# Patient Record
Sex: MALE | Race: WHITE | NOT HISPANIC OR LATINO | Employment: FULL TIME | URBAN - METROPOLITAN AREA
[De-identification: names, ages, dates, MRNs, and addresses within clinical notes are randomized per-mention and may not be internally consistent; named-entity substitution may affect disease eponyms.]

---

## 2017-06-22 ENCOUNTER — OFFICE VISIT (OUTPATIENT)
Dept: FAMILY MEDICINE | Facility: CLINIC | Age: 27
End: 2017-06-22
Payer: COMMERCIAL

## 2017-06-22 VITALS
DIASTOLIC BLOOD PRESSURE: 87 MMHG | WEIGHT: 282 LBS | RESPIRATION RATE: 16 BRPM | BODY MASS INDEX: 35.06 KG/M2 | HEART RATE: 88 BPM | SYSTOLIC BLOOD PRESSURE: 136 MMHG | TEMPERATURE: 98.9 F | HEIGHT: 75 IN

## 2017-06-22 DIAGNOSIS — H10.32 ACUTE CONJUNCTIVITIS OF LEFT EYE, UNSPECIFIED ACUTE CONJUNCTIVITIS TYPE: Primary | ICD-10-CM

## 2017-06-22 PROCEDURE — 99213 OFFICE O/P EST LOW 20 MIN: CPT | Performed by: PHYSICIAN ASSISTANT

## 2017-06-22 RX ORDER — CIPROFLOXACIN HYDROCHLORIDE 3.5 MG/ML
1 SOLUTION/ DROPS TOPICAL
Qty: 1 BOTTLE | Refills: 0 | Status: SHIPPED | OUTPATIENT
Start: 2017-06-22 | End: 2017-06-29

## 2017-06-22 NOTE — MR AVS SNAPSHOT
After Visit Summary   6/22/2017    Yakov Cantrell    MRN: [id number]           Patient Information     Date Of Birth          1990        Visit Information        Provider Department      6/22/2017 11:00 AM Jr Person PA-C [facility]        Today's Diagnoses     Acute conjunctivitis of left eye, unspecified acute conjunctivitis type    -  1      Care Instructions      Conjunctivitis, Nonspecific    The membrane that covers the white part of your eye (the conjunctiva) is inflamed. Inflammation happens when your body responds to an injury, allergic reaction, infection, or illness. Symptoms of inflammation in the eye may include redness, irritation, itching, swelling, or burning. These symptoms should go away within the next 24 hours. Conjunctivitis may be related to a particle that was in your eye. If so, it may wash out with your tears or irrigation treatment. Being exposed to liquid chemicals or fumes may also cause this reaction.   Home care    Apply a cold pack (ice in a plastic bag, wrapped in a towel) over the eye for 20 minutes at a time. This will reduce pain.    Artificial tears may be prescribed to reduce irritation or redness.  These should be used 3 to 4 times a day.    You may use acetaminophen or ibuprofen to control pain, unless another medicine was prescribed.(Note: If you have chronic liver or kidney disease, or if you have ever had a stomach ulcer or gastrointestinal bleeding, talk with your healthcare provider before using these medicines.)  Follow-up care  Follow up with your healthcare provider, or as advised.  When to seek medical advice  Call your healthcare provider right away if any of these occur:    Increased eyelid swelling    Increased eye pain    Increased redness or drainage from the eye    Increased blurry vision or increased sensitivity to light    Failure of normal vision to return within 24 to 48 hours  Date Last Reviewed: 6/14/2015     "[id number] [employer]. [address] [place] [address], [place] [address]. All rights reserved. This information is not intended as a substitute for professional medical care. Always follow your healthcare professional's instructions.                Follow-ups after your visit        Who to contact     If you have questions or need follow up information about today's clinic visit or your schedule please contact [facility] directly at [phone].  Normal or non-critical lab and imaging results will be communicated to you by Buzzwirehart, letter or phone within 4 business days after the clinic has received the results. If you do not hear from us within 7 days, please contact the clinic through Appniquet or phone. If you have a critical or abnormal lab result, we will notify you by phone as soon as possible.  Submit refill requests through Justworks or call your pharmacy and they will forward the refill request to us. Please allow 3 business days for your refill to be completed.          Additional Information About Your Visit        BuzzwireharCÃœR Information     Justworks lets you send messages to your doctor, view your test results, renew your prescriptions, schedule appointments and more. To sign up, go to [email or web address] . Click on \"Log in\" on the left side of the screen, which will take you to the Welcome page. Then click on \"Sign up Now\" on the right side of the page.     You will be asked to enter the access code listed below, as well as some personal information. Please follow the directions to create your username and password.     Your access code is: [email or web address]  Expires: 2017 11:10 AM     Your access code will  in 90 days. If you need help or a new code, please call your [facility] or [phone].        Care EveryWhere ID     This is your Care EveryWhere ID. This could be used by other organizations to access your [facility] medical records  [id number]        Your Vitals " "Were     Pulse Temperature Respirations Height BMI (Body Mass Index)       88 98.9  F (37.2  C) (Oral) 16 6' 3\" (1.905 m) 35.25 kg/m2        Blood Pressure from Last 3 Encounters:   06/22/17 145/87   12/14/16 120/72   07/29/16 (!) 145/91    Weight from Last 3 Encounters:   06/22/17 282 lb (127.9 kg)   12/14/16 264 lb 4.8 oz (119.9 kg)   07/29/16 280 lb (127 kg)              Today, you had the following     No orders found for display         Today's Medication Changes          These changes are accurate as of: 6/22/17 11:10 AM.  If you have any questions, ask your nurse or doctor.               Start taking these medicines.        Dose/Directions    ciprofloxacin 0.3 % ophthalmic solution   Commonly known as:  CILOXAN   Used for:  Acute conjunctivitis of left eye, unspecified acute conjunctivitis type   Started by:  Jr Person PA-C        Dose:  1 drop   Apply 1 drop to eye every 4 hours (while awake) for 7 days   Quantity:  1 Bottle   Refills:  0            Where to get your medicines      These medications were sent to [facility] [address]  [address] [facility] [address]     Phone:  [phone]     ciprofloxacin 0.3 % ophthalmic solution                Primary Care Provider    None Specified       No primary provider on file.        Equal Access to Services     FRANKI HELMS AH: Lauren guillaume Sonéstor, waaxda luqadaha, qaybta kaalmada hattie eller. So [facility] [phone].    ATENCIÓN: Si habla español, tiene a hayes disposición servicios gratuitos de asistencia lingüística. Marina al [phone].    We comply with applicable federal civil rights laws and [place] laws. We do not discriminate on the basis of race, color, national origin, age, disability sex, sexual orientation or gender identity.            Thank you!     Thank you for choosing [facility]  for your care. Our goal is always " to provide you with excellent care. Hearing back from our patients is one way we can continue to improve our services. Please take a few minutes to complete the written survey that you may receive in the mail after your visit with us. Thank you!             Your Updated Medication List - Protect others around you: Learn how to safely use, store and throw away your medicines at www.disposemymeds.org.          This list is accurate as of: 6/22/17 11:10 AM.  Always use your most recent med list.                   Brand Name Dispense Instructions for use Diagnosis    albuterol 108 (90 BASE) MCG/ACT Inhaler    PROAIR HFA/PROVENTIL HFA/VENTOLIN HFA    1 Inhaler    Inhale 2 puffs into the lungs every 6 hours as needed for shortness of breath / dyspnea or wheezing        ciprofloxacin 0.3 % ophthalmic solution    CILOXAN    1 Bottle    Apply 1 drop to eye every 4 hours (while awake) for 7 days    Acute conjunctivitis of left eye, unspecified acute conjunctivitis type

## 2017-06-22 NOTE — NURSING NOTE
"  Chief Complaint   Patient presents with     Eye Problem       Initial /87 (BP Location: Right arm, Patient Position: Chair, Cuff Size: Adult Large)  Pulse 88  Temp 98.9  F (37.2  C) (Oral)  Resp 16  Ht 6' 3\" (1.905 m)  Wt 282 lb (127.9 kg)  BMI 35.25 kg/m2 Estimated body mass index is 35.25 kg/(m^2) as calculated from the following:    Height as of this encounter: 6' 3\" (1.905 m).    Weight as of this encounter: 282 lb (127.9 kg).  Medication Reconciliation: complete          NAHID Mejia    "

## 2017-06-22 NOTE — LETTER
[facility]  [address] [place] [id number]  Phone: [phone]    June 22, 2017        Yakov Cantrell  [address] [place] [address] [place] [phone]          To whom it may concern:    RE: Yakov Cantrell    Patient was seen and treated today at our clinic and missed work due to illness.     Please contact me for questions or concerns.      Sincerely,        Jr Person PA-C

## 2017-06-22 NOTE — PROGRESS NOTES
SUBJECTIVE:                                                    Yakov Cantrell is a 26 year old male who presents to clinic today for the following health issues:      Acute Illness   Acute illness concerns: eye  Onset: x 1 day    Fever: no    Chills/Sweats: no    Headache (location?): no    Sinus Pressure:no    Conjunctivitis:  YES- left eye redness, itching, watery    Ear Pain: no    Rhinorrhea: no    Congestion: YES    Sore Throat: no     Cough: no    Wheeze: no    Decreased Appetite: no    Nausea: no    Vomiting: no    Diarrhea:  no    Dysuria/Freq.: no    Fatigue/Achiness: no    Sick/Strep Exposure: no     Therapies Tried and outcome: eye drops-with relief. polytrim from old infection.     Where's contacts. Put contact in eye from past infection. Has since thrown this away.       Problem list and histories reviewed & adjusted, as indicated.  Additional history: as documented    Patient Active Problem List   Diagnosis     CARDIOVASCULAR SCREENING; LDL GOAL LESS THAN 160     History reviewed. No pertinent surgical history.    Social History   Substance Use Topics     Smoking status: Current Some Day Smoker     Smokeless tobacco: Never Used     Alcohol use 0.0 oz/week     0 Standard drinks or equivalent per week     Family History   Problem Relation Age of Onset     Abdominal Aortic Aneurysm Maternal Grandfather          Current Outpatient Prescriptions   Medication Sig Dispense Refill     ciprofloxacin (CILOXAN) 0.3 % ophthalmic solution Apply 1 drop to eye every 4 hours (while awake) for 7 days 1 Bottle 0     albuterol (PROAIR HFA, PROVENTIL HFA, VENTOLIN HFA) 108 (90 BASE) MCG/ACT inhaler Inhale 2 puffs into the lungs every 6 hours as needed for shortness of breath / dyspnea or wheezing 1 Inhaler 1     No Known Allergies  BP Readings from Last 3 Encounters:   06/22/17 136/87   12/14/16 120/72   07/29/16 (!) 145/91    Wt Readings from Last 3 Encounters:   06/22/17 282 lb (127.9 kg)   12/14/16 264 lb 4.8 oz  "(119.9 kg)   07/29/16 280 lb (127 kg)                    Reviewed and updated as needed this visit by clinical staff  Tobacco  Allergies  Meds  Problems  Med Hx  Surg Hx  Fam Hx  Soc Hx        Reviewed and updated as needed this visit by Provider  Allergies  Meds  Problems         ROS:  Constitutional, HEENT, cardiovascular, pulmonary, gi and gu systems are negative, except as otherwise noted.    OBJECTIVE:                                                    /87 (BP Location: Right arm, Patient Position: Chair, Cuff Size: Adult Large)  Pulse 88  Temp 98.9  F (37.2  C) (Oral)  Resp 16  Ht 6' 3\" (1.905 m)  Wt 282 lb (127.9 kg)  BMI 35.25 kg/m2  Body mass index is 35.25 kg/(m^2).  GENERAL: healthy, alert, no distress and obese  EYES: PERRL, EOMI, visual fields normal and conjunctiva/corneas- conjunctival injection OS and yellow colored discharge present left  PSYCH: mentation appears normal, affect normal/bright    Diagnostic Test Results:  none      ASSESSMENT/PLAN:                                                      (H10.32) Acute conjunctivitis of left eye, unspecified acute conjunctivitis type  (primary encounter diagnosis)  Comment: likely bacterial based on history and exam. With contact use and re-infection, will tx with cipro. polytrim to be stopped. Also recommending contact precautions until symptoms resolve. If no improvement in 1 week, patient should RTC. Sooner if worsening.   Plan: ciprofloxacin (CILOXAN) 0.3 % ophthalmic         solution        -Medication use and side effects discussed with the patient. Patient is in complete understanding and agreement with plan.         Follow up: as above     Jr Person PA-C  [facility]"

## 2017-06-22 NOTE — PATIENT INSTRUCTIONS
Conjunctivitis, Nonspecific    The membrane that covers the white part of your eye (the conjunctiva) is inflamed. Inflammation happens when your body responds to an injury, allergic reaction, infection, or illness. Symptoms of inflammation in the eye may include redness, irritation, itching, swelling, or burning. These symptoms should go away within the next 24 hours. Conjunctivitis may be related to a particle that was in your eye. If so, it may wash out with your tears or irrigation treatment. Being exposed to liquid chemicals or fumes may also cause this reaction.   Home care    Apply a cold pack (ice in a plastic bag, wrapped in a towel) over the eye for 20 minutes at a time. This will reduce pain.    Artificial tears may be prescribed to reduce irritation or redness.  These should be used 3 to 4 times a day.    You may use acetaminophen or ibuprofen to control pain, unless another medicine was prescribed.(Note: If you have chronic liver or kidney disease, or if you have ever had a stomach ulcer or gastrointestinal bleeding, talk with your healthcare provider before using these medicines.)  Follow-up care  Follow up with your healthcare provider, or as advised.  When to seek medical advice  Call your healthcare provider right away if any of these occur:    Increased eyelid swelling    Increased eye pain    Increased redness or drainage from the eye    Increased blurry vision or increased sensitivity to light    Failure of normal vision to return within 24 to 48 hours  Date Last Reviewed: 6/14/2015 2000-2017 [employer]. [address] [place] [address]. All rights reserved. This information is not intended as a substitute for professional medical care. Always follow your healthcare professional's instructions.

## 2017-07-17 ENCOUNTER — RADIANT APPOINTMENT (OUTPATIENT)
Dept: GENERAL RADIOLOGY | Facility: CLINIC | Age: 27
End: 2017-07-17
Attending: FAMILY MEDICINE
Payer: COMMERCIAL

## 2017-07-17 ENCOUNTER — OFFICE VISIT (OUTPATIENT)
Dept: FAMILY MEDICINE | Facility: CLINIC | Age: 27
End: 2017-07-17
Payer: COMMERCIAL

## 2017-07-17 VITALS
HEART RATE: 72 BPM | DIASTOLIC BLOOD PRESSURE: 70 MMHG | TEMPERATURE: 98.6 F | SYSTOLIC BLOOD PRESSURE: 110 MMHG | OXYGEN SATURATION: 96 %

## 2017-07-17 DIAGNOSIS — R10.11 ABDOMINAL PAIN, RIGHT UPPER QUADRANT: Primary | ICD-10-CM

## 2017-07-17 DIAGNOSIS — R10.11 ABDOMINAL PAIN, RIGHT UPPER QUADRANT: ICD-10-CM

## 2017-07-17 LAB
ALBUMIN UR-MCNC: 30 MG/DL
APPEARANCE UR: CLEAR
BASOPHILS # BLD AUTO: 0.1 10E9/L (ref 0–0.2)
BASOPHILS NFR BLD AUTO: 0.6 %
BILIRUB UR QL STRIP: NEGATIVE
COLOR UR AUTO: YELLOW
DIFFERENTIAL METHOD BLD: NORMAL
EOSINOPHIL # BLD AUTO: 0.2 10E9/L (ref 0–0.7)
EOSINOPHIL NFR BLD AUTO: 2.7 %
ERYTHROCYTE [DISTWIDTH] IN BLOOD BY AUTOMATED COUNT: 12.8 % (ref 10–15)
GLUCOSE UR STRIP-MCNC: NEGATIVE MG/DL
HCT VFR BLD AUTO: 45.6 % (ref 40–53)
HGB BLD-MCNC: 15.2 G/DL (ref 13.3–17.7)
HGB UR QL STRIP: ABNORMAL
KETONES UR STRIP-MCNC: NEGATIVE MG/DL
LEUKOCYTE ESTERASE UR QL STRIP: NEGATIVE
LYMPHOCYTES # BLD AUTO: 1.8 10E9/L (ref 0.8–5.3)
LYMPHOCYTES NFR BLD AUTO: 21.3 %
MCH RBC QN AUTO: 27.7 PG (ref 26.5–33)
MCHC RBC AUTO-ENTMCNC: 33.3 G/DL (ref 31.5–36.5)
MCV RBC AUTO: 83 FL (ref 78–100)
MONOCYTES # BLD AUTO: 1.3 10E9/L (ref 0–1.3)
MONOCYTES NFR BLD AUTO: 15.5 %
MUCOUS THREADS #/AREA URNS LPF: PRESENT /LPF
NEUTROPHILS # BLD AUTO: 5.1 10E9/L (ref 1.6–8.3)
NEUTROPHILS NFR BLD AUTO: 59.9 %
NITRATE UR QL: NEGATIVE
PH UR STRIP: 6 PH (ref 5–7)
PLATELET # BLD AUTO: 291 10E9/L (ref 150–450)
RBC # BLD AUTO: 5.48 10E12/L (ref 4.4–5.9)
RBC #/AREA URNS AUTO: ABNORMAL /HPF (ref 0–2)
SP GR UR STRIP: >1.03 (ref 1–1.03)
URN SPEC COLLECT METH UR: ABNORMAL
UROBILINOGEN UR STRIP-ACNC: 0.2 EU/DL (ref 0.2–1)
WBC # BLD AUTO: 8.5 10E9/L (ref 4–11)
WBC #/AREA URNS AUTO: ABNORMAL /HPF (ref 0–2)

## 2017-07-17 PROCEDURE — 80076 HEPATIC FUNCTION PANEL: CPT | Performed by: FAMILY MEDICINE

## 2017-07-17 PROCEDURE — 36415 COLL VENOUS BLD VENIPUNCTURE: CPT | Performed by: FAMILY MEDICINE

## 2017-07-17 PROCEDURE — 85025 COMPLETE CBC W/AUTO DIFF WBC: CPT | Performed by: FAMILY MEDICINE

## 2017-07-17 PROCEDURE — 74020 XR ABDOMEN 2 VW: CPT

## 2017-07-17 PROCEDURE — 99214 OFFICE O/P EST MOD 30 MIN: CPT | Performed by: FAMILY MEDICINE

## 2017-07-17 PROCEDURE — 81001 URINALYSIS AUTO W/SCOPE: CPT | Performed by: FAMILY MEDICINE

## 2017-07-17 RX ORDER — KETOROLAC TROMETHAMINE 10 MG/1
10 TABLET, FILM COATED ORAL EVERY 6 HOURS PRN
Qty: 20 TABLET | Refills: 0 | Status: SHIPPED | OUTPATIENT
Start: 2017-07-17 | End: 2018-03-20

## 2017-07-17 NOTE — PROGRESS NOTES
SUBJECTIVE:                                                    Yakov Cantrell is a 27 year old male who presents to clinic today for the following health issues:      Abdominal pain- R upper quadrant, pain radiates through navel,  Pain on bilateral lower sides of scapula. Pain started Saturday and it has been constant. Has not been taking any meds but has doing resting.    Red, dry and scaly spot on fingers palm side. Denied itchiness.         Problem list and histories reviewed & adjusted, as indicated.  Additional history:     Patient Active Problem List   Diagnosis     CARDIOVASCULAR SCREENING; LDL GOAL LESS THAN 160     Herpes zoster without complication     No past surgical history on file.    Social History   Substance Use Topics     Smoking status: Current Some Day Smoker     Smokeless tobacco: Never Used     Alcohol use 0.0 oz/week     0 Standard drinks or equivalent per week      Comment: rarely     Family History   Problem Relation Age of Onset     Abdominal Aortic Aneurysm Maternal Grandfather              Reviewed and updated as needed this visit by clinical staff       Reviewed and updated as needed this visit by Provider         ROS:  Constitutional, HEENT, cardiovascular, pulmonary, gi and gu systems are negative, except as otherwise noted.      OBJECTIVE:   /70 (BP Location: Right arm, Patient Position: Chair, Cuff Size: Adult Regular)  Pulse 72  Temp 98.6  F (37  C) (Oral)  SpO2 96%  There is no height or weight on file to calculate BMI.  GENERAL: healthy, alert and no distress  GENERAL: alert and mild distress  HENT: ear canals and TM's normal, nose and mouth without ulcers or lesions  NECK: no adenopathy, no asymmetry, masses, or scars and thyroid normal to palpation  RESP: lungs clear to auscultation - no rales, rhonchi or wheezes  CV: regular rate and rhythm, normal S1 S2, no S3 or S4, no murmur, click or rub, no peripheral edema and peripheral pulses strong  ABDOMEN: bowel sounds  normal and soft without tenderness.  MS: no gross musculoskeletal defects noted, no edema    Diagnostic Test Results:  Results for orders placed or performed in visit on 07/17/17   CBC with platelets and differential   Result Value Ref Range    WBC 8.5 4.0 - 11.0 10e9/L    RBC Count 5.48 4.4 - 5.9 10e12/L    Hemoglobin 15.2 13.3 - 17.7 g/dL    Hematocrit 45.6 40.0 - 53.0 %    MCV 83 78 - 100 fl    MCH 27.7 26.5 - 33.0 pg    MCHC 33.3 31.5 - 36.5 g/dL    RDW 12.8 10.0 - 15.0 %    Platelet Count 291 150 - 450 10e9/L    Diff Method Automated Method     % Neutrophils 59.9 %    % Lymphocytes 21.3 %    % Monocytes 15.5 %    % Eosinophils 2.7 %    % Basophils 0.6 %    Absolute Neutrophil 5.1 1.6 - 8.3 10e9/L    Absolute Lymphocytes 1.8 0.8 - 5.3 10e9/L    Absolute Monocytes 1.3 0.0 - 1.3 10e9/L    Absolute Eosinophils 0.2 0.0 - 0.7 10e9/L    Absolute Basophils 0.1 0.0 - 0.2 10e9/L   *UA reflex to Microscopic and Culture ([place] and [facility] (except [place] and [place])   Result Value Ref Range    Color Urine Yellow     Appearance Urine Clear     Glucose Urine Negative NEG mg/dL    Bilirubin Urine Negative NEG    Ketones Urine Negative NEG mg/dL    Specific Gravity Urine >1.030 1.003 - 1.035    Blood Urine Trace (A) NEG    pH Urine 6.0 5.0 - 7.0 pH    Protein Albumin Urine 30 (A) NEG mg/dL    Urobilinogen Urine 0.2 0.2 - 1.0 EU/dL    Nitrite Urine Negative NEG    Leukocyte Esterase Urine Negative NEG    Source Midstream Urine    Hepatic panel   Result Value Ref Range    Bilirubin Direct <0.1 0.0 - 0.2 mg/dL    Bilirubin Total 0.4 0.2 - 1.3 mg/dL    Albumin 4.2 3.4 - 5.0 g/dL    Protein Total 8.0 6.8 - 8.8 g/dL    Alkaline Phosphatase 48 40 - 150 U/L    ALT 29 0 - 70 U/L    AST 16 0 - 45 U/L   Urine Microscopic   Result Value Ref Range    WBC Urine O - 2 0 - 2 /HPF    RBC Urine O - 2 0 - 2 /HPF    Mucous Urine Present (A) NEG /LPF         ASSESSMENT/PLAN:               ICD-10-CM    1. Abdominal pain, right upper  quadrant R10.11 CBC with platelets and differential     *UA reflex to Microscopic and Culture ([place] and [facility] (except [place] and [place])     XR Abdomen 2 Views     Hepatic panel     Urine Microscopic     CT Abdomen Pelvis w Contrast     ketorolac (TORADOL) 10 MG tablet       27-year-old male with abdominal pain. This abdominal pain he thinks is probably more gallbladder nature. He is afebrile he does not have an elevated white count and he does not have a positive Rivers sign. His exam showed some slight tenderness lower quadrants but certainly not specific to the left lower quadrant. His differential diagnosis certainly would include diverticulitis.    He'll be set up for a CT scan to rule out gallbladder diverticulitis acute appendicitis, torsion.    Luciano Riggs MD  [facility]

## 2017-07-17 NOTE — MR AVS SNAPSHOT
"              After Visit Summary   2017    Yakov Cantrell    MRN: [id number]           Patient Information     Date Of Birth          1990        Visit Information        Provider Department      2017 3:00 PM Luciano Riggs MD [facility]        Today's Diagnoses     Abdominal pain, right upper quadrant    -  1       Follow-ups after your visit        Who to contact     If you have questions or need follow up information about today's clinic visit or your schedule please contact [facility] directly at [phone].  Normal or non-critical lab and imaging results will be communicated to you by Beyond Commercehart, letter or phone within 4 business days after the clinic has received the results. If you do not hear from us within 7 days, please contact the clinic through Beyond Commercehart or phone. If you have a critical or abnormal lab result, we will notify you by phone as soon as possible.  Submit refill requests through "[employer]" or call your pharmacy and they will forward the refill request to us. Please allow 3 business days for your refill to be completed.          Additional Information About Your Visit        MyChart Information     "[employer]" lets you send messages to your doctor, view your test results, renew your prescriptions, schedule appointments and more. To sign up, go to [email or web address]" . Click on \"Log in\" on the left side of the screen, which will take you to the Welcome page. Then click on \"Sign up Now\" on the right side of the page.     You will be asked to enter the access code listed below, as well as some personal information. Please follow the directions to create your username and password.     Your access code is: [email or web address]  Expires: 2017 11:10 AM     Your access code will  in 90 days. If you need help or a new code, please call your [facility] or [phone].        Care EveryWhere ID     This is your Care EveryWhere ID. This could be used by " other organizations to access your [facility] medical records  [id number]        Your Vitals Were     Pulse Temperature Pulse Oximetry             72 98.6  F (37  C) (Oral) 96%          Blood Pressure from Last 3 Encounters:   07/19/17 131/83   07/17/17 110/70   06/22/17 136/87    Weight from Last 3 Encounters:   07/19/17 280 lb (127 kg)   06/22/17 282 lb (127.9 kg)   12/14/16 264 lb 4.8 oz (119.9 kg)              We Performed the Following     *UA reflex to Microscopic and Culture ([place] and [facility] (except [place] and [place])     CBC with platelets and differential     Hepatic panel     Urine Microscopic          Today's Medication Changes          These changes are accurate as of: 7/17/17 11:59 PM.  If you have any questions, ask your nurse or doctor.               Start taking these medicines.        Dose/Directions    ketorolac 10 MG tablet   Commonly known as:  TORADOL   Used for:  Abdominal pain, right upper quadrant   Started by:  Luciano Riggs MD        Dose:  10 mg   Take 1 tablet (10 mg) by mouth every 6 hours as needed for moderate pain   Quantity:  20 tablet   Refills:  0            Where to get your medicines      These medications were sent to [facility] [address] [place] [address] 42  AT [address] [address]     Phone:  [phone]     ketorolac 10 MG tablet                Primary Care Provider    None Specified       No address on file        Equal Access to Services     FRANKI HELMS AH: Hadii navi prabhakaro Sonéstor, waaxda luqadaha, qaybta kaalmada hattie eller. So [facility] [phone].    ATENCIÓN: Si habla español, tiene a hayes disposición servicios gratuitos de asistencia lingüística. Llame al [phone].    We comply with applicable federal civil rights laws and [place] laws. We do not discriminate on the basis of race, color, national origin, age, disability sex,  sexual orientation or gender identity.            Thank you!     Thank you for choosing [facility]  for your care. Our goal is always to provide you with excellent care. Hearing back from our patients is one way we can continue to improve our services. Please take a few minutes to complete the written survey that you may receive in the mail after your visit with us. Thank you!             Your Updated Medication List - Protect others around you: Learn how to safely use, store and throw away your medicines at www.disposemymeds.org.          This list is accurate as of: 7/17/17 11:59 PM.  Always use your most recent med list.                   Brand Name Dispense Instructions for use Diagnosis    albuterol 108 (90 BASE) MCG/ACT Inhaler    PROAIR HFA/PROVENTIL HFA/VENTOLIN HFA    1 Inhaler    Inhale 2 puffs into the lungs every 6 hours as needed for shortness of breath / dyspnea or wheezing        ketorolac 10 MG tablet    TORADOL    20 tablet    Take 1 tablet (10 mg) by mouth every 6 hours as needed for moderate pain    Abdominal pain, right upper quadrant

## 2017-07-17 NOTE — NURSING NOTE
"Chief Complaint   Patient presents with     Abdominal Pain     Derm Problem       Initial /70 (BP Location: Right arm, Patient Position: Chair, Cuff Size: Adult Regular)  Pulse 72  Temp 98.6  F (37  C) (Oral)  SpO2 96% Estimated body mass index is 35.25 kg/(m^2) as calculated from the following:    Height as of 6/22/17: 6' 3\" (1.905 m).    Weight as of 6/22/17: 282 lb (127.9 kg).  Medication Reconciliation: complete     Erica Hernandez CMA (AAMA)        "

## 2017-07-18 ENCOUNTER — TELEPHONE (OUTPATIENT)
Dept: FAMILY MEDICINE | Facility: CLINIC | Age: 27
End: 2017-07-18

## 2017-07-18 ENCOUNTER — HOSPITAL ENCOUNTER (OUTPATIENT)
Dept: CT IMAGING | Facility: CLINIC | Age: 27
Discharge: HOME | End: 2017-07-18
Attending: FAMILY MEDICINE | Admitting: FAMILY MEDICINE
Payer: COMMERCIAL

## 2017-07-18 DIAGNOSIS — R10.11 ABDOMINAL PAIN, RIGHT UPPER QUADRANT: ICD-10-CM

## 2017-07-18 LAB
ALBUMIN SERPL-MCNC: 4.2 G/DL (ref 3.4–5)
ALP SERPL-CCNC: 48 U/L (ref 40–150)
ALT SERPL W P-5'-P-CCNC: 29 U/L (ref 0–70)
AST SERPL W P-5'-P-CCNC: 16 U/L (ref 0–45)
BILIRUB DIRECT SERPL-MCNC: <0.1 MG/DL (ref 0–0.2)
BILIRUB SERPL-MCNC: 0.4 MG/DL (ref 0.2–1.3)
PROT SERPL-MCNC: 8 G/DL (ref 6.8–8.8)

## 2017-07-18 PROCEDURE — 74177 CT ABD & PELVIS W/CONTRAST: CPT

## 2017-07-18 PROCEDURE — 25000128 H RX IP 250 OP 636: Performed by: RADIOLOGY

## 2017-07-18 RX ORDER — IOPAMIDOL 755 MG/ML
500 INJECTION, SOLUTION INTRAVASCULAR ONCE
Status: COMPLETED | OUTPATIENT
Start: 2017-07-18 | End: 2017-07-18

## 2017-07-18 RX ADMIN — IOPAMIDOL 100 ML: 755 INJECTION, SOLUTION INTRAVENOUS at 16:53

## 2017-07-18 RX ADMIN — SODIUM CHLORIDE 65 ML: 9 INJECTION, SOLUTION INTRAVENOUS at 16:53

## 2017-07-18 NOTE — TELEPHONE ENCOUNTER
Pt called back stating he just noticed a rash on abdomen that is red, raised, and bumpy.  Not itchy.  Advised to watch this as unsure if this is related to his abdominal pain.  Can try benadryl/hyrdocortisone cream on it with ice packs.  If it gets worse then he should follow up in clinic.    Pt expressed understanding and acceptance of the plan.  Pt had no further questions at this time.  Advised can call back to clinic at any time with concerns.       Fili Rausch RN, BSN

## 2017-07-18 NOTE — TELEPHONE ENCOUNTER
"Pt calling stating he scheduled an appt for CT today at 4 but is having pain in the left quadrant by belly button that is shooting.  He rates it 5-6/10 when it happens and it is not consistent.  Denies any other new symptoms or fever.  States his family is \"freaking out and wanted me to call.\"  Advised patient that he could wait until CT scan but only he can tell how much pain he is in and what his threshold it.  If he has any vomiting, bowel changes, or fever/chills or worsening pain that is more constant or intense then he should go to the ER with someone to drive.    Pt expressed understanding and acceptance of the plan.  Pt had no further questions at this time.  Advised can call back to clinic at any time with concerns.       Fili Rausch RN, BSN    "

## 2017-07-19 ENCOUNTER — TELEPHONE (OUTPATIENT)
Dept: URGENT CARE | Facility: URGENT CARE | Age: 27
End: 2017-07-19

## 2017-07-19 ENCOUNTER — OFFICE VISIT (OUTPATIENT)
Dept: FAMILY MEDICINE | Facility: CLINIC | Age: 27
End: 2017-07-19
Payer: COMMERCIAL

## 2017-07-19 VITALS
BODY MASS INDEX: 34.82 KG/M2 | DIASTOLIC BLOOD PRESSURE: 83 MMHG | SYSTOLIC BLOOD PRESSURE: 131 MMHG | RESPIRATION RATE: 16 BRPM | OXYGEN SATURATION: 98 % | HEART RATE: 70 BPM | WEIGHT: 280 LBS | TEMPERATURE: 99.4 F | HEIGHT: 75 IN

## 2017-07-19 DIAGNOSIS — B02.9 HERPES ZOSTER WITHOUT COMPLICATION: Primary | ICD-10-CM

## 2017-07-19 PROCEDURE — 99213 OFFICE O/P EST LOW 20 MIN: CPT | Performed by: PHYSICIAN ASSISTANT

## 2017-07-19 RX ORDER — VALACYCLOVIR HYDROCHLORIDE 1 G/1
1000 TABLET, FILM COATED ORAL 3 TIMES DAILY
Qty: 21 TABLET | Refills: 0 | Status: SHIPPED | OUTPATIENT
Start: 2017-07-19 | End: 2017-08-08

## 2017-07-19 NOTE — PATIENT INSTRUCTIONS
Shingles  Shingles is a viral infection caused by the same virus as chicken pox. Anyone who has had chicken pox may get shingles later in life. The virus stays in the body, but remains dormant (asleep). Shingles often occurs in older persons or persons with lowered immunity. But it can affect anyone at any age.  Shingles starts as a tingling patch of skin on one side of the body. Small, painful blisters may then appear. The rash does not spread to the rest of the body.  Exposure to shingles cannot cause shingles. However, it can cause chicken pox in anyone who has not had chicken pox or has not been vaccinated. The contagious period ends when all blisters have crusted over (generally about 2 weeks after the illness begins).  After the blisters heal, the affected skin may be sensitive or painful for months (neuralgia). This often gradually goes away.  A shingles vaccine is available. This can help prevent shingles or make it less painful. It is generally recommended for adults over the age of 60 who have had chicken pox in the past, but who have never had shingles. Adults over 60 who have had neither chicken pox nor shingles can prevent both diseases with the chicken pox vaccine. Ask your healthcare provider about these vaccines.  Home care    Medicines may be prescribed to help relieve pain. Take these medicines as directed. Ask your healthcare provider or pharmacist before using over-the-counter medicines for helping treat pain and itching.    In certain cases, antiviral medicines may be prescribed to reduce pain, shorten the illness, and prevent neuralgia. Take these medicines as directed.    Compresses made from a solution of cool water mixed with cornstarch or baking soda may help relieve pain and itching.     Gently wash skin daily with soap and water to help prevent infection.  Be certain to rinse off all of the soap, which can be irritating.    Trim fingernails and try not to scratch. Scratching the sores  may leave scars.    Stay home from work or school until all blisters have formed a crust and you are no longer contagious.  Follow-up care  Follow up with your healthcare provider or as directed by our staff.  When to seek medical advice    Fever of 100.4 F (38 C) or higher, or as directed by your healthcare provider    Affected skin is on the face or neck and any of the following occur:    Headache    Eye pain    Changes in vision    Sores near the eye    Weakness of facial muscles    Pain, redness, or swelling of a joint    Signs of skin infection: colored drainage from the sores, warmth, increasing redness, or increasing pain  Date Last Reviewed: 9/25/2015 2000-2017 [employer] [address] [place] [address], [place] [address]. All rights reserved. This information is not intended as a substitute for professional medical care. Always follow your healthcare professional's instructions.

## 2017-07-19 NOTE — TELEPHONE ENCOUNTER
"Pt calling with \"blistering rash on right side and wrapping around to back and front\"     Pt states the rash \"really does not hurt except were a zit was\"  But he has been seen for RUQ pain.      Pt denies fever and just noticed rash last night.     Keira Cam RN    "

## 2017-07-19 NOTE — NURSING NOTE
"Chief Complaint   Patient presents with     Derm Problem     rash on right side back and abdomen       Initial /83 (BP Location: Right arm, Patient Position: Chair, Cuff Size: Adult Large)  Pulse 70  Temp 99.4  F (37.4  C) (Oral)  Resp 16  Ht 6' 2.5\" (1.892 m)  Wt 280 lb (127 kg)  SpO2 98%  BMI 35.47 kg/m2 Estimated body mass index is 35.47 kg/(m^2) as calculated from the following:    Height as of this encounter: 6' 2.5\" (1.892 m).    Weight as of this encounter: 280 lb (127 kg).  Medication Reconciliation: complete   Regina George, VIRGIE      "

## 2017-07-19 NOTE — PROGRESS NOTES
SUBJECTIVE:                                                    Yakov Cantrell is a 27 year old male who presents to clinic today for the following health issues:      Rash  Onset: 2 days    Description:   Location: right side back and abdomen  Character: blotchy, raised, red  Itching (Pruritis): no     Progression of Symptoms:  worsening    Accompanying Signs & Symptoms:  Fever: YES  Body aches or joint pain: YES-ruq abdominal pain. Was seen at  two days ago. CT scan was normal. Rash appeared the next day.   Sore throat symptoms: no   Recent cold symptoms: no     History:   Previous similar rash: no     Precipitating factors:   Exposure to similar rash: no   New exposures: None   Recent travel: no     Alleviating factors:  none    Therapies Tried and outcome: none     Of note, patient admits to being under lots of stress lately.     Problem list and histories reviewed & adjusted, as indicated.  Additional history: as documented    Patient Active Problem List   Diagnosis     CARDIOVASCULAR SCREENING; LDL GOAL LESS THAN 160     Herpes zoster without complication     History reviewed. No pertinent surgical history.    Social History   Substance Use Topics     Smoking status: Current Some Day Smoker     Smokeless tobacco: Never Used     Alcohol use 0.0 oz/week     0 Standard drinks or equivalent per week      Comment: rarely     Family History   Problem Relation Age of Onset     Abdominal Aortic Aneurysm Maternal Grandfather          Current Outpatient Prescriptions   Medication Sig Dispense Refill     valACYclovir (VALTREX) 1000 mg tablet Take 1 tablet (1,000 mg) by mouth 3 times daily 21 tablet 0     ketorolac (TORADOL) 10 MG tablet Take 1 tablet (10 mg) by mouth every 6 hours as needed for moderate pain 20 tablet 0     albuterol (PROAIR HFA, PROVENTIL HFA, VENTOLIN HFA) 108 (90 BASE) MCG/ACT inhaler Inhale 2 puffs into the lungs every 6 hours as needed for shortness of breath / dyspnea or wheezing 1 Inhaler 1  "    No Known Allergies  BP Readings from Last 3 Encounters:   07/19/17 131/83   07/17/17 110/70   06/22/17 136/87    Wt Readings from Last 3 Encounters:   07/19/17 280 lb (127 kg)   06/22/17 282 lb (127.9 kg)   12/14/16 264 lb 4.8 oz (119.9 kg)                        Reviewed and updated as needed this visit by clinical staffTobacco  Allergies  Meds  Problems  Med Hx  Surg Hx  Fam Hx  Soc Hx        Reviewed and updated as needed this visit by Provider  Allergies  Meds  Problems         ROS:  Constitutional, HEENT, cardiovascular, pulmonary, gi and gu systems are negative, except as otherwise noted.      OBJECTIVE:   /83 (BP Location: Right arm, Patient Position: Chair, Cuff Size: Adult Large)  Pulse 70  Temp 99.4  F (37.4  C) (Oral)  Resp 16  Ht 6' 2.5\" (1.892 m)  Wt 280 lb (127 kg)  SpO2 98%  BMI 35.47 kg/m2  Body mass index is 35.47 kg/(m^2).  GENERAL: healthy, alert and no distress  SKIN: erythematous macular-pustular rash noted along right T9 dermatome.    PSYCH: mentation appears normal, affect normal/bright    Diagnostic Test Results:  none     ASSESSMENT/PLAN:     (B02.9) Herpes zoster without complication  (primary encounter diagnosis)  Comment: evident on history and exam. No evidence of secondary infection. Likely cause of original abdominal pain as well. Mild pain present. Will tx with valtrex and otc analgesics. If symptoms fail to improve in 2 weeks, patient should RTC. Sooner if worsening.   Plan: valACYclovir (VALTREX) 1000 mg tablet        -Medication use and side effects discussed with the patient. Patient is in complete understanding and agreement with plan.         Follow up: as above     Jr Person PA-C  [facility]"

## 2017-07-19 NOTE — MR AVS SNAPSHOT
After Visit Summary   7/19/2017    Yakov Cantrell    MRN: [id number]           Patient Information     Date Of Birth          1990        Visit Information        Provider Department      7/19/2017 11:45 AM Jr Person PA-C [facility]        Today's Diagnoses     Herpes zoster without complication    -  1      Care Instructions      Shingles  Shingles is a viral infection caused by the same virus as chicken pox. Anyone who has had chicken pox may get shingles later in life. The virus stays in the body, but remains dormant (asleep). Shingles often occurs in older persons or persons with lowered immunity. But it can affect anyone at any age.  Shingles starts as a tingling patch of skin on one side of the body. Small, painful blisters may then appear. The rash does not spread to the rest of the body.  Exposure to shingles cannot cause shingles. However, it can cause chicken pox in anyone who has not had chicken pox or has not been vaccinated. The contagious period ends when all blisters have crusted over (generally about 2 weeks after the illness begins).  After the blisters heal, the affected skin may be sensitive or painful for months (neuralgia). This often gradually goes away.  A shingles vaccine is available. This can help prevent shingles or make it less painful. It is generally recommended for adults over the age of 60 who have had chicken pox in the past, but who have never had shingles. Adults over 60 who have had neither chicken pox nor shingles can prevent both diseases with the chicken pox vaccine. Ask your healthcare provider about these vaccines.  Home care    Medicines may be prescribed to help relieve pain. Take these medicines as directed. Ask your healthcare provider or pharmacist before using over-the-counter medicines for helping treat pain and itching.    In certain cases, antiviral medicines may be prescribed to reduce pain, shorten the illness, and  prevent neuralgia. Take these medicines as directed.    Compresses made from a solution of cool water mixed with cornstarch or baking soda may help relieve pain and itching.     Gently wash skin daily with soap and water to help prevent infection.  Be certain to rinse off all of the soap, which can be irritating.    Trim fingernails and try not to scratch. Scratching the sores may leave scars.    Stay home from work or school until all blisters have formed a crust and you are no longer contagious.  Follow-up care  Follow up with your healthcare provider or as directed by our staff.  When to seek medical advice    Fever of 100.4 F (38 C) or higher, or as directed by your healthcare provider    Affected skin is on the face or neck and any of the following occur:    Headache    Eye pain    Changes in vision    Sores near the eye    Weakness of facial muscles    Pain, redness, or swelling of a joint    Signs of skin infection: colored drainage from the sores, warmth, increasing redness, or increasing pain  Date Last Reviewed: 9/25/2015 2000-2017 [employer]. [address] [place] [address]. All rights reserved. This information is not intended as a substitute for professional medical care. Always follow your healthcare professional's instructions.                Follow-ups after your visit        Who to contact     If you have questions or need follow up information about today's clinic visit or your schedule please contact [facility] directly at [phone].  Normal or non-critical lab and imaging results will be communicated to you by MyChart, letter or phone within 4 business days after the clinic has received the results. If you do not hear from us within 7 days, please contact the clinic through MyChart or phone. If you have a critical or abnormal lab result, we will notify you by phone as soon as possible.  Submit refill requests through [facility] or call your pharmacy  "and they will forward the refill request to us. Please allow 3 business days for your refill to be completed.          Additional Information About Your Visit        MyChart Information     H5 lets you send messages to your doctor, view your test results, renew your prescriptions, schedule appointments and more. To sign up, go to [email or web address] . Click on \"Log in\" on the left side of the screen, which will take you to the Welcome page. Then click on \"Sign up Now\" on the right side of the page.     You will be asked to enter the access code listed below, as well as some personal information. Please follow the directions to create your username and password.     Your access code is: [email or web address]  Expires: 2017 11:10 AM     Your access code will  in 90 days. If you need help or a new code, please call your [place] clinic or [phone].        Care EveryWhere ID     This is your [facility] EveryWhere ID. This could be used by other organizations to access your [place] medical records  [id number]        Your Vitals Were     Pulse Temperature Respirations Height Pulse Oximetry BMI (Body Mass Index)    70 99.4  F (37.4  C) (Oral) 16 6' 2.5\" (1.892 m) 98% 35.47 kg/m2       Blood Pressure from Last 3 Encounters:   17 131/83   17 110/70   17 136/87    Weight from Last 3 Encounters:   17 280 lb (127 kg)   17 282 lb (127.9 kg)   16 264 lb 4.8 oz (119.9 kg)              Today, you had the following     No orders found for display         Today's Medication Changes          These changes are accurate as of: 17 12:03 PM.  If you have any questions, ask your nurse or doctor.               Start taking these medicines.        Dose/Directions    valACYclovir 1000 mg tablet   Commonly known as:  VALTREX   Used for:  Herpes zoster without complication   Started by:  Jr Person PA-C        Dose:  1000 mg   Take 1 tablet (1,000 mg) by mouth 3 times daily "   Quantity:  21 tablet   Refills:  0            Where to get your medicines      These medications were sent to [facility] - [place], [place] - [address] [place] [address]  [address], [facility] [address]     Phone:  [phone]     valACYclovir 1000 mg tablet                Primary Care Provider    Physician No Ref-Primary       No address on file        Equal Access to Services     FRANKI HELMS : Hadii aad ku hadasho Soomaali, waaxda luqadaha, qaybta kaalmada adeegyada, waxay idiin hayaan adeeg khclevesh lacarlos emery. So [facility] [phone].    ATENCIÓN: Si habla español, tiene a hayes disposición servicios gratuitos de asistencia lingüística. Marina al [phone].    We comply with applicable federal civil rights laws and [place] laws. We do not discriminate on the basis of race, color, national origin, age, disability sex, sexual orientation or gender identity.            Thank you!     Thank you for choosing [facility]  for your care. Our goal is always to provide you with excellent care. Hearing back from our patients is one way we can continue to improve our services. Please take a few minutes to complete the written survey that you may receive in the mail after your visit with us. Thank you!             Your Updated Medication List - Protect others around you: Learn how to safely use, store and throw away your medicines at www.disposemymeds.org.          This list is accurate as of: 7/19/17 12:03 PM.  Always use your most recent med list.                   Brand Name Dispense Instructions for use Diagnosis    albuterol 108 (90 BASE) MCG/ACT Inhaler    PROAIR HFA/PROVENTIL HFA/VENTOLIN HFA    1 Inhaler    Inhale 2 puffs into the lungs every 6 hours as needed for shortness of breath / dyspnea or wheezing        ketorolac 10 MG tablet    TORADOL    20 tablet    Take 1 tablet (10 mg) by mouth every 6 hours as needed for moderate pain    Abdominal  pain, right upper quadrant       valACYclovir 1000 mg tablet    VALTREX    21 tablet    Take 1 tablet (1,000 mg) by mouth 3 times daily    Herpes zoster without complication

## 2017-07-24 ENCOUNTER — TELEPHONE (OUTPATIENT)
Dept: FAMILY MEDICINE | Facility: CLINIC | Age: 27
End: 2017-07-24

## 2017-07-24 NOTE — TELEPHONE ENCOUNTER
Yakov requests work excuse July 17 - 24. Returning to work July 25.   Initially out for abdominal pain then later later for shingles pain.   Please advise.   Call back [phone] for  .  Kolby Gomez RN

## 2017-07-24 NOTE — LETTER
Yakov LIPSCOMB Óscar  [address] [place] [address] [place] [id number]        July 24, 2017           To Whom It May Concern:    Yakov Cantrell was seen in our clinic. Excuse from work July 18-24 due to illness. He may return to work July 25, 2017 without restrictions.    Sincerely,        Jr Person PA-C

## 2017-08-08 ENCOUNTER — TELEPHONE (OUTPATIENT)
Dept: FAMILY MEDICINE | Facility: CLINIC | Age: 27
End: 2017-08-08

## 2017-08-08 ENCOUNTER — OFFICE VISIT (OUTPATIENT)
Dept: FAMILY MEDICINE | Facility: CLINIC | Age: 27
End: 2017-08-08
Payer: COMMERCIAL

## 2017-08-08 VITALS
OXYGEN SATURATION: 99 % | HEART RATE: 80 BPM | WEIGHT: 276.4 LBS | TEMPERATURE: 99 F | SYSTOLIC BLOOD PRESSURE: 130 MMHG | DIASTOLIC BLOOD PRESSURE: 78 MMHG | BODY MASS INDEX: 35.01 KG/M2 | RESPIRATION RATE: 20 BRPM

## 2017-08-08 DIAGNOSIS — L30.9 DERMATITIS: ICD-10-CM

## 2017-08-08 DIAGNOSIS — B02.9 HERPES ZOSTER WITHOUT COMPLICATION: Primary | ICD-10-CM

## 2017-08-08 PROCEDURE — 99213 OFFICE O/P EST LOW 20 MIN: CPT | Performed by: PHYSICIAN ASSISTANT

## 2017-08-08 RX ORDER — LIDOCAINE 50 MG/G
PATCH TOPICAL
Qty: 30 PATCH | Refills: 0 | Status: SHIPPED | OUTPATIENT
Start: 2017-08-08 | End: 2018-03-20

## 2017-08-08 RX ORDER — TRIAMCINOLONE ACETONIDE 1 MG/G
OINTMENT TOPICAL
Qty: 80 G | Refills: 0 | Status: SHIPPED | OUTPATIENT
Start: 2017-08-08 | End: 2018-03-20

## 2017-08-08 NOTE — PROGRESS NOTES
SUBJECTIVE:                                                    Yakov Cantrell is a 27 year old male who presents to clinic today for the following health issues:    Concern - Shingles  Onset: x3 weeks    Description:   Follow up    Intensity: 4-7/10    Progression of Symptoms:  improving and waxing and waning    Accompanying Signs & Symptoms:  Itching with pain    Previous history of similar problem:   None    Precipitating factors:   Worsened by: scratching    Alleviating factors:  Improved by: cold washcloth    Therapies Tried and outcome: Valtrex and Toradol; pain meds are not working      Also having persistent dry skin on hands despite otc lotions, steroid creams, and limiting hand washing. No improvement.     Problem list and histories reviewed & adjusted, as indicated.  Additional history: as documented    Patient Active Problem List   Diagnosis     CARDIOVASCULAR SCREENING; LDL GOAL LESS THAN 160     Herpes zoster without complication     History reviewed. No pertinent surgical history.    Social History   Substance Use Topics     Smoking status: Current Some Day Smoker     Smokeless tobacco: Never Used     Alcohol use 0.0 oz/week     0 Standard drinks or equivalent per week      Comment: rarely     Family History   Problem Relation Age of Onset     Abdominal Aortic Aneurysm Maternal Grandfather          Current Outpatient Prescriptions   Medication Sig Dispense Refill     lidocaine (LIDODERM) 5 % Patch Apply up to 3 patches to painful area at once for up to 12 h within a 24 h period.  Remove after 12 hours. 30 patch 0     triamcinolone (KENALOG) 0.1 % ointment Apply sparingly to affected area three times daily for 14 days. 80 g 0     ketorolac (TORADOL) 10 MG tablet Take 1 tablet (10 mg) by mouth every 6 hours as needed for moderate pain 20 tablet 0     albuterol (PROAIR HFA, PROVENTIL HFA, VENTOLIN HFA) 108 (90 BASE) MCG/ACT inhaler Inhale 2 puffs into the lungs every 6 hours as needed for shortness of  breath / dyspnea or wheezing 1 Inhaler 1     No Known Allergies  BP Readings from Last 3 Encounters:   08/08/17 130/78   07/19/17 131/83   07/17/17 110/70    Wt Readings from Last 3 Encounters:   08/08/17 276 lb 6.4 oz (125.4 kg)   07/19/17 280 lb (127 kg)   06/22/17 282 lb (127.9 kg)                        Reviewed and updated as needed this visit by clinical staffTobacco  Allergies  Meds  Problems  Med Hx  Surg Hx  Fam Hx  Soc Hx        Reviewed and updated as needed this visit by Provider  Allergies  Meds  Problems         ROS:  Constitutional, skin, neuro, cardiovascular, pulmonary, gi and gu systems are negative, except as otherwise noted.      OBJECTIVE:   /78  Pulse 80  Temp 99  F (37.2  C) (Oral)  Resp 20  Wt 276 lb 6.4 oz (125.4 kg)  SpO2 99%  BMI 35.01 kg/m2  Body mass index is 35.01 kg/(m^2).  GENERAL: healthy, alert and no distress  SKIN: scarred lesions noted along right torso, back and abdomen. No surrounding erythema or edema. No drainage. Dry patches noted on sporadic fingers bilaterally.   PSYCH: mentation appears normal, affect normal/bright    Diagnostic Test Results:  none     ASSESSMENT/PLAN:       (B02.9) Herpes zoster without complication  (primary encounter diagnosis)  Comment: continued symptoms despite treatment. Likely residual inflammation. Will attempt topical lidocaine and otc antihistamine creams. If no improvement in 2 weeks, will consider neurology referral for possible early onset PHN. No evidence of secondary bacterial infection at this time.  Plan: lidocaine (LIDODERM) 5 % Patch        -Medication use and side effects discussed with the patient. Patient is in complete understanding and agreement with plan.       (L30.9) Dermatitis  Comment: evident on exam. Will attempt topical steroid ointment. If no improvement in 2 weeks, will have see derm.   Plan: triamcinolone (KENALOG) 0.1 % ointment        -Medication use and side effects discussed with the patient.  Patient is in complete understanding and agreement with plan.         Follow up: as above     Jr Person PA-C  [facility]

## 2017-08-08 NOTE — MR AVS SNAPSHOT
"              After Visit Summary   2017    Yakov Cantrell    MRN: [id number]           Patient Information     Date Of Birth          1990        Visit Information        Provider Department      2017 9:45 AM Jr Person PA-C [facility]        Today's Diagnoses     Herpes zoster without complication    -  1    Dermatitis           Follow-ups after your visit        Who to contact     If you have questions or need follow up information about today's clinic visit or your schedule please contact [facility] directly at [phone].  Normal or non-critical lab and imaging results will be communicated to you by Options Awayhart, letter or phone within 4 business days after the clinic has received the results. If you do not hear from us within 7 days, please contact the clinic through Options Awayhart or phone. If you have a critical or abnormal lab result, we will notify you by phone as soon as possible.  Submit refill requests through [employer] or call your pharmacy and they will forward the refill request to us. Please allow 3 business days for your refill to be completed.          Additional Information About Your Visit        MyChart Information     [employer] lets you send messages to your doctor, view your test results, renew your prescriptions, schedule appointments and more. To sign up, go to [email or web address] . Click on \"Log in\" on the left side of the screen, which will take you to the Welcome page. Then click on \"Sign up Now\" on the right side of the page.     You will be asked to enter the access code listed below, as well as some personal information. Please follow the directions to create your username and password.     Your access code is: [email or web address]  Expires: 2017 11:10 AM     Your access code will  in 90 days. If you need help or a new code, please call your [facility] or [phone].        Care EveryWhere ID     This is your Care " EveryWhere ID. This could be used by other organizations to access your [place] medical records  [id number]        Your Vitals Were     Pulse Temperature Respirations Pulse Oximetry BMI (Body Mass Index)       80 99  F (37.2  C) (Oral) 20 99% 35.01 kg/m2        Blood Pressure from Last 3 Encounters:   08/08/17 (!) 150/94   07/19/17 131/83   07/17/17 110/70    Weight from Last 3 Encounters:   08/08/17 276 lb 6.4 oz (125.4 kg)   07/19/17 280 lb (127 kg)   06/22/17 282 lb (127.9 kg)              Today, you had the following     No orders found for display         Today's Medication Changes          These changes are accurate as of: 8/8/17 10:18 AM.  If you have any questions, ask your nurse or doctor.               Start taking these medicines.        Dose/Directions    lidocaine 5 % Patch   Commonly known as:  LIDODERM   Used for:  Herpes zoster without complication   Started by:  Jr Person PA-C        Apply up to 3 patches to painful area at once for up to 12 h within a 24 h period.  Remove after 12 hours.   Quantity:  30 patch   Refills:  0       triamcinolone 0.1 % ointment   Commonly known as:  KENALOG   Used for:  Dermatitis   Started by:  Jr Person PA-C        Apply sparingly to affected area three times daily for 14 days.   Quantity:  80 g   Refills:  0            Where to get your medicines      These medications were sent to [facility] [address] [address]     Phone:  [phone]     lidocaine 5 % Patch    triamcinolone 0.1 % ointment                Primary Care Provider    Physician No Ref-Primary       No address on file        Equal Access to Services     FRANKI HELMS AH: Hadii navi Lee, walorida luqadaha, qaybta kaalmada adegioyada, hattie emery. So [facility] [phone].    ATENCIÓN: Si habla español, tiene a hayse disposición servicios gratuitos de asistencia  lingüística. Marina al [phone].    We comply with applicable federal civil rights laws and [place] laws. We do not discriminate on the basis of race, color, national origin, age, disability sex, sexual orientation or gender identity.            Thank you!     Thank you for choosing [facility]  for your care. Our goal is always to provide you with excellent care. Hearing back from our patients is one way we can continue to improve our services. Please take a few minutes to complete the written survey that you may receive in the mail after your visit with us. Thank you!             Your Updated Medication List - Protect others around you: Learn how to safely use, store and throw away your medicines at www.disposemymeds.org.          This list is accurate as of: 8/8/17 10:18 AM.  Always use your most recent med list.                   Brand Name Dispense Instructions for use Diagnosis    albuterol 108 (90 BASE) MCG/ACT Inhaler    PROAIR HFA/PROVENTIL HFA/VENTOLIN HFA    1 Inhaler    Inhale 2 puffs into the lungs every 6 hours as needed for shortness of breath / dyspnea or wheezing        ketorolac 10 MG tablet    TORADOL    20 tablet    Take 1 tablet (10 mg) by mouth every 6 hours as needed for moderate pain    Abdominal pain, right upper quadrant       lidocaine 5 % Patch    LIDODERM    30 patch    Apply up to 3 patches to painful area at once for up to 12 h within a 24 h period.  Remove after 12 hours.    Herpes zoster without complication       triamcinolone 0.1 % ointment    KENALOG    80 g    Apply sparingly to affected area three times daily for 14 days.    Dermatitis

## 2017-08-08 NOTE — TELEPHONE ENCOUNTER
I would not have expected patient to continue to have symptoms this late in course. It has been ~3 weeks since treatment. I recommend patient RTC to make sure a secondary infection hasn't developed.     -Max Person, PAC

## 2017-08-08 NOTE — TELEPHONE ENCOUNTER
Pt calls, informed, left work due to pain, f/u appointment made  Sybil Walls RN, BSN  Message handled by Nurse Triage.

## 2017-08-08 NOTE — TELEPHONE ENCOUNTER
Patient calling and states he is having itching and burning sensation from the shingles.  Keeping him from sleeping at night.  Having a hard time at work sitting.  Wondering about something for this.  Please advise.  Call him back at [phone].  Nhi Dykes RN

## 2017-08-08 NOTE — LETTER
[facility]  [address] [place] [address]  Phone: [phone]    August 8, 2017        Yakov Cantrell  [address] [place] [address] [place] [id number]          To whom it may concern:    RE: Yakov LIPSCOMB Óscar    Patient was seen and treated today at our clinic and missed work.    Please contact me for questions or concerns.      Sincerely,        Jr Person PA-C

## 2017-12-12 ENCOUNTER — TELEPHONE (OUTPATIENT)
Dept: FAMILY MEDICINE | Facility: CLINIC | Age: 27
End: 2017-12-12

## 2017-12-12 NOTE — TELEPHONE ENCOUNTER
"Pt calls, feels lumps in \"stomach area\" x 1 month, now burning pain in left side of abdomen today, happens more frequently, episode lasts 20 seconds, had shingles this summer in other side, has never been seen for isolated lumps, has appointment 12/13, otherwise doing well, recommend UC after work if concerns or appointment tomorrow prn, pt will see how it goes and go to UC if needed, will cancel appointment Thursday if no longer needed    Sybil Walls RN, BSN  Message handled by Nurse Triage.      "

## 2018-01-21 ENCOUNTER — OFFICE VISIT (OUTPATIENT)
Dept: URGENT CARE | Facility: URGENT CARE | Age: 28
End: 2018-01-21
Payer: COMMERCIAL

## 2018-01-21 VITALS
BODY MASS INDEX: 34.32 KG/M2 | TEMPERATURE: 97.3 F | HEART RATE: 68 BPM | OXYGEN SATURATION: 98 % | HEIGHT: 75 IN | WEIGHT: 276 LBS | SYSTOLIC BLOOD PRESSURE: 132 MMHG | DIASTOLIC BLOOD PRESSURE: 78 MMHG

## 2018-01-21 DIAGNOSIS — H53.8 BLURRED VISION: Primary | ICD-10-CM

## 2018-01-21 DIAGNOSIS — Z23 NEED FOR PROPHYLACTIC VACCINATION AND INOCULATION AGAINST INFLUENZA: ICD-10-CM

## 2018-01-21 LAB — GLUCOSE BLD-MCNC: 96 MG/DL (ref 70–99)

## 2018-01-21 PROCEDURE — 36415 COLL VENOUS BLD VENIPUNCTURE: CPT | Performed by: FAMILY MEDICINE

## 2018-01-21 PROCEDURE — 82947 ASSAY GLUCOSE BLOOD QUANT: CPT | Performed by: FAMILY MEDICINE

## 2018-01-21 PROCEDURE — 99213 OFFICE O/P EST LOW 20 MIN: CPT | Mod: 25 | Performed by: FAMILY MEDICINE

## 2018-01-21 PROCEDURE — 90688 IIV4 VACCINE SPLT 0.5 ML IM: CPT | Performed by: FAMILY MEDICINE

## 2018-01-21 PROCEDURE — 90471 IMMUNIZATION ADMIN: CPT | Performed by: FAMILY MEDICINE

## 2018-01-21 NOTE — MR AVS SNAPSHOT
"              After Visit Summary   2018    Yakov Cantrell    MRN: [id number]           Patient Information     Date Of Birth          1990        Visit Information        Provider Department      2018 10:00 AM Nani Llanos MD [facility]        Today's Diagnoses     Blurred vision    -  1    Need for prophylactic vaccination and inoculation against influenza           Follow-ups after your visit        Who to contact     If you have questions or need follow up information about today's clinic visit or your schedule please contact [facility] directly at [phone].  Normal or non-critical lab and imaging results will be communicated to you by [employer]xacthart, letter or phone within 4 business days after the clinic has received the results. If you do not hear from us within 7 days, please contact the clinic through Edxacthart or phone. If you have a critical or abnormal lab result, we will notify you by phone as soon as possible.  Submit refill requests through [employer] or call your pharmacy and they will forward the refill request to us. Please allow 3 business days for your refill to be completed.          Additional Information About Your Visit        MyChart Information     [employer] lets you send messages to your doctor, view your test results, renew your prescriptions, schedule appointments and more. To sign up, go to [email or web address] . Click on \"Log in\" on the left side of the screen, which will take you to the Welcome page. Then click on \"Sign up Now\" on the right side of the page.     You will be asked to enter the access code listed below, as well as some personal information. Please follow the directions to create your username and password.     Your access code is: [email or web address]  Expires: 3/14/2018  6:28 PM     Your access code will  in 90 days. If you need help or a new code, please call your [place] clinic or [phone].        Care " "EveryWhere ID     This is your Care EveryWhere ID. This could be used by other organizations to access your [place] medical records  [id number]        Your Vitals Were     Pulse Temperature Height Pulse Oximetry BMI (Body Mass Index)       68 97.3  F (36.3  C) (Oral) 6' 2.5\" (1.892 m) 98% 34.96 kg/m2        Blood Pressure from Last 3 Encounters:   01/21/18 132/78   08/08/17 130/78   07/19/17 131/83    Weight from Last 3 Encounters:   01/21/18 276 lb (125.2 kg)   08/08/17 276 lb 6.4 oz (125.4 kg)   07/19/17 280 lb (127 kg)              We Performed the Following     FLU VACCINE, 3 YRS +, IM (QUADRIVALENT W/PRESERVATIVES/MULTI-DOSE) [54118]     Glucose, whole blood     Vaccine Administration, Initial [78918]        Primary Care Provider Fax #    Physician No Ref-Primary [phone]       No address on file        Equal Access to Services     FRANKI HELMS : Hadii navi ku hadasho Sojasali, waaxda luqadaha, qaybta kaalmada adeegyada, waxay panchito lopez . So [facility] [phone].    ATENCIÓN: Si habla español, tiene a hayes disposición servicios gratuitos de asistencia lingüística. Llame al [phone].    We comply with applicable federal civil rights laws and [place] laws. We do not discriminate on the basis of race, color, national origin, age, disability, sex, sexual orientation, or gender identity.            Thank you!     Thank you for choosing [facility]  for your care. Our goal is always to provide you with excellent care. Hearing back from our patients is one way we can continue to improve our services. Please take a few minutes to complete the written survey that you may receive in the mail after your visit with us. Thank you!             Your Updated Medication List - Protect others around you: Learn how to safely use, store and throw away your medicines at www.disposemymeds.org.          This list is accurate as of: 1/21/18  2:56 PM.  Always use your most recent med " list.                   Brand Name Dispense Instructions for use Diagnosis    albuterol 108 (90 BASE) MCG/ACT Inhaler    PROAIR HFA/PROVENTIL HFA/VENTOLIN HFA    1 Inhaler    Inhale 2 puffs into the lungs every 6 hours as needed for shortness of breath / dyspnea or wheezing        ketorolac 10 MG tablet    TORADOL    20 tablet    Take 1 tablet (10 mg) by mouth every 6 hours as needed for moderate pain    Abdominal pain, right upper quadrant       lidocaine 5 % Patch    LIDODERM    30 patch    Apply up to 3 patches to painful area at once for up to 12 h within a 24 h period.  Remove after 12 hours.    Herpes zoster without complication       triamcinolone 0.1 % ointment    KENALOG    80 g    Apply sparingly to affected area three times daily for 14 days.    Dermatitis

## 2018-01-21 NOTE — PROGRESS NOTES
SUBJECTIVE:     Chief Complaint   Patient presents with     Urgent Care     concerend for Diabetes      Eye Problem     blurry spots on eyes going on for week      Flu Shot     History of Present Illness:  Yakov Cantrell is a 27 year old male who presents complaining of mild right eye transient blurred vision of right lateral lower field  For about 4 seconds , then the blurriness disappears,  Later it returns briefly,  Intermittently during the past week  He has noticed this when he has his contacts in,  Has used his glasses in the past few hours and has not noted the blurry area.      Associated Signs and Symptoms: none-  He is concerned this is a sign of diabetes-  He would like glucose test  Treatment measures tried include: none     Past Medical History:   Diagnosis Date     CARDIOVASCULAR SCREENING; LDL GOAL LESS THAN 160 6/19/2014       ALLERGIES:  Review of patient's allergies indicates no known allergies.      Current Outpatient Prescriptions on File Prior to Visit:  lidocaine (LIDODERM) 5 % Patch Apply up to 3 patches to painful area at once for up to 12 h within a 24 h period.  Remove after 12 hours.   triamcinolone (KENALOG) 0.1 % ointment Apply sparingly to affected area three times daily for 14 days.   ketorolac (TORADOL) 10 MG tablet Take 1 tablet (10 mg) by mouth every 6 hours as needed for moderate pain   albuterol (PROAIR HFA, PROVENTIL HFA, VENTOLIN HFA) 108 (90 BASE) MCG/ACT inhaler Inhale 2 puffs into the lungs every 6 hours as needed for shortness of breath / dyspnea or wheezing     No current facility-administered medications on file prior to visit.     Social History   Substance Use Topics     Smoking status: Current Some Day Smoker     Smokeless tobacco: Never Used     Alcohol use 0.0 oz/week     0 Standard drinks or equivalent per week      Comment: rarely       Family History   Problem Relation Age of Onset     Abdominal Aortic Aneurysm Maternal Grandfather   "        ROS:  CONSTITUTIONAL:NEGATIVE for fever, chills,   INTEGUMENTARY/SKIN: NEGATIVE for worrisome rashes,    ENT/MOUTH: NEGATIVE for ear, mouth and throat problems  RESP:NEGATIVE for significant cough or SOB  GI: NEGATIVE for nausea, abdominal pain,      OBJECTIVE:  /78  Pulse 68  Temp 97.3  F (36.3  C) (Oral)  Ht 6' 2.5\" (1.892 m)  Wt 276 lb (125.2 kg)  SpO2 98%  BMI 34.96 kg/m2     Visual acuity-  Bilateral 20/32    Left 20/40   Right 20/40  General: no acute distress  Right eye:ALONSO, EOMI, fundi normal, corneas normal, no foreign bodies, visual acuity normal both eyes, no periorbital cellulitis  Left eye: ALONSO, EOMI, fundi normal, corneas normal, no foreign bodies, visual acuity normal both eyes, no periorbital cellulitis       GENERAL APPEARANCE: healthy, alert and no distress  HENT: ear canals and TM's normal.  Nose and mouth without ulcers, erythema or lesions  NECK: supple, nontender, no lymphadenopathy  RESP: lungs clear to auscultation - no rales, rhonchi or wheezes  CV: regular rates and rhythm, normal S1 S2, no murmur noted  NEURO: Normal strength and tone, sensory exam grossly normal,  normal speech and mentation  SKIN: no suspicious lesions or rashes    Results for orders placed or performed in visit on 01/21/18   Glucose, whole blood   Result Value Ref Range    Glucose Whole Blood 96 70 - 99 mg/dL         ASSESSMENT/ PLAN  Blurred vision     - Glucose, whole blood    Reassured that glucose normal-  No diabetes  No obvious abnormalities on physical exam-  Recommend a complete eye exam with an eye specialist    To ER if acute change in vision,      Need for prophylactic vaccination and inoculation against influenza      - FLU VACCINE, 3 YRS +, IM (QUADRIVALENT W/PRESERVATIVES/MULTI-DOSE) [40401]  - Vaccine Administration, Initial [77545]            "

## 2018-01-21 NOTE — PROGRESS NOTES

## 2018-01-21 NOTE — NURSING NOTE
"Chief Complaint   Patient presents with     Urgent Care     Eye Problem     blurry spots on eyes going on for week        Initial /78  Pulse 68  Temp 97.3  F (36.3  C) (Oral)  Ht 6' 2.5\" (1.892 m)  Wt 276 lb (125.2 kg)  SpO2 98%  BMI 34.96 kg/m2 Estimated body mass index is 34.96 kg/(m^2) as calculated from the following:    Height as of this encounter: 6' 2.5\" (1.892 m).    Weight as of this encounter: 276 lb (125.2 kg).  Medication Reconciliation: incomplete     Cathy Garcia CMA      "

## 2018-03-20 ENCOUNTER — OFFICE VISIT (OUTPATIENT)
Dept: URGENT CARE | Facility: URGENT CARE | Age: 28
End: 2018-03-20
Payer: COMMERCIAL

## 2018-03-20 VITALS
BODY MASS INDEX: 35.98 KG/M2 | SYSTOLIC BLOOD PRESSURE: 130 MMHG | TEMPERATURE: 97.1 F | RESPIRATION RATE: 16 BRPM | OXYGEN SATURATION: 97 % | HEART RATE: 72 BPM | WEIGHT: 284 LBS | DIASTOLIC BLOOD PRESSURE: 84 MMHG

## 2018-03-20 DIAGNOSIS — J45.21 MILD INTERMITTENT EXACERBATION OF REACTIVE AIRWAY DISEASE: ICD-10-CM

## 2018-03-20 DIAGNOSIS — J06.9 VIRAL URI: ICD-10-CM

## 2018-03-20 DIAGNOSIS — R06.2 WHEEZING: Primary | ICD-10-CM

## 2018-03-20 PROCEDURE — 94640 AIRWAY INHALATION TREATMENT: CPT | Performed by: PHYSICIAN ASSISTANT

## 2018-03-20 PROCEDURE — 99214 OFFICE O/P EST MOD 30 MIN: CPT | Mod: 25 | Performed by: PHYSICIAN ASSISTANT

## 2018-03-20 RX ORDER — ALBUTEROL SULFATE 90 UG/1
2 AEROSOL, METERED RESPIRATORY (INHALATION) EVERY 6 HOURS
Qty: 1 INHALER | Refills: 1 | Status: SHIPPED | OUTPATIENT
Start: 2018-03-20 | End: 2018-06-16

## 2018-03-20 RX ORDER — PREDNISONE 20 MG/1
40 TABLET ORAL DAILY
Qty: 10 TABLET | Refills: 0 | Status: SHIPPED | OUTPATIENT
Start: 2018-03-20 | End: 2018-03-25

## 2018-03-20 NOTE — MR AVS SNAPSHOT
"              After Visit Summary   3/20/2018    Yakov Cantrell    MRN: [id number]           Patient Information     Date Of Birth          1990        Visit Information        Provider Department      3/20/2018 4:45 PM Oni Mc PA-C [facility]        Today's Diagnoses     Wheezing    -  1    Mild intermittent exacerbation of reactive airway disease          Care Instructions    Begin oral prednisone tomorrow am with food   Begin albuterol up to four times daily as needed           Follow-ups after your visit        Who to contact     If you have questions or need follow up information about today's clinic visit or your schedule please contact [facility] directly at [phone].  Normal or non-critical lab and imaging results will be communicated to you by Gibberinhart, letter or phone within 4 business days after the clinic has received the results. If you do not hear from us within 7 days, please contact the clinic through Gibberin[employer] or phone. If you have a critical or abnormal lab result, we will notify you by phone as soon as possible.  Submit refill requests through [employer] or call your pharmacy and they will forward the refill request to us. Please allow 3 business days for your refill to be completed.          Additional Information About Your Visit        MyChart Information     [employer] lets you send messages to your doctor, view your test results, renew your prescriptions, schedule appointments and more. To sign up, go to [email or web address] . Click on \"Log in\" on the left side of the screen, which will take you to the Welcome page. Then click on \"Sign up Now\" on the right side of the page.     You will be asked to enter the access code listed below, as well as some personal information. Please follow the directions to create your username and password.     Your access code is: [email or web address]  Expires: 2018  5:44 PM     Your access code will  in " 90 days. If you need help or a new code, please call your [place] clinic or [phone].        Care EveryWhere ID     This is your Care EveryWhere ID. This could be used by other organizations to access your [place] medical records  [id number]        Your Vitals Were     Pulse Temperature Respirations Pulse Oximetry BMI (Body Mass Index)       72 97.1  F (36.2  C) (Tympanic) 16 97% 35.98 kg/m2        Blood Pressure from Last 3 Encounters:   03/20/18 130/84   01/21/18 132/78   08/08/17 130/78    Weight from Last 3 Encounters:   03/20/18 284 lb (128.8 kg)   01/21/18 276 lb (125.2 kg)   08/08/17 276 lb 6.4 oz (125.4 kg)              We Performed the Following     ALBUTEROL/IPRATROPIUM 3ML NEB     INHALATION/NEBULIZER TREATMENT, INITIAL          Today's Medication Changes          These changes are accurate as of 3/20/18  5:47 PM.  If you have any questions, ask your nurse or doctor.               Start taking these medicines.        Dose/Directions    predniSONE 20 MG tablet   Commonly known as:  DELTASONE   Used for:  Mild intermittent exacerbation of reactive airway disease   Started by:  Oni Mc PA-C        Dose:  40 mg   Take 2 tablets (40 mg) by mouth daily for 5 days   Quantity:  10 tablet   Refills:  0         These medicines have changed or have updated prescriptions.        Dose/Directions    * albuterol 108 (90 BASE) MCG/ACT Inhaler   Commonly known as:  PROAIR HFA/PROVENTIL HFA/VENTOLIN HFA   This may have changed:  Another medication with the same name was added. Make sure you understand how and when to take each.   Changed by:  Oni Mc PA-C        Dose:  2 puff   Inhale 2 puffs into the lungs every 6 hours as needed for shortness of breath / dyspnea or wheezing   Quantity:  1 Inhaler   Refills:  1       * albuterol 108 (90 BASE) MCG/ACT Inhaler   Commonly known as:  PROAIR HFA   This may have changed:  You were already taking a medication with the same name,  and this prescription was added. Make sure you understand how and when to take each.   Used for:  Mild intermittent exacerbation of reactive airway disease   Changed by:  Oni Mc PA-C        Dose:  2 puff   Inhale 2 puffs into the lungs every 6 hours   Quantity:  1 Inhaler   Refills:  1       * Notice:  This list has 2 medication(s) that are the same as other medications prescribed for you. Read the directions carefully, and ask your doctor or other care provider to review them with you.         Where to get your medicines      These medications were sent to [facility] [place], [place] - [address]   [address]  [address], [place] [address]     Phone:  [phone]     albuterol 108 (90 BASE) MCG/ACT Inhaler    predniSONE 20 MG tablet                Primary Care Provider Fax #    Physician No Ref-Primary [phone]       No address on file        Equal Access to Services     [facility]: Hadii navi prabhakaro Sonéstor, waaxda luqadaha, qaybta kaalmada nikolaiyahattie, hattie lopez . So [facility] [phone].    ATENCIÓN: Si habla español, tiene a hayes disposición servicios gratuitos de asistencia lingüística. Llame al [phone].    We comply with applicable federal civil rights laws and [place] laws. We do not discriminate on the basis of race, color, national origin, age, disability, sex, sexual orientation, or gender identity.            Thank you!     Thank you for choosing [facility]  for your care. Our goal is always to provide you with excellent care. Hearing back from our patients is one way we can continue to improve our services. Please take a few minutes to complete the written survey that you may receive in the mail after your visit with us. Thank you!             Your Updated Medication List - Protect others around you: Learn how to safely use, store and throw away your medicines at www.disposemymeds.org.           This list is accurate as of 3/20/18  5:47 PM.  Always use your most recent med list.                   Brand Name Dispense Instructions for use Diagnosis    * albuterol 108 (90 BASE) MCG/ACT Inhaler    PROAIR HFA/PROVENTIL HFA/VENTOLIN HFA    1 Inhaler    Inhale 2 puffs into the lungs every 6 hours as needed for shortness of breath / dyspnea or wheezing        * albuterol 108 (90 BASE) MCG/ACT Inhaler    PROAIR HFA    1 Inhaler    Inhale 2 puffs into the lungs every 6 hours    Mild intermittent exacerbation of reactive airway disease       predniSONE 20 MG tablet    DELTASONE    10 tablet    Take 2 tablets (40 mg) by mouth daily for 5 days    Mild intermittent exacerbation of reactive airway disease       * Notice:  This list has 2 medication(s) that are the same as other medications prescribed for you. Read the directions carefully, and ask your doctor or other care provider to review them with you.

## 2018-03-20 NOTE — NURSING NOTE
"Chief Complaint   Patient presents with     Urgent Care     Cold since friday-Patient states he feels pain and tingling in his sternum while breathing-since saturday.        Initial /84 (BP Location: Right arm)  Pulse 72  Temp 97.1  F (36.2  C) (Tympanic)  Resp 16  Wt 284 lb (128.8 kg)  SpO2 97%  BMI 35.98 kg/m2 Estimated body mass index is 35.98 kg/(m^2) as calculated from the following:    Height as of 1/21/18: 6' 2.5\" (1.892 m).    Weight as of this encounter: 284 lb (128.8 kg).  Medication Reconciliation: incomplete   Conchita Zamora CMA (AAMA)            "

## 2018-03-20 NOTE — PROGRESS NOTES
SUBJECTIVE:   Yakov Cantrell is a 27 year old male who presents to clinic today for the following health issues:    Acute Illness   Acute illness concerns: cough  Onset: 3-4 days ago    Fever: no    Chills/Sweats: no    Headache (location?): no    Sinus Pressure:no    Conjunctivitis:  no    Ear Pain: no    Rhinorrhea: yes    Congestion: no    Sore Throat: no     Cough: YES - overall improving    Wheeze: no    Sob present    Pain at the xiphoid with taking a deep breath     No history of reflux    Decreased Appetite: no    Nausea: no    Vomiting: no    Diarrhea:  no    Fatigue/Achiness: yes    Sick/Strep Exposure: no     Therapies Tried and outcome: nyquil, dayquil, fluids  History of asthma as a child. No nebs or inhalers currently.   No history of allergies.   Smokes once monthly.  Work: office setting    ROS:  ROS otherwise negative    OBJECTIVE:                                                    /84 (BP Location: Right arm)  Pulse 72  Temp 97.1  F (36.2  C) (Tympanic)  Resp 16  Wt 284 lb (128.8 kg)  SpO2 97%  BMI 35.98 kg/m2  Body mass index is 35.98 kg/(m^2).   GENERAL: alert, no distress  HENT: ear canals- normal; TMs- normal; Nose- normal; Mouth- no ulcers, no lesions  NECK: no tenderness, no adenopathy  RESP: moderately diminished breath sounds with wheezing and rhonchi throughout  CV: regular rates and rhythm, normal S1 S2, no S3 or S4 and no murmur, no click or rub    Diagnostic test results:  duoneb administered with improved airflow and diminished wheezing  No results found for this or any previous visit (from the past 24 hour(s)).     ASSESSMENT/PLAN:                                                    (R06.2) Wheezing  (primary encounter diagnosis)  Comment: improved.  Plan: ALBUTEROL/IPRATROPIUM 3ML NEB,         INHALATION/NEBULIZER TREATMENT, INITIAL            (J45.21) Mild intermittent exacerbation of reactive airway disease  Comment: begin oral pred x 5 days and albuterol four times  daily.   Plan: albuterol (PROAIR HFA) 108 (90 BASE) MCG/ACT         Inhaler, predniSONE (DELTASONE) 20 MG tablet            (J06.9,  B97.89) Viral URI  Comment: overall improving  Plan:       [facility]

## 2018-06-15 ENCOUNTER — OFFICE VISIT (OUTPATIENT)
Dept: FAMILY MEDICINE | Facility: CLINIC | Age: 28
End: 2018-06-15
Payer: COMMERCIAL

## 2018-06-15 VITALS
SYSTOLIC BLOOD PRESSURE: 136 MMHG | RESPIRATION RATE: 16 BRPM | HEART RATE: 106 BPM | WEIGHT: 293 LBS | TEMPERATURE: 97.5 F | DIASTOLIC BLOOD PRESSURE: 89 MMHG | BODY MASS INDEX: 37.12 KG/M2

## 2018-06-15 DIAGNOSIS — J45.30 MILD PERSISTENT ASTHMA, UNSPECIFIED WHETHER COMPLICATED: ICD-10-CM

## 2018-06-15 DIAGNOSIS — J45.901 MILD ASTHMA WITH EXACERBATION, UNSPECIFIED WHETHER PERSISTENT: Primary | ICD-10-CM

## 2018-06-15 DIAGNOSIS — Z11.4 SCREENING FOR HIV (HUMAN IMMUNODEFICIENCY VIRUS): ICD-10-CM

## 2018-06-15 PROCEDURE — 99214 OFFICE O/P EST MOD 30 MIN: CPT | Performed by: FAMILY MEDICINE

## 2018-06-15 RX ORDER — FLUTICASONE PROPIONATE 110 UG/1
1 AEROSOL, METERED RESPIRATORY (INHALATION) 2 TIMES DAILY
Qty: 3 INHALER | Refills: 1 | Status: SHIPPED | OUTPATIENT
Start: 2018-06-15 | End: 2018-06-16 | Stop reason: ALTCHOICE

## 2018-06-15 RX ORDER — PEAK FLOW METER
EACH MISCELLANEOUS SEE ADMIN INSTRUCTIONS
Refills: 0 | COMMUNITY
Start: 2018-03-26 | End: 2020-04-10

## 2018-06-15 RX ORDER — ALBUTEROL SULFATE 0.83 MG/ML
SOLUTION RESPIRATORY (INHALATION)
Refills: 1 | COMMUNITY
Start: 2018-03-26 | End: 2021-12-10

## 2018-06-15 RX ORDER — PREDNISONE 20 MG/1
TABLET ORAL
Qty: 20 TABLET | Refills: 0 | Status: SHIPPED | OUTPATIENT
Start: 2018-06-15 | End: 2018-06-29

## 2018-06-15 NOTE — MR AVS SNAPSHOT
After Visit Summary   6/15/2018    Yakov Cantrell    MRN: [id number]           Patient Information     Date Of Birth          1990        Visit Information        Provider Department      6/15/2018 2:15 PM Dalia Stark MD [facility]        Today's Diagnoses     Mild asthma with exacerbation, unspecified whether persistent    -  1    Screening for HIV (human immunodeficiency virus)        Mild persistent asthma, unspecified whether complicated           Follow-ups after your visit        Follow-up notes from your care team     Return in about 4 weeks (around 7/13/2018).      Your next 10 appointments already scheduled     Jul 19, 2018  5:30 PM CDT   SHORT with Dalia Stark MD   [facility] ([facility])    [address] [place] [address] [phone]              Who to contact     If you have questions or need follow up information about today's clinic visit or your schedule please contact [facility] directly at [phone].  Normal or non-critical lab and imaging results will be communicated to you by MyChart, letter or phone within 4 business days after the clinic has received the results. If you do not hear from us within 7 days, please contact the clinic through MyChart or phone. If you have a critical or abnormal lab result, we will notify you by phone as soon as possible.  Submit refill requests through [employer] or call your pharmacy and they will forward the refill request to us. Please allow 3 business days for your refill to be completed.          Additional Information About Your Visit        Care EveryWhere ID     This is your Care EveryWhere ID. This could be used by other organizations to access your [place] medical records  [id number]        Your Vitals Were     Pulse Temperature Respirations BMI (Body Mass Index)          106 97.5  F (36.4  C) (Oral) 16 37.12 kg/m2         Blood Pressure  from Last 3 Encounters:   06/15/18 136/89   03/20/18 130/84   01/21/18 132/78    Weight from Last 3 Encounters:   06/15/18 293 lb (132.9 kg)   03/20/18 284 lb (128.8 kg)   01/21/18 276 lb (125.2 kg)              Today, you had the following     No orders found for display         Today's Medication Changes          These changes are accurate as of 6/15/18  3:30 PM.  If you have any questions, ask your nurse or doctor.               Start taking these medicines.        Dose/Directions    fluticasone 110 MCG/ACT Inhaler   Commonly known as:  FLOVENT HFA   Used for:  Mild persistent asthma, unspecified whether complicated   Started by:  Dalia Stark MD        Dose:  1 puff   Inhale 1 puff into the lungs 2 times daily   Quantity:  3 Inhaler   Refills:  1       predniSONE 20 MG tablet   Commonly known as:  DELTASONE   Used for:  Mild asthma with exacerbation, unspecified whether persistent   Started by:  Dalia Stark MD        Take 3 tabs (60 mg) by mouth daily x 3 days, 2 tabs (40 mg) daily x 3 days, 1 tab (20 mg) daily x 3 days, then 1/2 tab (10 mg) x 3 days.   Quantity:  20 tablet   Refills:  0            Where to get your medicines      These medications were sent to [facility] [address] [place] [address] 42  AT [address] [address]     Phone:  [phone]     fluticasone 110 MCG/ACT Inhaler    predniSONE 20 MG tablet                Primary Care Provider Fax #    Physician No Ref-Primary [phone]       No address on file        Equal Access to Services     FRANKI HELMS AH: Lauren Lee, abraham polanco, hattie jain. So [facility] [phone].    ATENCIÓN: Si habla español, tiene a hayes disposición servicios gratuitos de asistencia lingüística. Llame al [phone].    We comply with applicable federal civil rights laws and [place] laws. We do not discriminate on the basis  of race, color, national origin, age, disability, sex, sexual orientation, or gender identity.            Thank you!     Thank you for choosing [facility]  for your care. Our goal is always to provide you with excellent care. Hearing back from our patients is one way we can continue to improve our services. Please take a few minutes to complete the written survey that you may receive in the mail after your visit with us. Thank you!             Your Updated Medication List - Protect others around you: Learn how to safely use, store and throw away your medicines at www.disposemymeds.org.          This list is accurate as of 6/15/18  3:30 PM.  Always use your most recent med list.                   Brand Name Dispense Instructions for use Diagnosis    * albuterol 108 (90 Base) MCG/ACT Inhaler    PROAIR HFA    1 Inhaler    Inhale 2 puffs into the lungs every 6 hours    Mild intermittent exacerbation of reactive airway disease       * albuterol (2.5 MG/3ML) 0.083% neb solution      INHALE 1 VIAL VIA NEB Q 4 - 6 H PRF WHEEZING.        fluticasone 110 MCG/ACT Inhaler    FLOVENT HFA    3 Inhaler    Inhale 1 puff into the lungs 2 times daily    Mild persistent asthma, unspecified whether complicated       predniSONE 20 MG tablet    DELTASONE    20 tablet    Take 3 tabs (60 mg) by mouth daily x 3 days, 2 tabs (40 mg) daily x 3 days, 1 tab (20 mg) daily x 3 days, then 1/2 tab (10 mg) x 3 days.    Mild asthma with exacerbation, unspecified whether persistent       VIOS AEROSOL DELIVERY SYSTEM Misc      See Admin Instructions        * Notice:  This list has 2 medication(s) that are the same as other medications prescribed for you. Read the directions carefully, and ask your doctor or other care provider to review them with you.

## 2018-06-15 NOTE — LETTER
[address] [address]  Phone: [phone]    Pilar 15, 2018        Yakov Cantrell  [address] [place] [address]          To whom it may concern:    RE: Yakov Sandych    Yakov was seen in our clinic 6/15/2018, please excuse him from work Monday 6/11/2018 and today, Friday 6/15/2018    Please contact me for questions or concerns.      Sincerely,        Dalia Stark MD

## 2018-06-15 NOTE — PROGRESS NOTES
SUBJECTIVE:   Yakov Cantrell is a 27 year old male who presents to clinic today for the following health issues:      History of Present Illness     Asthma:     Cough::  No    Wheezing::  YES    Dyspnea::  YES    Use of rescue inhaler::  Other    Taking Asthma medication as prescribed::  NO    Asthma triggers::  Unaware of any triggers, Dust mites, Gastric reflux, Humidity, Mold, Pollens and Upper respiratory infections    ER/UC Visits or Admissions::  Yes    Frequency::  1    Diet:  Regular (no restrictions)  Frequency of exercise:  None  Taking medications regularly:  Yes  Medication side effects:  None  Additional concerns today:  YES    Pt has been worsening symptoms that started 3 weeks ago, and it is not going away, he has been getting multiple attacks, and even between attacks he does not feel good.    Problem list and histories reviewed & adjusted, as indicated.  Additional history: as documented        Patient Active Problem List   Diagnosis     CARDIOVASCULAR SCREENING; LDL GOAL LESS THAN 160     Herpes zoster without complication     History reviewed. No pertinent surgical history.    Social History   Substance Use Topics     Smoking status: Current Some Day Smoker     Smokeless tobacco: Never Used      Comment: bum cigs socially      Alcohol use 0.0 oz/week     0 Standard drinks or equivalent per week      Comment: rarely     Family History   Problem Relation Age of Onset     Abdominal Aortic Aneurysm Maternal Grandfather          Current Outpatient Prescriptions   Medication Sig Dispense Refill     albuterol (2.5 MG/3ML) 0.083% neb solution INHALE 1 VIAL VIA NEB Q 4 - 6 H PRF WHEEZING.  1     albuterol (PROAIR HFA) 108 (90 BASE) MCG/ACT Inhaler Inhale 2 puffs into the lungs every 6 hours 1 Inhaler 1     fluticasone (FLOVENT HFA) 110 MCG/ACT Inhaler Inhale 1 puff into the lungs 2 times daily 3 Inhaler 1     Nebulizers (VIOS AEROSOL DELIVERY SYSTEM) MISC See Admin Instructions  0     predniSONE  (DELTASONE) 20 MG tablet Take 3 tabs (60 mg) by mouth daily x 3 days, 2 tabs (40 mg) daily x 3 days, 1 tab (20 mg) daily x 3 days, then 1/2 tab (10 mg) x 3 days. 20 tablet 0     [DISCONTINUED] albuterol (PROAIR HFA, PROVENTIL HFA, VENTOLIN HFA) 108 (90 BASE) MCG/ACT inhaler Inhale 2 puffs into the lungs every 6 hours as needed for shortness of breath / dyspnea or wheezing 1 Inhaler 1       ROS:  CONSTITUTIONAL: NEGATIVE for fever, chills, change in weight  CV: NEGATIVE for chest pain, palpitations or peripheral edema    OBJECTIVE:     /89 (BP Location: Right arm, Patient Position: Chair, Cuff Size: Adult Large)  Pulse 106  Temp 97.5  F (36.4  C) (Oral)  Resp 16  Wt 293 lb (132.9 kg)  BMI 37.12 kg/m2  Body mass index is 37.12 kg/(m^2).  GENERAL: healthy, alert and no distress  HENT: ear canals and TM's normal, nose and mouth without ulcers or lesions  RESP: decreased breath sounds throughout  CV: regular rate and rhythm, normal S1 S2, no S3 or S4, no murmur, click or rub, no peripheral edema and peripheral pulses strong        ASSESSMENT/PLAN:             1. Screening for HIV (human immunodeficiency virus)      2. Mild persistent asthma, unspecified whether complicated, not controlled at this time, start on   Start on   - fluticasone (FLOVENT HFA) 110 MCG/ACT Inhaler; Inhale 1 puff into the lungs 2 times daily  Dispense: 3 Inhaler; Refill: 1    3. Mild asthma with exacerbation, unspecified whether persistent  Start on   - predniSONE (DELTASONE) 20 MG tablet; Take 3 tabs (60 mg) by mouth daily x 3 days, 2 tabs (40 mg) daily x 3 days, 1 tab (20 mg) daily x 3 days, then 1/2 tab (10 mg) x 3 days.  Dispense: 20 tablet; Refill: 0    Follow up in 1 month.   Sooner if symptoms fail to improve.    Dalia Stark MD  [facility]  Answers for HPI/ROS submitted by the patient on 6/15/2018   PHQ-2 Score: 1

## 2018-06-16 ENCOUNTER — TELEPHONE (OUTPATIENT)
Dept: NURSING | Facility: CLINIC | Age: 28
End: 2018-06-16

## 2018-06-16 ENCOUNTER — NURSE TRIAGE (OUTPATIENT)
Dept: NURSING | Facility: CLINIC | Age: 28
End: 2018-06-16

## 2018-06-16 ENCOUNTER — TELEPHONE (OUTPATIENT)
Dept: FAMILY MEDICINE | Facility: CLINIC | Age: 28
End: 2018-06-16

## 2018-06-16 DIAGNOSIS — J45.909 MILD ASTHMA: Primary | ICD-10-CM

## 2018-06-16 NOTE — TELEPHONE ENCOUNTER
Yakov seen in clinic yesterday (6/15/18), diagnosed with mild asthma and prescribed Flovent and Prednisone.  Yakov reports Flovent not covered by insurance.  This nurse spoke with pharmacist, states both Asmanex and Q Vandana are covered, but not equivalent to Flovent, as all have different steroids in them.  Paged on call provider Dr. Langston to [facility] at 1:17 pm for verbal order.  Verbal order for Q vandana received.  Okay to order dosge/ puffs per pharmacist for most equivalent.  Per pharm, most equivalent: 80 mcg 1 puff BID for Q vandana.  Patient updated on new script.      Reason for Disposition    Caller has URGENT medication question about med that PCP prescribed and triager unable to answer question    Protocols used: MEDICATION QUESTION CALL-ADULT-

## 2018-06-16 NOTE — TELEPHONE ENCOUNTER
Clinic Action Needed:FYI  FNA Triage Call  Presenting Problem:    [facility] pharmacy calling back, see previous triage calls from today.   Flovent is not covered, prescription was changed to Qvar.  Now Qvar redihaler is not covered, classic Qvar would be covered but pharmacy does not carry this.    Pharmacist is requesting new prescription for   Asmanex inhaler, twist inhaler, 220 micrograms once daily in the evening.  This would be the starting dose for steroids.     Patient's primary provider is Dr Stark at the [facility]. Dr. Iwona Langston is on call for this clinic and was paged at 2:41pm via [employer] to call this RN directly.   Dr. Langston was repaged at 2:52pm via the page .  Dr. Langston called immediately back. Advised that Asmanex can be ordered how the pharmacy recommends.  Per pharmacy recommendation:   Asmanex inhaler, twist inhaler, 220 micrograms once daily in the evening.     This was called back to niru Deleon.      Routed to:Dr Stark/nurse sulaiman Reed RN/ [facility]

## 2018-06-16 NOTE — TELEPHONE ENCOUNTER
See triage call, new order for Q lakisha received, per pharmacist, 80 mcg 1 puff BID most equivalent to previously ordered Flovent.  Patient updated.    Carlie Art RN  FNA

## 2018-06-16 NOTE — TELEPHONE ENCOUNTER
[facility] pharmacy calling back, see previous triage calls from today.   Flovent is not covered, prescription was changed to Qvar.  Now Qvar redihaler is not covered, classic Qvar would be covered but pharmacy does not carry this.    Pharmacist is requesting new prescription for   Asmanex inhaler, twist inhaler, 220 micrograms once daily in the evening.  This would be the starting dose for steroids.     Patient's primary provider is Dr Stark at the [facility]. Dr. Iwona Langston is on call for this clinic and was paged at 2:41pm via [employer] to call this RN directly.   Dr. Langston was repaged at 2:52pm via the page .  Dr. Langston called immediately back. Advised that Asmanex can be ordered how the pharmacy recommends.  Per pharmacy recommendation:   Asmanex inhaler, twist inhaler, 220 micrograms once daily in the evening.     This was called back to niru Deleon.  Will send a message to the clinic as an FYI.    Reason for Disposition    Pharmacy calling with prescription questions and triager unable to answer question    Protocols used: MEDICATION QUESTION CALL-ADULT-

## 2018-06-18 ENCOUNTER — TELEPHONE (OUTPATIENT)
Dept: FAMILY MEDICINE | Facility: CLINIC | Age: 28
End: 2018-06-18

## 2018-06-18 NOTE — TELEPHONE ENCOUNTER
It says he had ACT this month if you look at flowsheets and yet on  it says he fails.   Not sure what is going on.  The fail is not the score but it says on the review that his ACT is missing.   Not sure what is going on

## 2018-06-19 PROBLEM — J45.30 MILD PERSISTENT ASTHMA, UNSPECIFIED WHETHER COMPLICATED: Status: ACTIVE | Noted: 2018-06-19

## 2018-06-19 ASSESSMENT — ASTHMA QUESTIONNAIRES: ACT_TOTALSCORE: 15

## 2018-06-28 ENCOUNTER — TELEPHONE (OUTPATIENT)
Dept: FAMILY MEDICINE | Facility: CLINIC | Age: 28
End: 2018-06-28

## 2018-06-28 NOTE — TELEPHONE ENCOUNTER
Reason for call:  Form   Our goal is to have forms completed within 72 hours, however some forms may require a visit or additional information.     Who is the form from? Insurance comp  Where did the form come from? Patient or family brought in     What clinic location was the form placed at? [facility]  Where was the form placed? 's Box  What number is listed as a contact on the form? [phone]    Phone call message - patient request for a letter, form or note:     Date needed: July 3, 2018  Please mail to [employer]  Has the patient signed a consent form for release of information? YES    Additional comments: Please have [facility]. Stark [facility].   Once completed, please mail to [employer]    Type of letter, form or note: [facility]    Phone number to reach patient:  Cell number on file:    Telephone Information:   Mobile [phone]       Best Time:  Anytime    Can we leave a detailed message on this number?  YES

## 2018-06-28 NOTE — TELEPHONE ENCOUNTER
Per Dr Stark, appointment needed to completed FMLA forms.  Patient scheduled phone visit for 6/29/18.    Domenica Garcia

## 2018-06-29 ENCOUNTER — VIRTUAL VISIT (OUTPATIENT)
Dept: FAMILY MEDICINE | Facility: CLINIC | Age: 28
End: 2018-06-29
Payer: COMMERCIAL

## 2018-06-29 DIAGNOSIS — J45.30 MILD PERSISTENT ASTHMA, UNSPECIFIED WHETHER COMPLICATED: Primary | ICD-10-CM

## 2018-06-29 PROCEDURE — 99441 ZZC PHYSICIAN TELEPHONE EVALUATION 5-10 MIN: CPT | Performed by: FAMILY MEDICINE

## 2018-06-29 NOTE — MR AVS SNAPSHOT
After Visit Summary   6/29/2018    Yakov Cantrell    MRN: [id number]           Patient Information     Date Of Birth          1990        Visit Information        Provider Department      6/29/2018 10:30 AM Dalia Stark MD [facility]        Today's Diagnoses     Mild persistent asthma, unspecified whether complicated    -  1       Follow-ups after your visit        Your next 10 appointments already scheduled     Jul 19, 2018  5:30 PM CDT   SHORT with Dalia Stark MD   [facility] ([facility])    [address] [place] [address] [phone]              Who to contact     If you have questions or need follow up information about today's clinic visit or your schedule please contact [facility] directly at [phone].  Normal or non-critical lab and imaging results will be communicated to you by MyChart, letter or phone within 4 business days after the clinic has received the results. If you do not hear from us within 7 days, please contact the clinic through MyChart or phone. If you have a critical or abnormal lab result, we will notify you by phone as soon as possible.  Submit refill requests through [employer] or call your pharmacy and they will forward the refill request to us. Please allow 3 business days for your refill to be completed.          Additional Information About Your Visit        Care EveryWhere ID     This is your Care EveryWhere ID. This could be used by other organizations to access your [facility] medical records  [id number]         Blood Pressure from Last 3 Encounters:   06/15/18 136/89   03/20/18 130/84   01/21/18 132/78    Weight from Last 3 Encounters:   06/15/18 293 lb (132.9 kg)   03/20/18 284 lb (128.8 kg)   01/21/18 276 lb (125.2 kg)              Today, you had the following     No orders found for display       Primary Care Provider Fax #    Physician No Ref-Primary [phone]        No address on file        Equal Access to Services     MAIKROSALEE SCOOTER : Hadii aad ku hadaide Lee, walorida luqtevinha, qamarky kareynahattie eller, waxwade panchito emery. So [facility] [phone].    ATENCIÓN: Si habla español, tiene a hayes disposición servicios gratuitos de asistencia lingüística. [facility] [phone].    We comply with applicable federal civil rights laws and [place] laws. We do not discriminate on the basis of race, color, national origin, age, disability, sex, sexual orientation, or gender identity.            Thank you!     Thank you for choosing [facility]  for your care. Our goal is always to provide you with excellent care. Hearing back from our patients is one way we can continue to improve our services. Please take a few minutes to complete the written survey that you may receive in the mail after your visit with us. Thank you!             Your Updated Medication List - Protect others around you: Learn how to safely use, store and throw away your medicines at www.disposemymeds.org.          This list is accurate as of 6/29/18 11:29 AM.  Always use your most recent med list.                   Brand Name Dispense Instructions for use Diagnosis    albuterol (2.5 MG/3ML) 0.083% neb solution      INHALE 1 VIAL VIA NEB Q 4 - 6 H PRF WHEEZING.        beclomethasone 80 MCG/ACT Inhaler    QVAR    3 Inhaler    Inhale 1 puff into the lungs 2 times daily    Mild asthma       VIOS AEROSOL DELIVERY SYSTEM Misc      See Admin Instructions

## 2018-06-29 NOTE — PROGRESS NOTES
"Yakov Cantrell is a 27 year old male who is being evaluated via a telephone visit.      The patient has been notified of following:     \"This telephone visit will be conducted via a call between you and your physician/provider. We have found that certain health care needs can be provided without the need for a physical exam.  This service lets us provide the care you need with a short phone conversation.  If a prescription is necessary we can send it directly to your pharmacy.  If lab work is needed we can place an order for that and you can then stop by our lab to have the test done at a later time.    We will bill your insurance company for this service.  Please check with your medical insurance if this type of visit is covered. You may be responsible for the cost of this type of visit if insurance coverage is denied.  The typical cost is $30 (10min), $59 (11-20min) and $85 (21-30min).  Most often these visits are shorter than 10 minutes.    If during the course of the call the physician/provider feels a telephone visit is not appropriate, you will not be charged for this service.\"       Consent has been obtained for this service by care team member: yes.   See the scanned image in the medical record.    Yakov Cantrell complains of  No chief complaint on file.      I have reviewed and updated the patient's Past Medical History, Social History, Family History and Medication List.    ALLERGIES  Amoxicillin    [facility]   (MA signature)    Additional provider notes: follow up on his asthma, improved significantly since starting on meds and qvar, now he uses albuterol inhaler once every other day.  Also would like to fill his FMLA form.    Assessment/Plan:  (J45.30) Mild persistent asthma, unspecified whether complicated  (primary encounter diagnosis)  Comment: improved  Plan: FMLA form filled.        I have reviewed the note as documented above.  This accurately captures the substance of my conversation with " the patient,      Total time of call between patient and provider was 6 minutes

## 2018-07-06 NOTE — TELEPHONE ENCOUNTER
Pt states [employer] never received. Said forms must have been sent to [employer] to be scanned into chart. Will try back next week to see if it has been scanned  Rosalie Lake/

## 2018-08-14 ENCOUNTER — TELEPHONE (OUTPATIENT)
Dept: FAMILY MEDICINE | Facility: CLINIC | Age: 28
End: 2018-08-14

## 2018-08-14 NOTE — TELEPHONE ENCOUNTER
Can we mail him ACT  please?  Dalia Stark MD  [facility]  [phone]

## 2018-08-14 NOTE — LETTER
[facility]  [address] [place] [id number] [phone]  August 14, 2018    Yakov Cantrell  [address] [place] [id number]    Dear Yakov,    I care about your health and have reviewed your health plan. I have reviewed your medical conditions, medication list, and lab results and am making recommendations based on this review, to better manage your health.    You are in particular need of attention regarding:  -Asthma    I am recommending that you:   -Complete and return the attached ASTHMA CONTROL TEST.  If your total score is 19 or less or you have been to the ER or urgent care for your asthma, then please schedule an asthma followup appointment.      Please call us at [phone] (or use [employer]) to address the above recommendations.     Thank you for trusting [facility] and we appreciate the opportunity to serve you.  We look forward to supporting your healthcare needs in the future.    Healthy Regards,    Dalia Stark MD

## 2018-08-14 NOTE — TELEPHONE ENCOUNTER
Panel Management Review      Patient has the following on his problem list:     Asthma review     ACT Total Scores 6/18/2018   ACT TOTAL SCORE (Goal Greater than or Equal to 20) 15   In the past 12 months, how many times did you visit the emergency room for your asthma without being admitted to the hospital? 0   In the past 12 months, how many times were you hospitalized overnight because of your asthma? 0      1. Is Asthma diagnosis on the Problem List? Yes    2. Is Asthma listed on Health Maintenance? Yes    3. Patient is due for:  ACT      Composite cancer screening  Chart review shows that this patient is due/due soon for the following None  Summary:    Patient is due/failing the following:   ACT    Action needed:   Routed to provider for review.    Type of outreach:    None, routed to provider for review.    Questions for provider review:    Pt had a phone visit on 6/15/2018 and had a low ACT score. When would you like him to follow up or should we just mail him a copy of the ACT to fill out and return                                                                                                                                    Shavon Zaragoza CMA       Chart routed to Provider .

## 2018-08-14 NOTE — TELEPHONE ENCOUNTER
Type of outreach:  Sent letter. Also sent an ACT with return envelope.     Carmencita Steele, VIRGIE on 8/14/2018 at 2:27 PM

## 2018-11-03 ENCOUNTER — OFFICE VISIT (OUTPATIENT)
Dept: FAMILY MEDICINE | Facility: CLINIC | Age: 28
End: 2018-11-03
Payer: COMMERCIAL

## 2018-11-03 VITALS
SYSTOLIC BLOOD PRESSURE: 128 MMHG | TEMPERATURE: 97.8 F | DIASTOLIC BLOOD PRESSURE: 84 MMHG | OXYGEN SATURATION: 98 % | RESPIRATION RATE: 16 BRPM | HEART RATE: 104 BPM | HEIGHT: 75 IN | WEIGHT: 288 LBS | BODY MASS INDEX: 35.81 KG/M2

## 2018-11-03 DIAGNOSIS — L20.9 ATOPIC DERMATITIS, UNSPECIFIED TYPE: ICD-10-CM

## 2018-11-03 DIAGNOSIS — J45.30 MILD PERSISTENT ASTHMA, UNSPECIFIED WHETHER COMPLICATED: ICD-10-CM

## 2018-11-03 DIAGNOSIS — Z71.1 FEARED COMPLAINT WITHOUT DIAGNOSIS: Primary | ICD-10-CM

## 2018-11-03 PROCEDURE — 99214 OFFICE O/P EST MOD 30 MIN: CPT | Performed by: NURSE PRACTITIONER

## 2018-11-03 NOTE — MR AVS SNAPSHOT
"              After Visit Summary   11/3/2018    Yakov Cantrell    MRN: [id number]           Patient Information     Date Of Birth          1990        Visit Information        Provider Department      11/3/2018 8:40 AM Allison Ramirez APRN CNP [facility]         Follow-ups after your visit        Who to contact     If you have questions or need follow up information about today's clinic visit or your schedule please contact [facility] directly at [phone].  Normal or non-critical lab and imaging results will be communicated to you by MyChart, letter or phone within 4 business days after the clinic has received the results. If you do not hear from us within 7 days, please contact the clinic through [employer] or phone. If you have a critical or abnormal lab result, we will notify you by phone as soon as possible.  Submit refill requests through [employer] or call your pharmacy and they will forward the refill request to us. Please allow 3 business days for your refill to be completed.          Additional Information About Your Visit        [facility] Information     [employer] lets you send messages to your doctor, view your test results, renew your prescriptions, schedule appointments and more. To sign up, go to [email or web address] . Click on \"Log in\" on the left side of the screen, which will take you to the Welcome page. Then click on \"Sign up Now\" on the right side of the page.     You will be asked to enter the access code listed below, as well as some personal information. Please follow the directions to create your username and password.     Your access code is: [email or web address]  Expires: 2019  9:10 AM     Your access code will  in 90 days. If you need help or a new code, please call your [facility] or [phone].        Care EveryWhere ID     This is your Care EveryWhere ID. This could be used by other organizations to access your [place] medical " "records  [id number]        Your Vitals Were     Pulse Temperature Respirations Height Pulse Oximetry BMI (Body Mass Index)    104 97.8  F (36.6  C) (Oral) 16 6' 2.5\" (1.892 m) 98% 36.48 kg/m2       Blood Pressure from Last 3 Encounters:   11/03/18 128/84   06/15/18 136/89   03/20/18 130/84    Weight from Last 3 Encounters:   11/03/18 288 lb (130.6 kg)   06/15/18 293 lb (132.9 kg)   03/20/18 284 lb (128.8 kg)              Today, you had the following     No orders found for display       Primary Care Provider Fax #    Physician No Ref-Primary [phone]       No address on file        Equal Access to Services     FRANKI HELMS : Lauren Lee, waaxhattie luqadaha, qaybta kaalmada daphnie, hattie lopez . So [facility] [phone].    ATENCIÓN: Si habla español, tiene a hayes disposición servicios gratuitos de asistencia lingüística. Llame al [phone].    We comply with applicable federal civil rights laws and [place] laws. We do not discriminate on the basis of race, color, national origin, age, disability, sex, sexual orientation, or gender identity.            Thank you!     Thank you for choosing [facility]  for your care. Our goal is always to provide you with excellent care. Hearing back from our patients is one way we can continue to improve our services. Please take a few minutes to complete the written survey that you may receive in the mail after your visit with us. Thank you!             Your Updated Medication List - Protect others around you: Learn how to safely use, store and throw away your medicines at www.disposemymeds.org.          This list is accurate as of 11/3/18  9:10 AM.  Always use your most recent med list.                   Brand Name Dispense Instructions for use Diagnosis    albuterol (2.5 MG/3ML) 0.083% neb solution      INHALE 1 VIAL VIA NEB Q 4 - 6 H PRF WHEEZING.        beclomethasone 80 MCG/ACT Aers IS A DISCONTINUED " MEDICATION    QVAR    3 Inhaler    Inhale 1 puff into the lungs 2 times daily    Mild asthma       VIOS AEROSOL DELIVERY SYSTEM Misc      See Admin Instructions

## 2018-11-03 NOTE — PROGRESS NOTES
HPI    SUBJECTIVE:   Yakov Cantrell is a 28 year old male who presents to clinic today for the following health issues:    Eye(s) Problem      Duration: 2 months    Description:  Location: bilateral  Pain: no   Redness: no   Discharge: no     Accompanying signs and symptoms: elevated BP at pharmacy, blurry vision    History (Trauma, foreign body exposure,): None    Precipitating or alleviating factors (contact use): None    Therapies tried and outcome: eye drops    Wakes up in the morning and feels like vision is hazy for 5-10 seconds.  Resolves on his own.  Also notices this when he rubs his eyes.  He was seen by opthalmology for this.  Thought to be due to his contacts, dry eyes.  Not wearing contacts right now.  Was started on lubricating drops.  At the pharmacy his BP was 152/105.  Mom wanted him to come and be seen for his BP.        BP Readings from Last 6 Encounters:   11/03/18 128/84   06/15/18 136/89   03/20/18 130/84   01/21/18 132/78   08/08/17 130/78   07/19/17 131/83       Problem list and histories reviewed & adjusted, as indicated.  Additional history: as documented    Current Outpatient Prescriptions   Medication Sig Dispense Refill     albuterol (2.5 MG/3ML) 0.083% neb solution INHALE 1 VIAL VIA NEB Q 4 - 6 H PRF WHEEZING.  1     beclomethasone (QVAR) 80 MCG/ACT Inhaler Inhale 1 puff into the lungs 2 times daily 3 Inhaler 1     Nebulizers (VIOS AEROSOL DELIVERY SYSTEM) MISC See Admin Instructions  0     Allergies   Allergen Reactions     Amoxicillin        Reviewed and updated as needed this visit by clinical staff  Tobacco  Allergies  Meds  Med Hx  Surg Hx  Fam Hx  Soc Hx      Reviewed and updated as needed this visit by Provider         ROS:  Constitutional, HEENT, cardiovascular, pulmonary, gi and gu systems are negative, except as otherwise noted.    OBJECTIVE:     /84 (BP Location: Right arm, Patient Position: Chair, Cuff Size: Adult Large)  Pulse 104  Temp 97.8  F (36.6  C)  "(Oral)  Resp 16  Ht 6' 2.5\" (1.892 m)  Wt 288 lb (130.6 kg)  SpO2 98%  BMI 36.48 kg/m2  Body mass index is 36.48 kg/(m^2).  GENERAL: healthy, alert and no distress  EYES: Eyes grossly normal to inspection, PERRL and conjunctivae and sclerae normal  NECK: no adenopathy, no asymmetry, masses, or scars and thyroid normal to palpation  RESP: lungs clear to auscultation - no rales, rhonchi or wheezes  CV: regular rate and rhythm, normal S1 S2, no S3 or S4, no murmur, click or rub, no peripheral edema   SKIN: bilat anterior lower extremities with dry erythematous excoriated rash     Diagnostic Test Results:  none     ASSESSMENT/PLAN:   1. Feared complaint without diagnosis  BP controlled here today and has been stable over the last year. Can continue to check periodically at the pharmacy.  Reassured.      2. Mild persistent asthma, unspecified whether complicated  ACT = 20 today.  He reports his asthma feels well controlled.      3. Atopic dermatitis, unspecified type  Hx of eczema that is managed by derm. Has steroid cream at home, but has not been using.  Okay to apply.  Dicussed emollients.           ARTHUR [facility]      Physical Exam      "

## 2018-11-04 ASSESSMENT — ASTHMA QUESTIONNAIRES: ACT_TOTALSCORE: 20

## 2019-01-28 ENCOUNTER — OFFICE VISIT (OUTPATIENT)
Dept: FAMILY MEDICINE | Facility: CLINIC | Age: 29
End: 2019-01-28
Payer: COMMERCIAL

## 2019-01-28 VITALS
BODY MASS INDEX: 37.6 KG/M2 | HEART RATE: 77 BPM | DIASTOLIC BLOOD PRESSURE: 96 MMHG | SYSTOLIC BLOOD PRESSURE: 144 MMHG | OXYGEN SATURATION: 97 % | RESPIRATION RATE: 16 BRPM | TEMPERATURE: 98.1 F | WEIGHT: 293 LBS | HEIGHT: 74 IN

## 2019-01-28 DIAGNOSIS — H61.23 EXCESSIVE CERUMEN IN BOTH EAR CANALS: Primary | ICD-10-CM

## 2019-01-28 PROCEDURE — 69209 REMOVE IMPACTED EAR WAX UNI: CPT | Performed by: PHYSICIAN ASSISTANT

## 2019-01-28 RX ORDER — CYCLOBENZAPRINE HCL 5 MG
TABLET ORAL
Refills: 0 | COMMUNITY
Start: 2018-12-31 | End: 2020-11-06

## 2019-01-28 ASSESSMENT — MIFFLIN-ST. JEOR: SCORE: 2363.79

## 2019-01-29 NOTE — PATIENT INSTRUCTIONS
Patient Education     Impacted Earwax     Inner ear structures including ear canal and eardrum.     Impacted earwax is a buildup of the natural wax in the ear (cerumen). Impacted earwax is very common. It can cause symptoms such as hearing loss. It can also make it difficult for a doctor to examine your ear.  Understanding earwax  Tiny glands in your ear make substances that combine with dead skin cells to form earwax. Earwax helps protect your ear canal from water, dirt, infection, and injury. Over time, earwax travels from the inner part of your ear canal to the entrance of the canal. Then it falls away naturally. But in some cases, it can t travel to the entrance of the canal. This may be because of a health condition or objects put in the ear. With age, earwax tends to become harder and less fluid. Older adults are more likely to have problems with earwax buildup.  What causes impacted earwax?  Earwax can build up because of many health conditions. Some cause a physical blockage. Others cause too much earwax to be made. Health conditions that can cause earwax buildup include:    Use of cotton swabs to clean deep in the ear canal    Bony blockage in the ear (osteoma or exostoses)    Infections, such as  infection of the outer ear (external otitis)    Skin disease, such as eczema    Autoimmune diseases, such as lupus    A narrowed ear canal from birth, chronic inflammation, or injury    Too much earwax because of injury    Too much earwax because of  water in the ear canal  Objects repeatedly placed in the ear can also cause impacted earwax. For example, putting cotton swabs in the ear may push the wax deeper into the ear. Over time, this may cause blockage. Hearing aids, swimming plugs, and swim molds can cause the same problem when used again and again.  In some cases, the cause of impacted earwax is not known.  Symptoms of impacted earwax  Excess earwax usually does not cause any symptoms, unless there is a  large amount of buildup. Then it may cause symptoms such as:    Hearing loss    Earache    Sense of ear fullness    Itching in the ear    Odor from the ear    Ear drainage    Dizziness    Ringing in the ears    Cough  Treatment for impacted earwax  If you don t have symptoms, you may not need treatment. Often, the earwax goes away on its own with time. If you have symptoms, you may have one or more treatments such as:    Eardrops to soften the earwax. This helps it leave the ear over time.    Rinsing (irrigation) of the ear canal with water. This is done in a doctor s office.    Removal of the earwax with small tools. This is also done in a doctor s office.  In rare cases, some treatments for earwax removal may cause complications such as:    Infection of the outer ear (otitis external)    Earache    Short-term hearing loss    Dizziness    Water trapped in the ear canal    Hole in the eardrum    Ringing in the ears    Bleeding from the ear  Talk with your healthcare provider about which risks apply most to you.  Don t use these at home  Healthcare providers do not advise use of ear candles or ear vacuum kits. These methods are not shown to work and may cause problems.   Preventing impacted earwax  You may not be able to prevent impacted earwax if you have a health condition that causes it, such as eczema. In other cases, you may be able to prevent earwax buildup by:    Using ear drops once a week    Having routine cleaning of the ear about every 6 months    Not using cotton swabs in the ear  When to call the healthcare provider  Call your healthcare provider if you have symptoms of impacted earwax. Also call right away if you have severe symptoms after earwax removal. These may include bleeding or severe ear pain.   Date Last Reviewed: 5/1/2017 2000-2018 [employer]. [address] [place] [address], [place] [address]. All rights reserved. This information is not intended as a substitute for professional  medical care. Always follow your healthcare professional's instructions.

## 2019-01-29 NOTE — PROGRESS NOTES
"  SUBJECTIVE:   Yakov Cantrell is a 28 year old male who presents to clinic today for the following health issues:      Patient is here to get ear cleaning on left side. Decreased hearing on left side for 2 days. Feels plugged. Denies pain.     He has tried hydrogen peroxide and ear wax softening drops without improvement.       Problem list and histories reviewed & adjusted, as indicated.  Additional history: as documented    Patient Active Problem List   Diagnosis     CARDIOVASCULAR SCREENING; LDL GOAL LESS THAN 160     Herpes zoster without complication     Mild persistent asthma, unspecified whether complicated     History reviewed. No pertinent surgical history.    Social History     Tobacco Use     Smoking status: Current Some Day Smoker     Smokeless tobacco: Never Used     Tobacco comment: bum cigs socially    Substance Use Topics     Alcohol use: Yes     Alcohol/week: 0.0 oz     Comment: rarely     Family History   Problem Relation Age of Onset     Abdominal Aortic Aneurysm Maternal Grandfather          Current Outpatient Medications   Medication Sig Dispense Refill     albuterol (2.5 MG/3ML) 0.083% neb solution INHALE 1 VIAL VIA NEB Q 4 - 6 H PRF WHEEZING.  1     beclomethasone (QVAR) 80 MCG/ACT Inhaler Inhale 1 puff into the lungs 2 times daily 3 Inhaler 1     cyclobenzaprine (FLEXERIL) 5 MG tablet TK 1 T PO Q 8 H PRF MUSCLE SPASMS.  0     Nebulizers (VIOS AEROSOL DELIVERY SYSTEM) MISC See Admin Instructions  0     Allergies   Allergen Reactions     Amoxicillin        Reviewed and updated as needed this visit by clinical staff       Reviewed and updated as needed this visit by Provider         ROS:  Constitutional, HEENT, cardiovascular, pulmonary, gi and gu systems are negative, except as otherwise noted.    OBJECTIVE:     BP (!) 144/96 (BP Location: Right arm, Patient Position: Chair, Cuff Size: Infant)   Pulse 77   Temp 98.1  F (36.7  C) (Oral)   Resp 16   Ht 1.88 m (6' 2\")   Wt 132.9 kg (293 lb)  "  SpO2 97%   BMI 37.62 kg/m    Body mass index is 37.62 kg/m .  GENERAL: healthy, alert and no distress  EYES: Eyes grossly normal to inspection, PERRL and conjunctivae and sclerae normal  HENT: normal cephalic/atraumatic, both ears: occluded with wax, nose and mouth without ulcers or lesions, oropharynx clear and oral mucous membranes moist  MS: no gross musculoskeletal defects noted, no edema  SKIN: no suspicious lesions or rashes  NEURO: Normal strength and tone, mentation intact and speech normal  PSYCH: mentation appears normal, affect normal/bright    Diagnostic Test Results:  none     ASSESSMENT/PLAN:       (H61.23) Excessive cerumen in both ear canals  (primary encounter diagnosis)    Comment: Bilateral ears are clear after lavage by MA and patient notes improvement of hearing.    Plan: HC REMOVAL IMPACTED CERUMEN IRRIGATION/LVG         UNILAT              Patient Instructions       Patient Education     Impacted Earwax     Inner ear structures including ear canal and eardrum.     Impacted earwax is a buildup of the natural wax in the ear (cerumen). Impacted earwax is very common. It can cause symptoms such as hearing loss. It can also make it difficult for a doctor to examine your ear.  Understanding earwax  Tiny glands in your ear make substances that combine with dead skin cells to form earwax. Earwax helps protect your ear canal from water, dirt, infection, and injury. Over time, earwax travels from the inner part of your ear canal to the entrance of the canal. Then it falls away naturally. But in some cases, it can t travel to the entrance of the canal. This may be because of a health condition or objects put in the ear. With age, earwax tends to become harder and less fluid. Older adults are more likely to have problems with earwax buildup.  What causes impacted earwax?  Earwax can build up because of many health conditions. Some cause a physical blockage. Others cause too much earwax to be made.  Health conditions that can cause earwax buildup include:    Use of cotton swabs to clean deep in the ear canal    Bony blockage in the ear (osteoma or exostoses)    Infections, such as  infection of the outer ear (external otitis)    Skin disease, such as eczema    Autoimmune diseases, such as lupus    A narrowed ear canal from birth, chronic inflammation, or injury    Too much earwax because of injury    Too much earwax because of  water in the ear canal  Objects repeatedly placed in the ear can also cause impacted earwax. For example, putting cotton swabs in the ear may push the wax deeper into the ear. Over time, this may cause blockage. Hearing aids, swimming plugs, and swim molds can cause the same problem when used again and again.  In some cases, the cause of impacted earwax is not known.  Symptoms of impacted earwax  Excess earwax usually does not cause any symptoms, unless there is a large amount of buildup. Then it may cause symptoms such as:    Hearing loss    Earache    Sense of ear fullness    Itching in the ear    Odor from the ear    Ear drainage    Dizziness    Ringing in the ears    Cough  Treatment for impacted earwax  If you don t have symptoms, you may not need treatment. Often, the earwax goes away on its own with time. If you have symptoms, you may have one or more treatments such as:    Eardrops to soften the earwax. This helps it leave the ear over time.    Rinsing (irrigation) of the ear canal with water. This is done in a doctor s office.    Removal of the earwax with small tools. This is also done in a doctor s office.  In rare cases, some treatments for earwax removal may cause complications such as:    Infection of the outer ear (otitis external)    Earache    Short-term hearing loss    Dizziness    Water trapped in the ear canal    Hole in the eardrum    Ringing in the ears    Bleeding from the ear  Talk with your healthcare provider about which risks apply most to you.  Don t use these  at home  Healthcare providers do not advise use of ear candles or ear vacuum kits. These methods are not shown to work and may cause problems.   Preventing impacted earwax  You may not be able to prevent impacted earwax if you have a health condition that causes it, such as eczema. In other cases, you may be able to prevent earwax buildup by:    Using ear drops once a week    Having routine cleaning of the ear about every 6 months    Not using cotton swabs in the ear  When to call the healthcare provider  Call your healthcare provider if you have symptoms of impacted earwax. Also call right away if you have severe symptoms after earwax removal. These may include bleeding or severe ear pain.   Date Last Reviewed: 5/1/2017 2000-2018 [employer]. [address] [place] [address], [place] [address]. All rights reserved. This information is not intended as a substitute for professional medical care. Always follow your healthcare professional's instructions.               Phuc Mendez PA-C  [facility]

## 2019-02-14 ENCOUNTER — TELEPHONE (OUTPATIENT)
Dept: FAMILY MEDICINE | Facility: CLINIC | Age: 29
End: 2019-02-14

## 2019-02-15 ENCOUNTER — OFFICE VISIT (OUTPATIENT)
Dept: URGENT CARE | Facility: URGENT CARE | Age: 29
End: 2019-02-15
Payer: COMMERCIAL

## 2019-02-15 VITALS
RESPIRATION RATE: 20 BRPM | HEART RATE: 71 BPM | SYSTOLIC BLOOD PRESSURE: 128 MMHG | TEMPERATURE: 97.3 F | OXYGEN SATURATION: 97 % | DIASTOLIC BLOOD PRESSURE: 82 MMHG

## 2019-02-15 DIAGNOSIS — J45.21 MILD INTERMITTENT ASTHMA WITH EXACERBATION: Primary | ICD-10-CM

## 2019-02-15 PROCEDURE — 99214 OFFICE O/P EST MOD 30 MIN: CPT | Performed by: FAMILY MEDICINE

## 2019-02-15 RX ORDER — PREDNISONE 20 MG/1
TABLET ORAL
Qty: 21 TABLET | Refills: 0 | Status: SHIPPED | OUTPATIENT
Start: 2019-02-15 | End: 2020-04-10

## 2019-02-15 RX ORDER — INHALER, ASSIST DEVICES
SPACER (EA) MISCELLANEOUS
Qty: 1 EACH | Refills: 1 | Status: SHIPPED | OUTPATIENT
Start: 2019-02-15 | End: 2020-04-10

## 2019-02-15 NOTE — TELEPHONE ENCOUNTER
Patient called, missed appointment with Dr. Stark today, needs updated FMLA, (asthma) patient wonders if a telephone visit is possible, unable to take off work, no appointments available that work with patients schedule. He doesn't think he can see another provider. Please call patient. Ruth Behrens.

## 2019-02-15 NOTE — TELEPHONE ENCOUNTER
Pt called, informed that telephone appointment is scheduled for 2/18 @ 3:20, pt agreeable to appointment.    Malcolm Saul   02/15/19 2:18 PM

## 2019-02-15 NOTE — TELEPHONE ENCOUNTER
Sure, but not today, it is very busy today. Possible on Monday? At 11:20? As I think phone visit can be done before the meeting  Dalia Stark MD  [facility]  [phone]

## 2019-02-15 NOTE — PATIENT INSTRUCTIONS
Increase the frequency of Albuterol to every 4 hours until your breathing improves.     follow up with your primary care provider on February 18, 2019.    Go to the emergency room if you develop worsening, severe shortness of breath.

## 2019-02-15 NOTE — PROGRESS NOTES
SUBJECTIVE:   Yakov Cantrell is a 28 year old male (He stopped smoking one month ago.) with a history of mild persistent asthma.  He is presenting with a chief complaint of wheezing, chest tightness, shortness of breath.    Onset of symptoms was two days ago.  Course of illness is worsened today while at work but better while in the clinic. .   Severity severe today for 20 minutes earlier today.  .    Current and Associated symptoms: as listed above  Treatment measures tried include Albuterol MDI (patient has taken this medication once a day over the past three days), Albuterol nebs (about 1-2 times a day over the past three days).    Predisposing factors include history of mild persistent asthma.  .  Patient has noticed that his symptoms have worsened while at work at [employer].  His symptoms have improved while at the clinic.     Past Medical History:   Diagnosis Date     CARDIOVASCULAR SCREENING; LDL GOAL LESS THAN 160 6/19/2014     Mild persistent asthma, unspecified whether complicated 6/19/2018     Current Outpatient Medications   Medication Sig Dispense Refill     albuterol (2.5 MG/3ML) 0.083% neb solution INHALE 1 VIAL VIA NEB Q 4 - 6 H PRF WHEEZING.  1     beclomethasone (QVAR) 80 MCG/ACT Inhaler Inhale 1 puff into the lungs 2 times daily 3 Inhaler 1     cyclobenzaprine (FLEXERIL) 5 MG tablet TK 1 T PO Q 8 H PRF MUSCLE SPASMS.  0     Nebulizers (VIOS AEROSOL DELIVERY SYSTEM) MISC See Admin Instructions  0     Social History     Tobacco Use     Smoking status: Current Some Day Smoker     Smokeless tobacco: Never Used     Tobacco comment: bum cigs socially    Substance Use Topics     Alcohol use: Yes     Alcohol/week: 0.0 oz     Comment: rarely     Patient quit smoking one month ago.  Patient works at [employer] in [place].     ROS:  CONSTITUTIONAL:POSITIVE  for chills.  No fevers.    INTEGUMENTARY/SKIN: NEGATIVE for worrisome rashes, moles or lesions  EYES: NEGATIVE for vision changes or irritation  ENT/MOUTH:  NEGATIVE for ear, mouth and throat problems  RESP:POSITIVE for shortness of breath, wheezing.    CV:  Negative for heart rhythm abnormalities.      OBJECTIVE:  /82 (BP Location: Right arm, Patient Position: Chair, Cuff Size: Adult Large)   Pulse 71   Temp 97.3  F (36.3  C) (Tympanic)   Resp 20   SpO2 97%   GENERAL APPEARANCE: healthy, alert and no distress.  Patient can speak in full sentences without getting short of breath.    HENT: mouth:  within normal limits.   RESP: lungs clear to auscultation - no rales, rhonchi or wheezes  CV: regular rates and rhythm, normal S1 S2, no murmur noted  SKIN: no suspicious lesions or rashes.  Skin is pink and warm.     ASSESSMENT:  Mild Persistent Asthma with Acute Exacerbation      PLAN:  Rx:  Prednisone  Patient requested a spacer for his Albuterol inhaler.  I ordered one spacer for the patient.    follow up with the primary care provider on February 18, 2019.  See orders in Epic  Increase the frequency of Albuterol to Q4 hours until the symptoms improve.  Go to the emergency room if there is worsening severe shortness of breath.      Jovanni Pereyra MD

## 2019-02-15 NOTE — LETTER
[facility]  [address]  [address]  [facility] [address] [phone]      February 15, 2019    RE:  Yakov DEISI Óscar                                                                                                                                                       [id number]  [address] [place] [id number]            To whom it may concern:    Yakov Cantrell is under my professional care at the [facility] on February 15, 2019.  Because of his current illness, please excuse his absences from work on February 13 and February 15, 2019.    He  may return to work once he starts to feel better.          Sincerely,        Jovanni Pereyra MD    [facility]

## 2019-02-18 ENCOUNTER — VIRTUAL VISIT (OUTPATIENT)
Dept: FAMILY MEDICINE | Facility: CLINIC | Age: 29
End: 2019-02-18
Payer: COMMERCIAL

## 2019-02-18 DIAGNOSIS — J45.30 MILD PERSISTENT ASTHMA, UNSPECIFIED WHETHER COMPLICATED: Primary | ICD-10-CM

## 2019-02-18 PROCEDURE — 99443 ZZC PHYSICIAN TELEPHONE EVALUATION 21-30 MIN: CPT | Performed by: FAMILY MEDICINE

## 2019-02-18 NOTE — TELEPHONE ENCOUNTER
Have left several messages to return call to silver.  We do not have the paperwork to complete FMLA form.  Encounter closed.  eRgina George CMA

## 2019-02-18 NOTE — LETTER
Certification of Health Care Provider  Family Medical Leave Act (FMLA)      Employer's name and contact: Yakov Cantrell    Employee's job title:  Regular work schedule: 8:30-5:00 M-F    Employee's essential job functions: Office work    Patient's name: Yakov Cantrell    Provider's name and business address:  Dalia Stark MD  [address] [address]  Phone: [phone]      PART A:  MEDICAL FACTS  1)  Approximate date condition commenced: since childhood    Probable duration of condition:  Chronic      Wai below as applicable:  Was the patient admitted for an overnight stay in a hospital, hospice, or residential medical care facility?  no.    Date(s) you treated the patient for condition: 2/15 by Urgent care provider in [place].    Will the patient need to have treatment visits at least twice per year due to the                     condition? yes    Was medication, other than over-the-counter medication prescribed?  yes    Was the patient referred to other health care provider(s) for evaluation or treatment     (e.g., physical therapist)?  no.       2)  Is the medical condition pregnancy?  no.      3)  Is the employee unable to perform any of his/her job functions due to the     condition: no.      4)  Describe other relevant medical facts, if any, related to the condition which the employee seeks leave (such as medical facts may include symptoms, diagnosis, or any regimen of continuing treatment such as the use of specialized equipment):   Pt may have to take days off during asthma attacks due to problem with breathing and shortness of breath.      PART B: AMOUNT OF LEAVE NEEDED  5)  Will the employee be incapacitated for a single continuous period of time due to his/her medical condition, including any time for treatment and recovery?  yes:  Estimate the beginning and ending dates for the period of incapacity: wednesday 2/13 until Friday  2/15    6)  Will the employee need to attend follow-up treatment appointments or work part-time or on a reduced schedule because of the employee's medical condition?  no.    7) Will the condition cause episodic flare ups periodically preventing the employee from performing his/her job functions? yes:  Is it medically necessary for the patient to be absent from work during the flare-ups?  yes:  Explain: pt may get asthma exacerbation in the Future that may require time off.    Based upon the patient's medical history and your knowledge of the medical condition, estimate the frequency of flare-ups and the duration of related incapacity that the patient may have over the next six months (e.g., 1 episode every 3 months lasting 1-2 days):    Frequency: 4 X a month, once daily  Duration: 1 day(s)       ADDITIONAL INFORMATION: IDENTIFY QUESTION NUMBER WITH YOUR ADDITIONAL ANSWER none.      ===========================================    IF EMPLOYEE'S FAMILY MEMBER IS THE PATIENT, PLEASE COMPLETE SECTION BELOW:  N/A      13) Does the patient/family member need the employee to provide basic medical or personal needs, or for safety or transportation?      14)  If no, would the employee's presence to provide psychological comfort be beneficial to the patient or assist in the patient's recovery?        ================================================================    TO BE COMPLETED BY THE HEALTH CARE PROVIDER:    Signature:  Dalia Stark ________________________________________  Date:   2/18/2019

## 2019-02-18 NOTE — PROGRESS NOTES
"Yakov Cantrell is a 28 year old male who is being evaluated via a telephone visit.      The patient has been notified of following (by ANANTH George CMA       \"We have found that certain health care needs can be provided without the need for a physical exam.  This service lets us provide the care you need with a short phone conversation.  If a prescription is necessary we can send it directly to your pharmacy.  If lab work is needed we can place an order for that and you can then stop by our lab to have the test done at a later time.    This telephone visit will be conducted via 3 way call with the you (the patient) , the physician/provider, and a me all on the line at the same time.  This allows your physician/provider to have the phone conversation with you while I will be taking notes for your medical record.  We will have full access to your [place] medical record during this entire phone call.    Since this is like an office visit,  will bill your insurance company for this service.  Please check with your medical insurance if this type of telephone/virtual is covered . You may be responsible for the cost of this service if insurance coverage is denied.  The typical cost is $30 (10min), $59(11-20min) and $85 (21-30min)     If during the course of the call the physician/provider feels a telephone visit is not appropriate, you will not be charged for this service\"    Consent has been obtained for this service by care team member: yes.  See the scanned image in the medical record.    S: The history as provided by the patient to the provider during this 3 way call include pt has been having problem with his asthma recently that he ended in UC and was put on prednisone, he is worried that this will affect his job and wanted to do FMLA form for that.     Pertinent parts of the the patient's medical history reviewed and confirmed by the provider included : he stopped using asmanex long time ago as his asthma " was well controlled, but recently he noticed that his asthma has been worsen.      Total time of call between patient and provider was 18 minutes     Dalia Stark MD  [facility]  [phone]   (MD signature)  ===================================================    I have reviewed the note as documented above.  This accurately captures the substance of my conversation with the patient,    Additional provider notes:FMLA letter written and printed.    Assessment/Plan:  (J45.30) Mild persistent asthma, unspecified whether complicated  (primary encounter diagnosis)  Comment: not controlled, start on   Plan: beclomethasone HFA (QVAR REDIHALER) 80 MCG/ACT         inhaler        Twice daily, and recheck in 3 months.    (J45.998) Mild asthma  Comment:   Plan: FMLA form written today.

## 2019-02-23 ENCOUNTER — TELEPHONE (OUTPATIENT)
Dept: FAMILY MEDICINE | Facility: CLINIC | Age: 29
End: 2019-02-23

## 2019-02-23 NOTE — TELEPHONE ENCOUNTER
Reason for call:  Form   Our goal is to have forms completed within 72 hours, however some forms may require a visit or additional information.     Who is the form from? Employer (if other please explain)  Where did the form come from? Patient or family brought in     What clinic location was the form placed at? [place]  Where was the form placed? 's Box  What number is listed as a contact on the form? [phone]    Phone call message - patient request for a letter, form or note:     Date needed: as soon as possible  Please fax to [phone]  Has the patient signed a consent form for release of information? Not Applicable    Additional comments: Please fax once completed to [phone]    Type of letter, form or note: LA    Phone number to reach patient:  Home number on file [phone] (home)    Best Time:  Any    Can we leave a detailed message on this number?  YES

## 2019-02-25 NOTE — TELEPHONE ENCOUNTER
LM for patient to call back to clinic.   Per AA - FMLA letter was given at [facility] - is patient needing this in addition to the letter?

## 2019-02-25 NOTE — TELEPHONE ENCOUNTER
Pt returned call states he needs [employer] form filled out and sent to them, if you have questions feel free to call pt  Rosalie Lake/

## 2019-02-25 NOTE — TELEPHONE ENCOUNTER
Pt came into clinic and signed form, placed on [place] desk for tomorrow    Laure Lopez/VIRGIE  [facility]

## 2019-02-25 NOTE — TELEPHONE ENCOUNTER
Form completed with AA and signed.   Unable to fax-patient is needing to sign medical release section from before we can send in.   From in AA's folder-does patient want to stop by clinic or should we mail to patient.   A copy sent to abstract. Copy in brown folder.

## 2019-08-01 ENCOUNTER — HOSPITAL ENCOUNTER (EMERGENCY)
Facility: CLINIC | Age: 29
Discharge: HOME | End: 2019-08-01
Attending: EMERGENCY MEDICINE | Admitting: EMERGENCY MEDICINE
Payer: COMMERCIAL

## 2019-08-01 ENCOUNTER — OFFICE VISIT (OUTPATIENT)
Dept: FAMILY MEDICINE | Facility: CLINIC | Age: 29
End: 2019-08-01
Payer: COMMERCIAL

## 2019-08-01 VITALS
HEART RATE: 75 BPM | RESPIRATION RATE: 18 BRPM | TEMPERATURE: 98.2 F | SYSTOLIC BLOOD PRESSURE: 110 MMHG | DIASTOLIC BLOOD PRESSURE: 69 MMHG | OXYGEN SATURATION: 97 %

## 2019-08-01 VITALS
OXYGEN SATURATION: 100 % | BODY MASS INDEX: 39.42 KG/M2 | WEIGHT: 307 LBS | TEMPERATURE: 98.2 F | HEART RATE: 72 BPM | DIASTOLIC BLOOD PRESSURE: 78 MMHG | RESPIRATION RATE: 16 BRPM | SYSTOLIC BLOOD PRESSURE: 138 MMHG

## 2019-08-01 DIAGNOSIS — K21.9 GASTROESOPHAGEAL REFLUX DISEASE WITHOUT ESOPHAGITIS: ICD-10-CM

## 2019-08-01 DIAGNOSIS — R94.6 ABNORMAL FINDING ON THYROID FUNCTION TEST: Primary | ICD-10-CM

## 2019-08-01 DIAGNOSIS — I49.49 ECTOPIC BEATS: ICD-10-CM

## 2019-08-01 DIAGNOSIS — R00.2 PALPITATIONS: ICD-10-CM

## 2019-08-01 DIAGNOSIS — R94.6 ABNORMAL THYROID FUNCTION TEST: ICD-10-CM

## 2019-08-01 LAB
ANION GAP SERPL CALCULATED.3IONS-SCNC: 6 MMOL/L (ref 3–14)
BASOPHILS # BLD AUTO: 0.1 10E9/L (ref 0–0.2)
BASOPHILS NFR BLD AUTO: 0.8 %
BUN SERPL-MCNC: 13 MG/DL (ref 7–30)
CALCIUM SERPL-MCNC: 8.4 MG/DL (ref 8.5–10.1)
CHLORIDE SERPL-SCNC: 106 MMOL/L (ref 94–109)
CO2 SERPL-SCNC: 27 MMOL/L (ref 20–32)
CREAT SERPL-MCNC: 0.73 MG/DL (ref 0.66–1.25)
DIFFERENTIAL METHOD BLD: NORMAL
EOSINOPHIL # BLD AUTO: 0.4 10E9/L (ref 0–0.7)
EOSINOPHIL NFR BLD AUTO: 3.8 %
ERYTHROCYTE [DISTWIDTH] IN BLOOD BY AUTOMATED COUNT: 12.6 % (ref 10–15)
GFR SERPL CREATININE-BSD FRML MDRD: >90 ML/MIN/{1.73_M2}
GLUCOSE SERPL-MCNC: 127 MG/DL (ref 70–99)
HCT VFR BLD AUTO: 42.8 % (ref 40–53)
HGB BLD-MCNC: 13.9 G/DL (ref 13.3–17.7)
IMM GRANULOCYTES # BLD: 0.2 10E9/L (ref 0–0.4)
IMM GRANULOCYTES NFR BLD: 1.5 %
INTERPRETATION ECG - MUSE: NORMAL
LYMPHOCYTES # BLD AUTO: 2.4 10E9/L (ref 0.8–5.3)
LYMPHOCYTES NFR BLD AUTO: 22.5 %
MAGNESIUM SERPL-MCNC: 2.2 MG/DL (ref 1.6–2.3)
MCH RBC QN AUTO: 27.7 PG (ref 26.5–33)
MCHC RBC AUTO-ENTMCNC: 32.5 G/DL (ref 31.5–36.5)
MCV RBC AUTO: 85 FL (ref 78–100)
MONOCYTES # BLD AUTO: 1 10E9/L (ref 0–1.3)
MONOCYTES NFR BLD AUTO: 9.2 %
NEUTROPHILS # BLD AUTO: 6.6 10E9/L (ref 1.6–8.3)
NEUTROPHILS NFR BLD AUTO: 62.2 %
NRBC # BLD AUTO: 0 10*3/UL
NRBC BLD AUTO-RTO: 0 /100
PLATELET # BLD AUTO: 301 10E9/L (ref 150–450)
POTASSIUM SERPL-SCNC: 3.4 MMOL/L (ref 3.4–5.3)
RBC # BLD AUTO: 5.02 10E12/L (ref 4.4–5.9)
SODIUM SERPL-SCNC: 139 MMOL/L (ref 133–144)
T4 FREE SERPL-MCNC: 0.71 NG/DL (ref 0.76–1.46)
TSH SERPL DL<=0.005 MIU/L-ACNC: 4.71 MU/L (ref 0.4–4)
WBC # BLD AUTO: 10.7 10E9/L (ref 4–11)

## 2019-08-01 PROCEDURE — 25000128 H RX IP 250 OP 636: Performed by: EMERGENCY MEDICINE

## 2019-08-01 PROCEDURE — 85025 COMPLETE CBC W/AUTO DIFF WBC: CPT | Performed by: EMERGENCY MEDICINE

## 2019-08-01 PROCEDURE — 96360 HYDRATION IV INFUSION INIT: CPT

## 2019-08-01 PROCEDURE — 96361 HYDRATE IV INFUSION ADD-ON: CPT

## 2019-08-01 PROCEDURE — 83735 ASSAY OF MAGNESIUM: CPT | Performed by: EMERGENCY MEDICINE

## 2019-08-01 PROCEDURE — 84443 ASSAY THYROID STIM HORMONE: CPT | Performed by: EMERGENCY MEDICINE

## 2019-08-01 PROCEDURE — 93005 ELECTROCARDIOGRAM TRACING: CPT

## 2019-08-01 PROCEDURE — 84439 ASSAY OF FREE THYROXINE: CPT | Performed by: EMERGENCY MEDICINE

## 2019-08-01 PROCEDURE — 99214 OFFICE O/P EST MOD 30 MIN: CPT | Performed by: FAMILY MEDICINE

## 2019-08-01 PROCEDURE — 80048 BASIC METABOLIC PNL TOTAL CA: CPT | Performed by: EMERGENCY MEDICINE

## 2019-08-01 PROCEDURE — 99284 EMERGENCY DEPT VISIT MOD MDM: CPT | Mod: 25

## 2019-08-01 RX ADMIN — SODIUM CHLORIDE 1000 ML: 9 INJECTION, SOLUTION INTRAVENOUS at 00:47

## 2019-08-01 ASSESSMENT — ENCOUNTER SYMPTOMS
PALPITATIONS: 1
CHEST TIGHTNESS: 1
FEVER: 0
DIZZINESS: 0
ABDOMINAL PAIN: 0
LIGHT-HEADEDNESS: 0

## 2019-08-01 NOTE — PROGRESS NOTES
Subjective     Yakov Cantrell is a 29 year old male who presents to clinic today for the following health issues:    HPI   ED/UC Followup:    Facility:  [facility]  Date of visit: 8/1/19  Reason for visit: Palpitations, Abnormal thyroid function test  Current Status: better       palpitation      Onset: last night, and then on and off for 2 weeks.    Description (location/character/radiation/duration): last night he felt his heart was skipping a beat, then he got worried and went to the ER.    Also he get tightness in the chest and he usually requires to use albuterol with good results.    Intensity:  moderate    Accompanying signs and symptoms:        Shortness of breath: no        Sweating: no        Nausea/vomitting: no        Palpitations: YES, sometimes he gets palpitation         Other (fevers/chills/cough/heartburn/lightheadedness): YES : heart burn    History (similar episodes/previous evaluation): occasionally in the past.    Precipitating or alleviating factors:       Worse with exertion: no        Worse with breathing: no        Related to eating: no        Better with burping: no     Therapies tried and outcome: None    Pt has been having extra heart burn, also occasionally he feels like he cant swallows well, and he feels pain in the Lt upper abdomen.  Hypothyroidism noticed on labs in the ER.      Since last visit, patient describes the following symptoms: Weight stable, no hair loss, no skin changes, no constipation, no loose stools      Amount of exercise or physical activity: None    Problems taking medications regularly: No    Medication side effects: none    Diet: regular (no restrictions)         Patient Active Problem List   Diagnosis     CARDIOVASCULAR SCREENING; LDL GOAL LESS THAN 160     Herpes zoster without complication     Mild persistent asthma, unspecified whether complicated     History reviewed. No pertinent surgical history.    Social History     Tobacco Use     Smoking status:  Current Some Day Smoker     Smokeless tobacco: Never Used     Tobacco comment: bum cigs socially    Substance Use Topics     Alcohol use: Yes     Alcohol/week: 0.0 oz     Comment: rarely     Family History   Problem Relation Age of Onset     Abdominal Aortic Aneurysm Maternal Grandfather          Current Outpatient Medications   Medication Sig Dispense Refill     omeprazole (PRILOSEC) 20 MG DR capsule Take 1 capsule (20 mg) by mouth daily 90 capsule 3     albuterol (2.5 MG/3ML) 0.083% neb solution INHALE 1 VIAL VIA NEB Q 4 - 6 H PRF WHEEZING.  1     beclomethasone HFA (QVAR REDIHALER) 80 MCG/ACT inhaler Inhale 1 puff into the lungs 2 times daily 3 Inhaler 3     cyclobenzaprine (FLEXERIL) 5 MG tablet TK 1 T PO Q 8 H PRF MUSCLE SPASMS.  0     Nebulizers (Perpetu AEROSOL DELIVERY SYSTEM) MISC See Admin Instructions  0     predniSONE (DELTASONE) 20 MG tablet Take 3 tabs PO daily x 7 days. 21 tablet 0     spacer (OPTICHAMBER DEMETRIUS) holding chamber Use with the Albuterol inhaler. 1 each 1     Allergies   Allergen Reactions     Amoxicillin      Sulfa Drugs      Vancomycin      Recent Labs   Lab Test 08/01/19  0046 07/17/17  1619   ALT  --  29   CR 0.73  --    GFRESTIMATED >90  --    GFRESTBLACK >90  --    POTASSIUM 3.4  --    TSH 4.71*  --         Reviewed and updated as needed this visit by Provider         Review of Systems   ROS COMP: CONSTITUTIONAL: NEGATIVE for fever, chills, change in weight  ENT/MOUTH: NEGATIVE for ear, mouth and throat problems  GI: heartburn or reflux  PSYCHIATRIC: anxiety especially about his health.      Objective    There were no vitals taken for this visit.  There is no height or weight on file to calculate BMI.  Physical Exam   GENERAL: healthy, alert and no distress  NECK: no adenopathy, no asymmetry, masses, or scars and thyroid normal to palpation  RESP: lungs clear to auscultation - no rales, rhonchi or wheezes  CV: regular rate and rhythm, normal S1 S2, no S3 or S4, no murmur, click or  rub, no peripheral edema and peripheral pulses strong  ABDOMEN: soft, nontender, no hepatosplenomegaly, no masses and bowel sounds normal  MS: no gross musculoskeletal defects noted, no edema    Diagnostic Test Results:  Results for orders placed or performed during the hospital encounter of 08/01/19   CBC with platelets differential   Result Value Ref Range    WBC 10.7 4.0 - 11.0 10e9/L    RBC Count 5.02 4.4 - 5.9 10e12/L    Hemoglobin 13.9 13.3 - 17.7 g/dL    Hematocrit 42.8 40.0 - 53.0 %    MCV 85 78 - 100 fl    MCH 27.7 26.5 - 33.0 pg    MCHC 32.5 31.5 - 36.5 g/dL    RDW 12.6 10.0 - 15.0 %    Platelet Count 301 150 - 450 10e9/L    Diff Method Automated Method     % Neutrophils 62.2 %    % Lymphocytes 22.5 %    % Monocytes 9.2 %    % Eosinophils 3.8 %    % Basophils 0.8 %    % Immature Granulocytes 1.5 %    Nucleated RBCs 0 0 /100    Absolute Neutrophil 6.6 1.6 - 8.3 10e9/L    Absolute Lymphocytes 2.4 0.8 - 5.3 10e9/L    Absolute Monocytes 1.0 0.0 - 1.3 10e9/L    Absolute Eosinophils 0.4 0.0 - 0.7 10e9/L    Absolute Basophils 0.1 0.0 - 0.2 10e9/L    Abs Immature Granulocytes 0.2 0 - 0.4 10e9/L    Absolute Nucleated RBC 0.0    Basic metabolic panel   Result Value Ref Range    Sodium 139 133 - 144 mmol/L    Potassium 3.4 3.4 - 5.3 mmol/L    Chloride 106 94 - 109 mmol/L    Carbon Dioxide 27 20 - 32 mmol/L    Anion Gap 6 3 - 14 mmol/L    Glucose 127 (H) 70 - 99 mg/dL    Urea Nitrogen 13 7 - 30 mg/dL    Creatinine 0.73 0.66 - 1.25 mg/dL    GFR Estimate >90 >60 mL/min/[1.73_m2]    GFR Estimate If Black >90 >60 mL/min/[1.73_m2]    Calcium 8.4 (L) 8.5 - 10.1 mg/dL   TSH with free T4 reflex   Result Value Ref Range    TSH 4.71 (H) 0.40 - 4.00 mU/L   Magnesium   Result Value Ref Range    Magnesium 2.2 1.6 - 2.3 mg/dL   T4 free   Result Value Ref Range    T4 Free 0.71 (L) 0.76 - 1.46 ng/dL   EKG 12 lead   Result Value Ref Range    Interpretation ECG Click View Image link to view waveform and result            Assessment &  "Plan     1. Abnormal finding on thyroid function test  Pt does not have symptoms of Hypothyroidism, at this time, I will repeat the test in 1 month. If the level is still abnormal, I recommend treating.  - **TSH with free T4 reflex FUTURE anytime; Future  - T3 Free; Future    2. Gastroesophageal reflux disease without esophagitis  Will start on   - omeprazole (PRILOSEC) 20 MG DR capsule; Take 1 capsule (20 mg) by mouth daily  Dispense: 90 capsule; Refill: 3    3. Ectopic beats  I think his symptoms are mostly related to PVC\"s, given the absence of other worrisome symptoms, and negative studies done in the ER, I think it is reasonable to continue on watchful waiting, and to call if symptoms recur or worsen, at that time, will order Zio patch, The tests, diagnosis, and treatment plan was discussed with the patient, and agreed on.   Discussed options for        Tobacco Cessation:   reports that he has been smoking.  He has never used smokeless tobacco.  Tobacco Cessation Action Plan: Information offered: Patient not interested at this time            Return in about 4 weeks (around 8/29/2019).    Dalia Stark MD  [facility]"

## 2019-08-01 NOTE — DISCHARGE INSTRUCTIONS
Discharge Instructions  Palpitations    Palpitations are an unusual awareness of your heartbeat. People often describe this as the heart skipping, fluttering, racing, irregular, or pounding. At this time, your provider has found no signs that your palpitations are due to a serious or life-threatening condition. However, sometimes there is a serious problem that does not show up right away.    Palpitations can be caused by caffeine, cigarettes, diet pills, energy drinks or supplements, other stimulants, and medications and street drugs. They can also be caused by anxiety, hormone conditions such as high thyroid, and other medical conditions. Sometimes they are a sign of abnormal rhythm in the heart. At this time, your provider did not find any dangerous cause of your symptoms.    Generally, every Emergency Department visit should have a follow-up clinic visit with either a primary or a specialty clinic/provider. Please follow-up as instructed by your emergency provider today.    Return to the Emergency Department if:  You get chest pain or tightness.  You are short of breath.  You get very weak or tired.  You pass out or faint.  Your heart rate is over 120 beats per minute for more than 10 minutes while you are resting.   You have anything else that worries you.    What can I do to help myself?  Fill any prescriptions the provider gave you and take them right away.   Follow your provider s instructions about the prescription medicines you are on. Sometimes the provider may tell you to stop taking a medicine or change the dose.  If you smoke, this may be a good time to quit! The less you can smoke, the better.  Do not use energy drinks, diet pills, or stimulants. Limit your use of caffeine.  If you were given a prescription for medicine here today, be sure to read all of the information (including the package insert) that comes with your prescription.  This will include important information about the medicine, its  side effects, and any warnings that you need to know about.  The pharmacist who fills the prescription can provide more information and answer questions you may have about the medicine.  If you have questions or concerns that the pharmacist cannot address, please call or return to the Emergency Department.     Remember that you can always come back to the Emergency Department if you are not able to see your regular provider in the amount of time listed above, if you get any new symptoms, or if there is anything that worries you.       Your thyroid test was just slightly out of normal range. It is not the cause of your symptoms today. Discuss this further with your primary doctor.

## 2019-08-01 NOTE — ED AVS SNAPSHOT
[facility] Emergency Department  [address]  [facility] [address]  Phone:  [phone]  Fax:  [phone]                                    Yakov Cantrell   MRN: [id number]    Department:  [facility] Emergency Department   Date of Visit:  8/1/2019           After Visit Summary Signature Page    I have received my discharge instructions, and my questions have been answered. I have discussed any challenges I see with this plan with the nurse or doctor.    ..........................................................................................................................................  Patient/Patient Representative Signature      ..........................................................................................................................................  Patient Representative Print Name and Relationship to Patient    ..................................................               ................................................  Date                                   Time    ..........................................................................................................................................  Reviewed by Signature/Title    ...................................................              ..............................................  Date                                               Time          22EPIC Rev 08/18

## 2019-08-01 NOTE — ED PROVIDER NOTES
"  History     Chief Complaint:  Palpitations      HPI   Yakov Cantrell is a 29 year old male who presents to the emergency department today for evaluation of palpitations. The patient reports while laying in bed tonight two hours prior while watching TV he says \"it felt like my heart stopped\". He notes he felt chest tightness with palpitations and two episodes of the heart stopping feeling at which point he decided to come to the ED. He has not had this sensation in the past. He denies recreational drug use, alcohol use tonight or herbal use. He denies excess caffeine or energy drinks. He has had no recent travel or surgery. He denies dizziness, lightheadedness, abdominal pain, and fever. He currently denies any symptoms and notes \"it might have been an anxiety attack\".       Allergies:  Amoxicillin  Sulfa Drugs  Vancomycin        Medications:    Albuterol      Past Medical History:    Asthma  Allergic rhinitis  ADD    Past Surgical History:    History reviewed. No pertinent past surgical history.     Family History:    Alcohol/drug abuse  Hyperlipidemia  Migraines  Asthma    Social History:  The patient was accompanied to the ED by himself.  Smoking Status: Current Some Day Smoker  Smokeless Tobacco: Never Used  Alcohol Use: Yes   Marital Status:  Single [1]       Review of Systems   Constitutional: Negative for fever.   Respiratory: Positive for chest tightness.    Cardiovascular: Positive for palpitations.   Gastrointestinal: Negative for abdominal pain.   Neurological: Negative for dizziness and light-headedness.   All other systems reviewed and are negative.        Physical Exam   First Vitals:  BP: (!) 154/101  Pulse: 100  Heart Rate: 93  Temp: 98.2  F (36.8  C)  Resp: 18  SpO2: 97 %      Physical Exam  General: Adult male sitting upright  Eyes: PERRL, Conjunctive within normal limits  ENT: Moist mucous membranes, oropharynx clear.   Neck: No thyromegaly palpable  CV: Normal S1S2, no murmur, rub or gallop. " Regular rate and rhythm  Resp: Clear to auscultation bilaterally, no wheezes, rales or rhonchi. Normal respiratory effort.  GI: Abdomen is soft, nontender and nondistended. No palpable masses. No rebound or guarding.  MSK: No edema. Nontender. Normal active range of motion.  Skin: Warm and dry. No rashes or lesions or ecchymoses on visible skin.  Neuro: Alert and oriented. Responds appropriately to all questions and commands. No focal findings appreciated. Normal muscle tone.  Psych: Mildly anxious appearing. Pleasant.    Emergency Department Course     ECG:  Indication: palpitations  Completed at 0037.  Read at 0037.   Normal sinus rhythm. Normal ECG.   Rate 89 bpm. MN interval 136. QRS duration 98. QT/QTc 368/447. P-R-T axes 41 17 50.     Laboratory:  Laboratory findings were communicated with the patient who voiced understanding of the findings.    CBC: WBC 10.7, HGB 13.9,    BMP: Glucose 127 (H), Calcium 8.4 (L) o/w WNL (Creatinine 0.73)   TSH with free T4 reflex: 4.71 (H)   Magnesium: 2.2  T4 free: 0.71 (L)    Interventions:  0047 NS Bolus 1,000mL IV        Emergency Department Course:  Nursing notes and vitals reviewed.  0046: I performed an exam of the patient as documented above.   IV was inserted and blood was drawn for laboratory testing, results above.      0115 Patient rechecked and updated.  He denies any new concerns.     0223 Patient rechecked and updated.  He reports no recurrence of symptoms.     Findings and plan explained to the Patient. Patient discharged home with instructions regarding supportive care, medications, and reasons to return. The importance of close follow-up was reviewed.   I personally reviewed the laboratory  results with the Patient and answered all related questions prior to  discharge.     Impression & Plan      Medical Decision Making:    Yakov Cantrell is a 29 year old male presented with a sensation of palpitations.  The work up in the Emergency Department is  negative, monitoring and EKG have not shown any abnormal heart rhythms or concerning morphologies.  I considered a broad differential diagnosis including acute coronary syndrome, myocardial infarction, pulmonary embolism, electrolyte abnormalities, drug reaction, anxiety, amongst others.  Electrolytes are normal and the patient is not anemic.  No EKG changes concerning for acute coronary syndrome.  No concern for PE.  A Zio patch monitor was ordered.  He has slightly low T4 suggestive of hypothyroidism although it is just below normal range and isunlikely to be the cause of his symptoms today. No serious etiology for the palpitations were detected today during this visit and I feel the patient is safe for discharge.   Close follow up with primary care is indicated should the symptoms continue, as further work up may be performed; this was made clear to the patient, who understands. He should return immediately to the ED with worsening/recurrence of symptoms.    Diagnosis:    ICD-10-CM    1. Palpitations R00.2    2. Abnormal thyroid function test R94.6        Disposition:  Discharged to home.       Scribe Disclosure:  I, Anupama Ryan, am serving as a scribe at 12:37 AM on 8/1/2019 to document services personally performed by Mary Ann Mclaughlin MD based on my observations and the provider's statements to me.    Anupama Ryan  8/1/2019   [facility] EMERGENCY DEPARTMENT       Mary Ann Mclaughlin MD  08/01/19 0029

## 2019-08-01 NOTE — ED TRIAGE NOTES
"Pt states had palpitations (\"it feels like my heart stopped\") when laying in bed tonight. Pt denies chest pain or dyspnea at rest or with exertion. ABCs intact GCS 15  "

## 2020-01-10 ENCOUNTER — TELEPHONE (OUTPATIENT)
Dept: FAMILY MEDICINE | Facility: CLINIC | Age: 30
End: 2020-01-10

## 2020-01-10 NOTE — LETTER
[facility]  [address] [address] [address]  [phone]  January 10, 2020    Yakov Cantrell  [address] [place] [address]    Dear Yakov,    I care about your health and have reviewed your health plan. I have reviewed your medical conditions, medication list, and lab results and am making recommendations based on this review, to better manage your health.    You are in particular need of attention regarding:  -Asthma    I am recommending that you:  {recommendations: -Complete and return the attached ASTHMA CONTROL TEST.  If your total score is 19 or less or you have been to the ER or urgent care for your asthma, then please schedule an asthma followup appointment.    Here is a list of Health Maintenance topics that are due now or due soon:  Health Maintenance Due   Topic Date Due     PREVENTIVE CARE VISIT  1989     ANNUAL REVIEW OF HM ORDERS  1989     HIV SCREENING  07/03/2004     PNEUMOCOCCAL IMMUNIZATION 19-64 MEDIUM RISK (1 of 1 - PPSV23) 07/03/2008     ASTHMA CONTROL TEST  05/03/2019     ASTHMA ACTION PLAN  06/19/2019     INFLUENZA VACCINE (1) 09/01/2019     PHQ-2  01/01/2020     Please call us at [phone] (or use [employer]) to address the above recommendations.     Thank you for trusting [facility] and we appreciate the opportunity to serve you.  We look forward to supporting your healthcare needs in the future.    Healthy Regards,    Dalia Stark MD

## 2020-01-10 NOTE — TELEPHONE ENCOUNTER
Panel Management Review      Patient has the following on his problem list:   Asthma review     ACT Total Scores 11/3/2018   ACT TOTAL SCORE (Goal Greater than or Equal to 20) 20   In the past 12 months, how many times did you visit the emergency room for your asthma without being admitted to the hospital? 0   In the past 12 months, how many times were you hospitalized overnight because of your asthma? 0      1. Is Asthma diagnosis on the Problem List? Yes    2. Is Asthma listed on Health Maintenance? Yes    3. Patient is due for:  ACT and AAP      Composite cancer screening  Chart review shows that this patient is due/due soon for the following None  Summary:    Patient is due/failing the following:   AAP and ACT    Action needed:   Patient needs to do ACT.    Type of outreach:    Sent letter.    Questions for provider review:    None                                                                                                                                    Regina George CMA       Chart routed to Care Team .

## 2020-04-08 ENCOUNTER — VIRTUAL VISIT (OUTPATIENT)
Dept: FAMILY MEDICINE | Facility: CLINIC | Age: 30
End: 2020-04-08
Payer: COMMERCIAL

## 2020-04-08 DIAGNOSIS — Z53.9 ERRONEOUS ENCOUNTER--DISREGARD: Primary | ICD-10-CM

## 2020-04-10 ENCOUNTER — VIRTUAL VISIT (OUTPATIENT)
Dept: FAMILY MEDICINE | Facility: CLINIC | Age: 30
End: 2020-04-10
Payer: COMMERCIAL

## 2020-04-10 DIAGNOSIS — J45.30 MILD PERSISTENT ASTHMA, UNSPECIFIED WHETHER COMPLICATED: Primary | ICD-10-CM

## 2020-04-10 PROCEDURE — 99213 OFFICE O/P EST LOW 20 MIN: CPT | Mod: TEL | Performed by: FAMILY MEDICINE

## 2020-04-10 NOTE — PROGRESS NOTES
"Subjective     Yakov Cantrell is a 29 year old male who is being evaluated via a billable telephone visit.      The patient has been notified of following:     \"This telephone visit will be conducted via a call between you and your physician/provider. We have found that certain health care needs can be provided without the need for a physical exam.  This service lets us provide the care you need with a short phone conversation.  If a prescription is necessary we can send it directly to your pharmacy.  If lab work is needed we can place an order for that and you can then stop by our lab to have the test done at a later time.    Telephone visits are billed at different rates depending on your insurance coverage. During this emergency period, for some insurers they may be billed the same as an in-person visit.  Please reach out to your insurance provider with any questions.    If during the course of the call the physician/provider feels a telephone visit is not appropriate, you will not be charged for this service.\"    Patient has given verbal consent for Telephone visit?  Yes    How would you like to obtain your AVS? MyChart    Yakov Cantrell complains of   Chief Complaint   Patient presents with     Forms       ALLERGIES  Amoxicillin; Sulfa drugs; and Vancomycin    Forms - FMLA          Asthma Follow-Up    Was ACT completed today?    Yes    ACT Total Scores 11/3/2018   ACT TOTAL SCORE (Goal Greater than or Equal to 20) 20   In the past 12 months, how many times did you visit the emergency room for your asthma without being admitted to the hospital? 0   In the past 12 months, how many times were you hospitalized overnight because of your asthma? 0       How many days per week do you miss taking your asthma controller medication?  0    Please describe any recent triggers for your asthma: anxiety, seasonal changes    Have you had any Emergency Room Visits, Urgent Care Visits, or Hospital Admissions since your last " office visit?  No      Patient Active Problem List   Diagnosis     CARDIOVASCULAR SCREENING; LDL GOAL LESS THAN 160     Herpes zoster without complication     Mild persistent asthma, unspecified whether complicated     History reviewed. No pertinent surgical history.    Social History     Tobacco Use     Smoking status: Current Some Day Smoker     Smokeless tobacco: Never Used     Tobacco comment: bum cigs socially    Substance Use Topics     Alcohol use: Yes     Alcohol/week: 0.0 standard drinks     Comment: rarely     Family History   Problem Relation Age of Onset     Abdominal Aortic Aneurysm Maternal Grandfather          Current Outpatient Medications   Medication Sig Dispense Refill     albuterol (2.5 MG/3ML) 0.083% neb solution INHALE 1 VIAL VIA NEB Q 4 - 6 H PRF WHEEZING.  1     cyclobenzaprine (FLEXERIL) 5 MG tablet TK 1 T PO Q 8 H PRF MUSCLE SPASMS.  0     omeprazole (PRILOSEC) 20 MG DR capsule Take 1 capsule (20 mg) by mouth daily 90 capsule 3     Allergies   Allergen Reactions     Amoxicillin      Sulfa Drugs      Vancomycin        Reviewed and updated as needed this visit by Provider         Review of Systems   ROS COMP: CONSTITUTIONAL: NEGATIVE for fever, chills, change in weight  ENT/MOUTH: NEGATIVE for ear, mouth and throat problems  CV: NEGATIVE for chest pain, palpitations or peripheral edema       Objective   Reported vitals:  There were no vitals taken for this visit.   healthy, alert and no distress  Psych: Alert and oriented times 3; coherent speech, normal   rate and volume, able to articulate logical thoughts, able   to abstract reason, no tangential thoughts, no hallucinations   or delusions  His affect is normal.             Assessment/Plan:  1. Mild persistent asthma, unspecified whether complicated  Continue on same meds, will fill FMLA form for patient          Phone call duration:  15 minutes    Dalia Stark MD

## 2020-04-13 ENCOUNTER — TELEPHONE (OUTPATIENT)
Dept: FAMILY MEDICINE | Facility: CLINIC | Age: 30
End: 2020-04-13

## 2020-04-13 NOTE — TELEPHONE ENCOUNTER
Panel Management Review      Patient has the following on his problem list:     Asthma review     ACT Total Scores 11/3/2018   ACT TOTAL SCORE (Goal Greater than or Equal to 20) 20   In the past 12 months, how many times did you visit the emergency room for your asthma without being admitted to the hospital? 0   In the past 12 months, how many times were you hospitalized overnight because of your asthma? 0      1. Is Asthma diagnosis on the Problem List? Yes    2. Is Asthma listed on Health Maintenance? Yes    3. Patient is due for:  ACT      Composite cancer screening  Chart review shows that this patient is due/due soon for the following None  Summary:    Patient is due/failing the following:   ACT    Action needed:   Patient needs to do ACT.    Type of outreach:    Sent letter.    Questions for provider review:    None                                                                                                                                    Regina George CMA       Chart routed to Care Team .

## 2020-04-13 NOTE — LETTER
[facility]  [address] [address] [facility] [address]  [phone]  April 13, 2020    Yakov Cantrell  [address]  [place] [facility] [address]    Dear Yakov,    I care about your health and have reviewed your health plan. I have reviewed your medical conditions, medication list, and lab results and am making recommendations based on this review, to better manage your health.    You are in particular need of attention regarding:  -Asthma    I am recommending that you:  {recommendations: -Complete and return the attached ASTHMA CONTROL TEST.  If your total score is 19 or less or you have been to the ER or urgent care for your asthma, then please schedule an asthma followup appointment.    Here is a list of Health Maintenance topics that are due now or due soon:  Health Maintenance Due   Topic Date Due     PREVENTIVE CARE VISIT  1989     ANNUAL REVIEW OF HM ORDERS  1989     HIV SCREENING  07/03/2004     PNEUMOCOCCAL IMMUNIZATION 19-64 MEDIUM RISK (1 of 1 - PPSV23) 07/03/2008     ASTHMA CONTROL TEST  05/03/2019     PHQ-2  01/01/2020       Please call us at [phone] (or use [employer]) to address the above recommendations.     Thank you for trusting [facility] and we appreciate the opportunity to serve you.  We look forward to supporting your healthcare needs in the future.    Healthy Regards,    Dalia Stark MD

## 2020-08-20 ENCOUNTER — TELEPHONE (OUTPATIENT)
Dept: FAMILY MEDICINE | Facility: CLINIC | Age: 30
End: 2020-08-20

## 2020-08-20 NOTE — LETTER
[facility]  [address] [address] [facility] [address] [address]  [phone]  August 20, 2020    Yakov Cantrell  [address]  [place] [address]    Dear Yakov,    I care about your health and have reviewed your health plan. I have reviewed your medical conditions, medication list, and lab results and am making recommendations based on this review, to better manage your health.    You are in particular need of attention regarding:  -Asthma    I am recommending that you:  {recommendations: -Complete and return the attached ASTHMA CONTROL TEST.  If your total score is 19 or less or you have been to the ER or urgent care for your asthma, then please schedule an asthma followup appointment.    Here is a list of Health Maintenance topics that are due now or due soon:  Health Maintenance Due   Topic Date Due     PREVENTIVE CARE VISIT  1989     ANNUAL REVIEW OF HM ORDERS  1989     HIV SCREENING  07/03/2004     PNEUMOCOCCAL IMMUNIZATION 19-64 MEDIUM RISK (1 of 1 - PPSV23) 07/03/2008     ASTHMA CONTROL TEST  05/03/2019     PHQ-2  01/01/2020       Please call us at [phone] (or use [employer]) to address the above recommendations.     Thank you for trusting [facility] and we appreciate the opportunity to serve you.  We look forward to supporting your healthcare needs in the future.    Healthy Regards,    Dalia Stakr MD

## 2020-10-23 ENCOUNTER — HOSPITAL ENCOUNTER (EMERGENCY)
Facility: CLINIC | Age: 30
Discharge: HOME | End: 2020-10-23
Attending: PHYSICIAN ASSISTANT | Admitting: PHYSICIAN ASSISTANT
Payer: COMMERCIAL

## 2020-10-23 ENCOUNTER — TELEPHONE (OUTPATIENT)
Dept: FAMILY MEDICINE | Facility: CLINIC | Age: 30
End: 2020-10-23

## 2020-10-23 ENCOUNTER — APPOINTMENT (OUTPATIENT)
Dept: GENERAL RADIOLOGY | Facility: CLINIC | Age: 30
End: 2020-10-23
Attending: PHYSICIAN ASSISTANT
Payer: COMMERCIAL

## 2020-10-23 VITALS
WEIGHT: 310 LBS | DIASTOLIC BLOOD PRESSURE: 92 MMHG | SYSTOLIC BLOOD PRESSURE: 151 MMHG | HEART RATE: 78 BPM | HEIGHT: 74 IN | TEMPERATURE: 98.5 F | RESPIRATION RATE: 16 BRPM | OXYGEN SATURATION: 98 % | BODY MASS INDEX: 39.78 KG/M2

## 2020-10-23 DIAGNOSIS — R03.0 ELEVATED BP WITHOUT DIAGNOSIS OF HYPERTENSION: ICD-10-CM

## 2020-10-23 DIAGNOSIS — R06.02 SHORTNESS OF BREATH: ICD-10-CM

## 2020-10-23 DIAGNOSIS — R07.9 ACUTE CHEST PAIN: ICD-10-CM

## 2020-10-23 DIAGNOSIS — J45.21 EXACERBATION OF INTERMITTENT ASTHMA, UNSPECIFIED ASTHMA SEVERITY: ICD-10-CM

## 2020-10-23 LAB
ANION GAP SERPL CALCULATED.3IONS-SCNC: 9 MMOL/L (ref 3–14)
BASOPHILS # BLD AUTO: 0.1 10E9/L (ref 0–0.2)
BASOPHILS NFR BLD AUTO: 0.9 %
BUN SERPL-MCNC: 10 MG/DL (ref 7–30)
CALCIUM SERPL-MCNC: 8.8 MG/DL (ref 8.5–10.1)
CHLORIDE SERPL-SCNC: 107 MMOL/L (ref 94–109)
CO2 SERPL-SCNC: 24 MMOL/L (ref 20–32)
CREAT SERPL-MCNC: 0.66 MG/DL (ref 0.66–1.25)
DIFFERENTIAL METHOD BLD: NORMAL
EOSINOPHIL # BLD AUTO: 0.3 10E9/L (ref 0–0.7)
EOSINOPHIL NFR BLD AUTO: 3.6 %
ERYTHROCYTE [DISTWIDTH] IN BLOOD BY AUTOMATED COUNT: 12.7 % (ref 10–15)
GFR SERPL CREATININE-BSD FRML MDRD: >90 ML/MIN/{1.73_M2}
GLUCOSE SERPL-MCNC: 84 MG/DL (ref 70–99)
HCT VFR BLD AUTO: 47.4 % (ref 40–53)
HGB BLD-MCNC: 15.3 G/DL (ref 13.3–17.7)
IMM GRANULOCYTES # BLD: 0.1 10E9/L (ref 0–0.4)
IMM GRANULOCYTES NFR BLD: 1.1 %
LYMPHOCYTES # BLD AUTO: 2.1 10E9/L (ref 0.8–5.3)
LYMPHOCYTES NFR BLD AUTO: 22.8 %
MCH RBC QN AUTO: 27.6 PG (ref 26.5–33)
MCHC RBC AUTO-ENTMCNC: 32.3 G/DL (ref 31.5–36.5)
MCV RBC AUTO: 86 FL (ref 78–100)
MONOCYTES # BLD AUTO: 0.9 10E9/L (ref 0–1.3)
MONOCYTES NFR BLD AUTO: 9.3 %
NEUTROPHILS # BLD AUTO: 5.9 10E9/L (ref 1.6–8.3)
NEUTROPHILS NFR BLD AUTO: 62.3 %
NRBC # BLD AUTO: 0 10*3/UL
NRBC BLD AUTO-RTO: 0 /100
PLATELET # BLD AUTO: 357 10E9/L (ref 150–450)
POTASSIUM SERPL-SCNC: 3.8 MMOL/L (ref 3.4–5.3)
RBC # BLD AUTO: 5.54 10E12/L (ref 4.4–5.9)
SODIUM SERPL-SCNC: 140 MMOL/L (ref 133–144)
TROPONIN I SERPL-MCNC: <0.015 UG/L (ref 0–0.04)
WBC # BLD AUTO: 9.4 10E9/L (ref 4–11)

## 2020-10-23 PROCEDURE — 250N000013 HC RX MED GY IP 250 OP 250 PS 637: Performed by: PHYSICIAN ASSISTANT

## 2020-10-23 PROCEDURE — 85025 COMPLETE CBC W/AUTO DIFF WBC: CPT | Performed by: PHYSICIAN ASSISTANT

## 2020-10-23 PROCEDURE — 84484 ASSAY OF TROPONIN QUANT: CPT | Performed by: PHYSICIAN ASSISTANT

## 2020-10-23 PROCEDURE — 99285 EMERGENCY DEPT VISIT HI MDM: CPT | Mod: 25

## 2020-10-23 PROCEDURE — U0003 INFECTIOUS AGENT DETECTION BY NUCLEIC ACID (DNA OR RNA); SEVERE ACUTE RESPIRATORY SYNDROME CORONAVIRUS 2 (SARS-COV-2) (CORONAVIRUS DISEASE [COVID-19]), AMPLIFIED PROBE TECHNIQUE, MAKING USE OF HIGH THROUGHPUT TECHNOLOGIES AS DESCRIBED BY CMS-2020-01-R: HCPCS | Performed by: PHYSICIAN ASSISTANT

## 2020-10-23 PROCEDURE — 93005 ELECTROCARDIOGRAM TRACING: CPT

## 2020-10-23 PROCEDURE — C9803 HOPD COVID-19 SPEC COLLECT: HCPCS

## 2020-10-23 PROCEDURE — 80048 BASIC METABOLIC PNL TOTAL CA: CPT | Performed by: PHYSICIAN ASSISTANT

## 2020-10-23 PROCEDURE — 250N000012 HC RX MED GY IP 250 OP 636 PS 637: Performed by: PHYSICIAN ASSISTANT

## 2020-10-23 PROCEDURE — 71045 X-RAY EXAM CHEST 1 VIEW: CPT

## 2020-10-23 RX ORDER — ASPIRIN 325 MG
325 TABLET ORAL ONCE
Status: COMPLETED | OUTPATIENT
Start: 2020-10-23 | End: 2020-10-23

## 2020-10-23 RX ORDER — PREDNISONE 20 MG/1
TABLET ORAL
Qty: 10 TABLET | Refills: 0 | Status: SHIPPED | OUTPATIENT
Start: 2020-10-23 | End: 2020-11-06

## 2020-10-23 RX ORDER — PREDNISONE 20 MG/1
40 TABLET ORAL ONCE
Status: COMPLETED | OUTPATIENT
Start: 2020-10-23 | End: 2020-10-23

## 2020-10-23 RX ADMIN — ASPIRIN 325 MG ORAL TABLET 325 MG: 325 PILL ORAL at 14:17

## 2020-10-23 RX ADMIN — PREDNISONE 40 MG: 20 TABLET ORAL at 14:17

## 2020-10-23 ASSESSMENT — ENCOUNTER SYMPTOMS
FEVER: 0
NAUSEA: 0
VOMITING: 0
DIARRHEA: 0
COUGH: 0
SHORTNESS OF BREATH: 1

## 2020-10-23 ASSESSMENT — MIFFLIN-ST. JEOR: SCORE: 2430.9

## 2020-10-23 NOTE — ED AVS SNAPSHOT
[facility]  [address]  [facility] [address]  Phone: [phone]  Fax: [phone]                                    Yakov Cantrell   MRN: [id number]    Department: [facility] Emergency [facility]   Date of Visit: 10/23/2020           After Visit Summary Signature Page    I have received my discharge instructions, and my questions have been answered. I have discussed any challenges I see with this plan with the nurse or doctor.    ..........................................................................................................................................  Patient/Patient Representative Signature      ..........................................................................................................................................  Patient Representative Print Name and Relationship to Patient    ..................................................               ................................................  Date                                   Time    ..........................................................................................................................................  Reviewed by Signature/Title    ...................................................              ..............................................  Date                                               Time          22EPIC Rev 08/18

## 2020-10-23 NOTE — ED TRIAGE NOTES
Pt c/o chest pressure for about a week.  Feels like someone is  Sitting on his chest.  Pt feeling like he cant get a full breath at times.  Pt did try his inhaler and it feels like it was worse.

## 2020-10-23 NOTE — TELEPHONE ENCOUNTER
Patient calling and states he has been having chest pain.  Scheduled at 4 pm in .  Chest tightness and chest pain.  States has asthma and allergies and thought was that.  Yesterday felt like someone squeezing his chest last night around 9 pm and lasted 20-30 minutes.  Has been going on for a week or so.  No cough, no fever.  Has albuterol inhaler.  Used when first felt chest tightness but felt it made it worse.  Chest pain at sternum level and sides of it.  States still feels tight.  States last night felt he should call 911 but did not.   does not feel like his asthma.   usually has wheezing with that and tingling in chest and does not feel that currently.  Advised to have someone drive him to [facility].  Patient will go to [facility] for evaluation.  Nhi Dykes RN

## 2020-10-23 NOTE — DISCHARGE INSTRUCTIONS
Use albuterol inhaler every 4 hours for shortness of breath.   Use prednisone burst for tightness in chest.   Follow up with primary care for further workup.   Discharge Instructions  Asthma    Asthma is a condition causing narrowing and inflammation of the airways that can make it hard to breathe.  Asthma can also cause cough, wheezing, noisy breathing and tightness in the chest.  Asthma can be brought on or  triggered  by many things, including dust, mold, pollen, cigarette smoke, exercise, stress and infections (like the common cold).     Generally, every Emergency Department visit should have a follow-up clinic visit with either a primary or a specialty clinic/provider. Please follow-up as instructed by your emergency provider today.    Return to the Emergency Department if:  Your breathing gets worse.  You need to use your inhaler more often than every 4 hours, or cannot get relief from your inhaler.  You are very weak, or feel much more ill.  You develop new symptoms, such as chest pain.  You cough up blood.  You are vomiting (throwing up) enough that you cannot keep fluids or your medicine down.    What can I do to help myself?  Fill any prescriptions the provider gave you and take them right away--especially antibiotics. Be sure to finish the whole antibiotic prescription.  You may be given a prescription for an inhaler, which can help loosen tight air passages.  Use this as needed, but not more often than directed. Inhalers work much better when used with a spacer.   You may be given a prescription for a steroid to reduce inflammation. Used long-term, these can have some side effects, but for short-term use they are safe. You may notice restlessness or increased appetite (eating more).      You may use non-prescription cough or cold medicines. Cough medicines may help, but do not make the cough go away completely.   Avoid smoke, because this can make you feel worse. If you smoke, this may be a good time to  quit! Consider using nicotine lozenges, gum, or patches to reduce cravings.   If you have a fever, Tylenol  (acetaminophen), Motrin  (ibuprofen), or Advil  (ibuprofen), may help bring fever down and may help you feel more comfortable. Be sure to read and follow the package directions, and ask your provider if you have questions.  Be sure to get your flu shot each year.  For certain age groups, the pneumonia shot can help prevent pneumonia.  It is important that you follow up with your regular provider, to be sure that you are improving from this spell (an acute asthma exacerbation), and also to do what you can to keep from having trouble again. Sometimes you need long-term medicines to keep your asthma under control.   If you were given a prescription for medicine here today, be sure to read all of the information (including the package insert) that comes with your prescription.  This will include important information about the medicine, its side effects, and any warnings that you need to know about.  The pharmacist who fills the prescription can provide more information and answer questions you may have about the medicine.  If you have questions or concerns that the pharmacist cannot address, please call or return to the Emergency Department.   Remember that you can always come back to the Emergency Department if you are not able to see your regular provider in the amount of time listed above, if you get any new symptoms, or if there is anything that worries you.

## 2020-10-23 NOTE — ED PROVIDER NOTES
"History     Chief Complaint:  Chest Pain and Shortness of Breath       The history is provided by the patient.     Yakov Cantrell is a 30 year old male with a history of asthma who presents for evaluation of chest pain and shortness of breath which began four days ago. The patient reports that it \"feels like there is someone sitting on his chest\" and he is unable to take a full breath. He has been taking his albuterol inhaler as needed, though this does not offer significant relief. Here, he denies any cough, fever, nausea, vomiting, diarrhea, leg swelling, or known COVID-19 exposure. He does not have any risk factors including hypertension, diabetes, or smoking. He has no family history of DVT or PE or heart disease.       Allergies:  Amoxicillin  Sulfa drugs  Vancomycin    Medications:   Albuterol neb solution  Flexeril  Omeprazole     Medical History:   Asthma  Herpes   ADD    Surgical History   History reviewed. No pertinent past surgical history.     Family History:   Father -  Alcohol/drug abuse  Mother - hyperlipidemia, migraines  Brother(s) -  Asthma, JRA    Social History:  The patient was unaccompanied to the ED.  Smoking Status: Current  Smokeless Tobacco: Never  Alcohol Use: Yes  Drug Use: Yes - marijuana   Marital Status: Single      Review of Systems   Constitutional: Negative for fever.   Respiratory: Positive for shortness of breath. Negative for cough.         Positive for chest pressure   Cardiovascular: Negative for leg swelling.   Gastrointestinal: Negative for diarrhea, nausea and vomiting.   All other systems reviewed and are negative.      Physical Exam     Patient Vitals for the past 24 hrs:   BP Temp Temp src Pulse Resp SpO2 Height Weight   10/23/20 1556 (!) 151/92 -- -- -- -- -- -- --   10/23/20 1550 (!) 151/92 -- -- 78 -- -- -- --   10/23/20 1249 (!) 178/122 98.5  F (36.9  C) Oral 79 16 98 % 1.88 m (6' 2\") 140.6 kg (310 lb)      Physical Exam   General: Alert and interactive. Appears well. " Cooperative and pleasant.   Eyes: The pupils are equal and round. EOMs intact. No scleral icterus.  ENT: No abnormalities to the external nose or ears. Mucous membranes moist. Posterior oropharynx is non-erythematous.  Neck: Trachea is in the midline. No nuchal rigidity.     CV: Regular rate and rhythm. S1 and S2 normal without murmur, click, gallop or rub.   Resp: Breath sounds are clear bilaterally, without rhonchi, wheezes, rales. Non-labored, no retractions or accessory muscle use.     GI: Abdomen is soft without distension. No tenderness to palpation. No peritoneal signs.    MS: Moving all extremities well. Good muscle tone.   Skin: Warm and dry. No rash or lesions noted.  Neuro: Alert and oriented x 3. No focal neurologic deficits. Good strength and sensation in upper and lower extremities. Psych: Awake. Alert.  Normal affect. Appropriate interactions.  Lymph: No anterior or posterior cervical lymphadenopathy noted.    Emergency Department Course     ECG:  ECG taken at 1301  Normal sinus rhythm with sinus arrhythmia  Normal ECG  Rate 78 bpm. PA interval 128 ms. QRS duration 92 ms. QT/QTc 398/453 ms. P-R-T axes 51 18 46.    Imaging:  Radiology results were communicated with the patient who voiced understanding of the findings.    XR Chest Port 1 view:  IMPRESSION: No acute disease.  Reading per radiology.    Laboratory:  Laboratory findings were communicated with the patient who voiced understanding of the findings.    CBC: WBC 9.4, HGB 15.3,   BMP: (Creatinine 0.66) AWNL   Troponin (Collected 1427): <0.015     Symptomatic COVID-19 (Coronavirus) PCR by Nasopharyngeal Swab: Pending     Interventions:   1417 Aspirin 325 mg PO  1417 Prednisone 40 mg PO    Emergency Department Course:    1301 EKG obtained as noted above.     1342 Nursing notes and vitals reviewed.    1347 I performed an exam of the patient as documented above.     1409 The patient was sent for XR while in the emergency department, results  above.     1427 IV was inserted and blood was drawn for laboratory testing, results above.    1542 Patient rechecked and updated.      The patient's nose was swabbed and this sample was sent for COVID testing, findings above.      1547 Findings and plan explained to the Patient. Patient discharged home with instructions regarding supportive care, medications, and reasons to return. The importance of close follow-up was reviewed. The patient was prescribed as below.     Impression & Plan     Medical Decision Making:  Yakov Cantrell is a 30 year old male who presents to the ED for chest pain and pressure with shortness of breath. The workup in the Emergency Department is negative and reassuring. The differential is broad, including ACS, PE, cardiac tamponade, aortic dissection, pneumonia, pneumothorax, pericarditis, esophageal rupture, and others.     EKG shows NSR without signs of ischemia and infarction and troponin negative (greater than 6 hours of constant pressure in chest), and thus I doubt ACS or PE. Patient low risk per Wells and PERC negative, essentially ruling out DVT. Patient's mother has history of renal arterial occlusion but no history of DVT/PE. Additionally, the patient has no signs of tachypnea, tachycardia, or hypoxia. No signs of leukocytosis, anemia, renal dysfunction, electrolyte abnormalities and chest x-ray shows no signs of pneumonia, pneumothorax, or pleural effusions. COVID test pending.     Likely that this is secondary to asthma, as he feels much better after prednisone. Will discharge with prednisone burst, regular albuterol inhalation, and close follow up with PCP. Otherwise, close follow up with PCP indicated. Return here for worsening chest pain, shortness of breath, vomiting, fevers, or other concerns.     Covid-19  Yakov Cantrell was evaluated during a global COVID-19 pandemic, which necessitated consideration that the patient might be at risk for infection with the SARS-CoV-2 virus  that causes COVID-19.   Applicable protocols for evaluation were followed during the patient's care.   COVID-19 was considered as part of the patient's evaluation. The plan for testing is:  a test was obtained during this visit.      Diagnosis:     ICD-10-CM    1. Exacerbation of intermittent asthma, unspecified asthma severity  J45.21    2. Shortness of breath  R06.02    3. Elevated BP without diagnosis of hypertension  R03.0    4. Acute chest pain  R07.9       Disposition:  Discharged to home.    Discharge Medications:  Discharge Medication List as of 10/23/2020  3:57 PM      START taking these medications    Details   predniSONE (DELTASONE) 20 MG tablet Take two tablets (= 40mg) each day for 5 (five) days, Disp-10 tablet, R-0, Local Print           Scribe Disclosure:  I, Alondra Simpson, am serving as a scribe at 1:42 PM on 10/23/2020 to document services personally performed by Stephenie Bolton PA-C based on my observations and the provider's statements to me.      Stephenie Bolton PA-C  10/23/20 1700

## 2020-10-24 LAB
INTERPRETATION ECG - MUSE: NORMAL
SARS-COV-2 RNA SPEC QL NAA+PROBE: NOT DETECTED
SPECIMEN SOURCE: NORMAL

## 2020-11-06 ENCOUNTER — OFFICE VISIT (OUTPATIENT)
Dept: FAMILY MEDICINE | Facility: CLINIC | Age: 30
End: 2020-11-06
Payer: COMMERCIAL

## 2020-11-06 VITALS
HEART RATE: 80 BPM | BODY MASS INDEX: 40.83 KG/M2 | SYSTOLIC BLOOD PRESSURE: 140 MMHG | RESPIRATION RATE: 18 BRPM | TEMPERATURE: 98.3 F | OXYGEN SATURATION: 97 % | DIASTOLIC BLOOD PRESSURE: 78 MMHG | WEIGHT: 315 LBS

## 2020-11-06 DIAGNOSIS — J45.30 MILD PERSISTENT ASTHMA, UNSPECIFIED WHETHER COMPLICATED: Primary | ICD-10-CM

## 2020-11-06 DIAGNOSIS — F41.9 ANXIETY: ICD-10-CM

## 2020-11-06 DIAGNOSIS — L40.9 PSORIASIS: ICD-10-CM

## 2020-11-06 DIAGNOSIS — E66.01 MORBID OBESITY (H): ICD-10-CM

## 2020-11-06 PROCEDURE — 99214 OFFICE O/P EST MOD 30 MIN: CPT | Performed by: FAMILY MEDICINE

## 2020-11-06 RX ORDER — HYDROXYZINE HYDROCHLORIDE 25 MG/1
25 TABLET, FILM COATED ORAL EVERY 6 HOURS PRN
Qty: 30 TABLET | Refills: 1 | Status: SHIPPED | OUTPATIENT
Start: 2020-11-06 | End: 2022-09-09

## 2020-11-06 RX ORDER — FLUTICASONE PROPIONATE 110 UG/1
1 AEROSOL, METERED RESPIRATORY (INHALATION) 2 TIMES DAILY
Qty: 2 INHALER | Refills: 1 | Status: SHIPPED | OUTPATIENT
Start: 2020-11-06 | End: 2021-12-10

## 2020-11-06 RX ORDER — TRIAMCINOLONE ACETONIDE 1 MG/G
CREAM TOPICAL 2 TIMES DAILY
Qty: 80 G | Refills: 2 | Status: SHIPPED | OUTPATIENT
Start: 2020-11-06 | End: 2022-03-02

## 2020-11-06 NOTE — PROGRESS NOTES
Subjective     Yakov Cantrell is a 30 year old male who presents to clinic today for the following health issues:    History of Present Illness     Asthma:  He presents for follow up of asthma.  He has some cough, some wheezing, and no shortness of breath. He is using a relief medication 2-3 times per day. He does not have a controller medication. Patient is aware of the following triggers: unaware of any triggers. The patient has had a visit to the Emergency Room, Urgent Care or Hospital due to asthma since the last clinic visit. He has been to the Emergency Room or Urgent Care 1 time.He has had a Hospitalization 0 times.            ED/UC Followup:    Facility:  [facility]   Date of visit: 10/23/2020  Reason for visit: chest pain/ shortness of breath   Current Status: improving        Asthma Follow-Up    Was ACT completed today?    Yes    ACT Total Scores 11/6/2020   ACT TOTAL SCORE (Goal Greater than or Equal to 20) 13   In the past 12 months, how many times did you visit the emergency room for your asthma without being admitted to the hospital? 1   In the past 12 months, how many times were you hospitalized overnight because of your asthma? 0       How many days per week do you miss taking your asthma controller medication?  0    Please describe any recent triggers for your asthma: pollens and allergies    Have you had any Emergency Room Visits, Urgent Care Visits, or Hospital Admissions since your last office visit?  Yes  Number of ER or Urgent Care visits for asthma: asthma exacerbation        Review of Systems   CONSTITUTIONAL: NEGATIVE for fever, chills, change in weight  ENT/MOUTH: NEGATIVE for ear, mouth and throat problems      Objective    There were no vitals taken for this visit.  There is no height or weight on file to calculate BMI.  Physical Exam   GENERAL: healthy, alert and no distress  HENT: ear canals and TM's normal, nose and mouth without ulcers or lesions  NECK: no adenopathy, no  "asymmetry, masses, or scars and thyroid normal to palpation  RESP: lungs clear to auscultation - no rales, rhonchi or wheezes  CV: regular rate and rhythm, normal S1 S2, no S3 or S4, no murmur, click or rub, no peripheral edema and peripheral pulses strong  MS: no gross musculoskeletal defects noted, no edema            Assessment & Plan     Mild persistent asthma, unspecified whether complicated  Given his symptoms I think it is reasonable to start on daily steroids inhaler with   - fluticasone (FLOVENT HFA) 110 MCG/ACT inhaler; Inhale 1 puff into the lungs 2 times daily  Then recheck in 1 month    Anxiety  I think anxiety is playing a big role with patient's symptoms, especially his tightness in chest, chest pain and occasional palpation, will start on  - hydrOXYzine (ATARAX) 25 MG tablet; Take 1 tablet (25 mg) by mouth every 6 hours as needed for itching  Reassurance given today about his health, encourage smoking cessation.    Morbid obesity (H)  Today I talked to the patient about diet, regular exercise.      Psoriasis  On the hands, start on   - triamcinolone (KENALOG) 0.1 % external cream; Apply topically 2 times daily     Tobacco Cessation:   reports that he has been smoking. He has never used smokeless tobacco.  Tobacco Cessation Action Plan: Self help information given to patient      BMI:   Estimated body mass index is 40.83 kg/m  as calculated from the following:    Height as of 10/23/20: 1.88 m (6' 2\").    Weight as of this encounter: 144.2 kg (318 lb).   Weight management plan: Discussed healthy diet and exercise guidelines          Return in about 4 weeks (around 12/4/2020) for Routine physical..    Dalia Stark MD  [facility]"

## 2020-11-07 ASSESSMENT — ASTHMA QUESTIONNAIRES: ACT_TOTALSCORE: 13

## 2020-12-24 ENCOUNTER — TELEPHONE (OUTPATIENT)
Dept: FAMILY MEDICINE | Facility: CLINIC | Age: 30
End: 2020-12-24

## 2020-12-24 NOTE — LETTER
[facility]  [address] [address]  [phone]  December 24, 2020    Yakov Cantrell  [address]  [place] [address]    Dear Yakov,    I care about your health and have reviewed your health plan. I have reviewed your medical conditions, medication list, and lab results and am making recommendations based on this review, to better manage your health.    You are in particular need of attention regarding:  -Asthma    I am recommending that you:  {recommendations: -Complete and return the attached ASTHMA CONTROL TEST.  If your total score is 19 or less or you have been to the ER or urgent care for your asthma, then please schedule an asthma followup appointment.    Here is a list of Health Maintenance topics that are due now or due soon:  Health Maintenance Due   Topic Date Due     PREVENTIVE CARE VISIT  1989     Pneumococcal Vaccine: Pediatrics (0 to 5 Years) and At-Risk Patients (6 to 64 Years) (1 of 1 - PPSV23) 07/03/1995     HIV SCREENING  07/03/2004     HEPATITIS C SCREENING  07/03/2007       Please call us at [phone] (or use [employer]) to address the above recommendations.     Thank you for trusting [facility] and we appreciate the opportunity to serve you.  We look forward to supporting your healthcare needs in the future.    Healthy Regards,    Dalia Stark MD

## 2021-01-10 ENCOUNTER — HEALTH MAINTENANCE LETTER (OUTPATIENT)
Age: 31
End: 2021-01-10

## 2021-03-26 ENCOUNTER — DOCUMENTATION ONLY (OUTPATIENT)
Dept: FAMILY MEDICINE | Facility: CLINIC | Age: 31
End: 2021-03-26

## 2021-03-26 NOTE — PROGRESS NOTES
RN chart review     Per Dr. Stark list patient should be listed as SB #2 and is currently listed as SB #3     RN changed to SB # 2 per pcp     Hoda Rodrigues, Registered Nurse, PAL (Patient Advocate Liason)   [facility]   [phone]

## 2021-04-08 ENCOUNTER — TELEPHONE (OUTPATIENT)
Dept: FAMILY MEDICINE | Facility: CLINIC | Age: 31
End: 2021-04-08

## 2021-04-08 NOTE — TELEPHONE ENCOUNTER
Patient Quality Outreach Summary      Summary:    Patient is due/failing the following:   ACT needed    Type of outreach:    Sent [employer] message.    Questions for provider review:    None                                                                                                                    NAHID Mejia       Chart routed to Provider.

## 2021-04-14 ENCOUNTER — TELEPHONE (OUTPATIENT)
Dept: FAMILY MEDICINE | Facility: CLINIC | Age: 31
End: 2021-04-14

## 2021-04-14 NOTE — LETTER
[facility]  [address] [facility] [address]  [phone]  April 14, 2021    Yakov Cantrell  [address]  [place] [address]    Dear Yakov,    I care about your health and have reviewed your health plan. I have reviewed your medical conditions, medication list, and lab results and am making recommendations based on this review, to better manage your health.    You are in particular need of attention regarding:  -Asthma    I am recommending that you:  {recommendations: -Complete and return the attached ASTHMA CONTROL TEST.  If your total score is 19 or less or you have been to the ER or urgent care for your asthma, then please schedule an asthma followup appointment.    Here is a list of Health Maintenance topics that are due now or due soon:  Health Maintenance Due   Topic Date Due     PREVENTIVE CARE VISIT  Never done     ADVANCE CARE PLANNING  Never done     Pneumococcal Vaccine: Pediatrics (0 to 5 Years) and At-Risk Patients (6 to 64 Years) (1 of 2 - PPSV23) Never done     HIV SCREENING  Never done     COVID-19 Vaccine (1) Never done     HEPATITIS C SCREENING  Never done     PHQ-2  01/01/2021     ASTHMA CONTROL TEST  05/06/2021     Please call us at [phone] (or use [email or web address]) to address the above recommendations.     Thank you for trusting [facility] and we appreciate the opportunity to serve you.  We look forward to supporting your healthcare needs in the future.    Healthy Regards,    Daila Stark MD

## 2021-04-14 NOTE — TELEPHONE ENCOUNTER
Patient Quality Outreach      Summary:    Patient has the following on his problem list/HM:   Asthma review       ACT Total Scores 11/6/2020   ACT TOTAL SCORE (Goal Greater than or Equal to 20) 13   In the past 12 months, how many times did you visit the emergency room for your asthma without being admitted to the hospital? 1   In the past 12 months, how many times were you hospitalized overnight because of your asthma? 0          Patient is due/failing the following:   ACT needed    Type of outreach:    Sent letter. and Copy of ACT mailed to patient.    Questions for provider review:    None                                                                                                                                     Regina George CMA       Chart routed to Care Team.

## 2021-05-25 ENCOUNTER — OFFICE VISIT (OUTPATIENT)
Dept: FAMILY MEDICINE | Facility: CLINIC | Age: 31
End: 2021-05-25
Payer: COMMERCIAL

## 2021-05-25 ENCOUNTER — IMMUNIZATION (OUTPATIENT)
Dept: NURSING | Facility: CLINIC | Age: 31
End: 2021-05-25
Payer: COMMERCIAL

## 2021-05-25 VITALS
BODY MASS INDEX: 40.11 KG/M2 | WEIGHT: 312.4 LBS | RESPIRATION RATE: 16 BRPM | SYSTOLIC BLOOD PRESSURE: 150 MMHG | OXYGEN SATURATION: 97 % | DIASTOLIC BLOOD PRESSURE: 100 MMHG | HEART RATE: 98 BPM | TEMPERATURE: 98.3 F

## 2021-05-25 DIAGNOSIS — Z11.59 NEED FOR HEPATITIS C SCREENING TEST: ICD-10-CM

## 2021-05-25 DIAGNOSIS — L72.3 SEBACEOUS CYST: Primary | ICD-10-CM

## 2021-05-25 DIAGNOSIS — Z11.4 SCREENING FOR HIV (HUMAN IMMUNODEFICIENCY VIRUS): ICD-10-CM

## 2021-05-25 DIAGNOSIS — R07.89 ATYPICAL CHEST PAIN: ICD-10-CM

## 2021-05-25 PROCEDURE — 0001A PR COVID VAC PFIZER DIL RECON 30 MCG/0.3 ML IM: CPT

## 2021-05-25 PROCEDURE — 91300 PR COVID VAC PFIZER DIL RECON 30 MCG/0.3 ML IM: CPT

## 2021-05-25 PROCEDURE — 99214 OFFICE O/P EST MOD 30 MIN: CPT | Performed by: FAMILY MEDICINE

## 2021-05-25 ASSESSMENT — ASTHMA QUESTIONNAIRES
ACUTE_EXACERBATION_TODAY: NO
QUESTION_3 LAST FOUR WEEKS HOW OFTEN DID YOUR ASTHMA SYMPTOMS (WHEEZING, COUGHING, SHORTNESS OF BREATH, CHEST TIGHTNESS OR PAIN) WAKE YOU UP AT NIGHT OR EARLIER THAN USUAL IN THE MORNING: NOT AT ALL
QUESTION_2 LAST FOUR WEEKS HOW OFTEN HAVE YOU HAD SHORTNESS OF BREATH: ONCE OR TWICE A WEEK
ACT_TOTALSCORE: 20
EMERGENCY_ROOM_LAST_YEAR_TOTAL: ONE
QUESTION_4 LAST FOUR WEEKS HOW OFTEN HAVE YOU USED YOUR RESCUE INHALER OR NEBULIZER MEDICATION (SUCH AS ALBUTEROL): ONCE A WEEK OR LESS
QUESTION_5 LAST FOUR WEEKS HOW WOULD YOU RATE YOUR ASTHMA CONTROL: SOMEWHAT CONTROLLED
QUESTION_1 LAST FOUR WEEKS HOW MUCH OF THE TIME DID YOUR ASTHMA KEEP YOU FROM GETTING AS MUCH DONE AT WORK, SCHOOL OR AT HOME: A LITTLE OF THE TIME

## 2021-05-25 NOTE — PROGRESS NOTES
"    Assessment & Plan     Screening for HIV (human immunodeficiency virus)      Need for hepatitis C screening test  Will order the test for next week.     Sebaceous cyst  Reassurance given, no need for removal given asymptomatic     Atypical chest pain  Related to anxiety, discussed anxiety symptoms, and ways to improve. It    Today I counseled the patient about diet, regular exercise and weight loss planning.  Recheck BP in 1 week.               BMI:   Estimated body mass index is 40.11 kg/m  as calculated from the following:    Height as of 10/23/20: 1.88 m (6' 2\").    Weight as of this encounter: 141.7 kg (312 lb 6.4 oz).   Weight management plan: Discussed healthy diet and exercise guidelines        Return in about 2 weeks (around 6/8/2021).    Dalia Stark MD  [facility]    Noelle Nagy is a 30 year old who presents for the following health issues     HPI     Patient states that he has had a lump on his neck for about 2 to 3 weeks. Patient states that there is no pain. The lump was hard and was not moveable.  There is also another lesion on the abdomen that is slightly large and painful.    Patient would like to talk about the covid shot.    Pt continues to have occasional chest tightness, and skipped beats, along with pain on the Lt side of the chest that has been investigated in the past, usually sharp pain, comes and goes, last for few seconds then go away, not related to exercise.    Review of Systems   CONSTITUTIONAL: NEGATIVE for fever, chills, change in weight  ENT/MOUTH: NEGATIVE for ear, mouth and throat problems      Objective    BP (!) 150/100 (BP Location: Right arm, Patient Position: Sitting, Cuff Size: Adult Large)   Pulse 98   Temp 98.3  F (36.8  C) (Oral)   Resp 16   Wt 141.7 kg (312 lb 6.4 oz)   SpO2 97%   BMI 40.11 kg/m    Body mass index is 40.11 kg/m .  Physical Exam   GENERAL: healthy, alert and no distress  RESP: lungs clear to auscultation - no " rales, rhonchi or wheezes  CV: regular rate and rhythm, normal S1 S2, no S3 or S4, no murmur, click or rub, no peripheral edema and peripheral pulses strong  SKIN: there is a small round cyest on the abdomen, about 5 mm in size, round, free, mobile, non tender.

## 2021-05-26 ASSESSMENT — ASTHMA QUESTIONNAIRES: ACT_TOTALSCORE: 20

## 2021-06-16 ENCOUNTER — IMMUNIZATION (OUTPATIENT)
Dept: NURSING | Facility: CLINIC | Age: 31
End: 2021-06-16
Attending: INTERNAL MEDICINE
Payer: COMMERCIAL

## 2021-06-16 PROCEDURE — 0002A PR COVID VAC PFIZER DIL RECON 30 MCG/0.3 ML IM: CPT

## 2021-06-16 PROCEDURE — 91300 PR COVID VAC PFIZER DIL RECON 30 MCG/0.3 ML IM: CPT

## 2021-10-23 ENCOUNTER — HEALTH MAINTENANCE LETTER (OUTPATIENT)
Age: 31
End: 2021-10-23

## 2021-12-10 DIAGNOSIS — J45.30 MILD PERSISTENT ASTHMA, UNSPECIFIED WHETHER COMPLICATED: ICD-10-CM
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
## 2021-12-11 ENCOUNTER — TELEPHONE (OUTPATIENT)
Dept: NURSING | Facility: CLINIC | Age: 31
End: 2021-12-11
Payer: COMMERCIAL

## 2021-12-11 DIAGNOSIS — J45.30 MILD PERSISTENT ASTHMA, UNSPECIFIED WHETHER COMPLICATED: Primary | ICD-10-CM

## 2021-12-11 RX ORDER — FLUTICASONE PROPIONATE 110 UG/1
1 AEROSOL, METERED RESPIRATORY (INHALATION) 2 TIMES DAILY
Qty: 2 G | Refills: 0 | Status: SHIPPED | OUTPATIENT
Start: 2021-12-11 | End: 2023-03-01

## 2021-12-11 RX ORDER — ALBUTEROL SULFATE 0.83 MG/ML
SOLUTION RESPIRATORY (INHALATION)
Qty: 90 ML | Refills: 1 | Status: SHIPPED | OUTPATIENT
Start: 2021-12-11 | End: 2023-03-01

## 2021-12-11 NOTE — TELEPHONE ENCOUNTER
Order request:     Pt calling for an order for an Albuterol Inhaler. Pt has an albuterol nebulizer order and a Flovent inhaler, but the flovent inhaler is on back order and patient would still like an albuterol inhaler order.    Pt is asking that a message be routed to his PCP to write an order for him for: Albuterol inhaler    Writer is routing a message to patient's PCP.    Jacquelyn Siddiqi RN  [facility] Nurse Advisor 12:43 PM 12/11/2021

## 2021-12-13 RX ORDER — ALBUTEROL SULFATE 90 UG/1
2 AEROSOL, METERED RESPIRATORY (INHALATION) EVERY 6 HOURS
Qty: 18 G | Refills: 3 | Status: SHIPPED | OUTPATIENT
Start: 2021-12-13 | End: 2023-10-23

## 2021-12-13 NOTE — TELEPHONE ENCOUNTER
Called pt, informed, agrees, also due for asthma appointment, agrees to follow up next year for asthma appointment  Sybil Walls RN, BSN  Message handled by CLINIC NURSE.

## 2021-12-13 NOTE — TELEPHONE ENCOUNTER
Of course, sent. But please make sure he gets back on his flovent as soon as he gets it, for it is the mainstay of treatment for his asthma.

## 2022-02-12 ENCOUNTER — HEALTH MAINTENANCE LETTER (OUTPATIENT)
Age: 32
End: 2022-02-12

## 2022-03-02 ENCOUNTER — OFFICE VISIT (OUTPATIENT)
Dept: FAMILY MEDICINE | Facility: CLINIC | Age: 32
End: 2022-03-02
Payer: COMMERCIAL

## 2022-03-02 VITALS
RESPIRATION RATE: 16 BRPM | HEART RATE: 96 BPM | DIASTOLIC BLOOD PRESSURE: 92 MMHG | BODY MASS INDEX: 38.79 KG/M2 | TEMPERATURE: 98.5 F | HEIGHT: 75 IN | OXYGEN SATURATION: 98 % | WEIGHT: 312 LBS | SYSTOLIC BLOOD PRESSURE: 144 MMHG

## 2022-03-02 DIAGNOSIS — I10 MILD HYPERTENSION: ICD-10-CM

## 2022-03-02 DIAGNOSIS — B35.3 TINEA PEDIS OF RIGHT FOOT: ICD-10-CM

## 2022-03-02 PROCEDURE — 99213 OFFICE O/P EST LOW 20 MIN: CPT | Performed by: FAMILY MEDICINE

## 2022-03-02 RX ORDER — PRENATAL VIT 91/IRON/FOLIC/DHA 28-975-200
COMBINATION PACKAGE (EA) ORAL 2 TIMES DAILY
Qty: 30 G | Refills: 1 | Status: SHIPPED | OUTPATIENT
Start: 2022-03-02 | End: 2022-03-16

## 2022-03-02 ASSESSMENT — ASTHMA QUESTIONNAIRES: ACT_TOTALSCORE: 21

## 2022-03-02 NOTE — PATIENT INSTRUCTIONS
Patient Education     What Is High Blood Pressure?  High blood pressure (hypertension) is known as the  silent killer.  This is because most of the time it doesn t cause symptoms. In fact, many people don t know they have it until other problems develop. In most cases, high blood pressure often requires lifelong treatment.  Understanding blood pressure  The circulatory system is made up of the heart and blood vessels that carry blood through the body. Your heart is the pump for this system. With each heartbeat (contraction), the heart sends blood out through large blood vessels called arteries. Blood pressure is a measure of how hard the moving blood pushes against the walls of the arteries.  High blood pressure can harm your health  In a healthy blood vessel, the blood moves smoothly through the vessel and puts normal pressure on the vessel walls.    High blood pressure occurs when blood pushes too hard against artery walls. This causes damage to the artery walls and then the formation of scar tissue as it heals. This makes the arteries stiff and weak. Plaque sticks to the scarred tissue narrowing and hardening the arteries. High blood pressure also causes your heart to work harder to get blood out to the body. High blood pressure raises your risk of heart attack, also known as acute myocardial infarction, or AMI, heart failure, and stroke. It can also lead to kidney disease, and blindness. In general, if you have high blood pressure, keeping your blood pressure below 130/80 mmHg may help prevent these problems. Your healthcare provider may prescribe medicine to help control blood pressure if lifestyle changes are not enough.  It's important to know your blood pressure numbers. Blood pressure measurements are given as 2 numbers. Systolic blood pressure is the upper number. This is the pressure when the heart contracts. Diastolic blood pressure is the lower number. This is the pressure when the heart relaxes  "between beats.  Blood pressure is categorized as normal, elevated, or stage 1 or stage 2 high blood pressure:    Normal blood pressure is systolic of less than 120 and diastolic of less than 80 (120/80)    Elevated blood pressure is systolic of 120 to 129 and diastolic less than 80    Stage 1 high blood pressure is systolic is 130 to 139 or diastolic between 80 to 89    Stage 2 high blood pressure is when systolic is 140 or higher or the diastolic is 90 or higher  High blood pressure is diagnosed when multiple, separate readings show blood pressure above 130/80 mmHg. Talk with your healthcare provider if you have questions or concerns about your blood pressure readings.  Measuring blood pressure  An example of a blood pressure measurement is 120/70. The top number is the pressure of blood against the artery walls during a heartbeat (systolic). The bottom number is the pressure of blood against artery walls between heartbeats (diastolic). Talk with your healthcare provider to find out what your blood pressure goals should be.   Controlling blood pressure  If your blood pressure is too high, work with your doctor on a plan for lowering it. Below are steps you can take that will help lower your blood pressure.    Choose heart-healthy foods. Eating healthier meals helps you control your blood pressure. Ask your doctor about the DASH eating plan. This plan helps reduce blood pressure by limiting the amount of sodium (salt) you have in your diet. DASH also encourages eating plenty of fruits and vegetables, low-fat or non-fat dairy, whole-grains, and foods high in fiber, and low in fat. This also provides an enhanced amount of potassium which can also help lower blood pressure.    Reduce sodium. Reducing sodium in your diet reduces fluid retention. Fluid retention caused by too much salt increases blood volume and blood pressure. The American Heart Association (AHA) advises an \"ideal\" amount of sodium: no more than 1,500 " mg a day.  But because Americans eat so much salt, the AHA says a positive change can occur by cutting back to even 2,300 mg a day.    Stay at a healthy weight. Being overweight makes you more likely to have high blood pressure. Losing excess weight helps lower blood pressure.    Exercise regularly. Daily exercise helps your heart and blood vessels work better and stay healthier. It can help lower your blood pressure.    Stop smoking. Smoking increases blood pressure and damages blood vessels.    Limit alcohol. Drinking too much alcohol can raise blood pressure. Men should have no more than 2 drinks a day. Women should have no more than 1. A drink is equal to 1 beer, or a small glass of wine, or a shot of liquor.    Control stress. Stress makes your heart work harder and beat faster. Controlling stress helps you control your blood pressure.  Facts about high blood pressure    Feeling OK does not mean your blood pressure is under control. Likewise, feeling bad doesn t mean it s out of control. The only way to know for sure is to check your pressure regularly.    Medicine is only one part of controlling high blood pressure. You also need to manage your weight, get regular exercise, and adjust your eating habits.    High blood pressure is usually a lifelong problem. But it can be controlled with healthy lifestyle changes and medicine.    Hypertension is not the same as stress. Although stress may be a factor in high blood pressure, it s only one part of the story.    Blood pressure medicines need to be taken every day. Stopping suddenly may cause a dangerous increase in pressure.    [employer] last reviewed this educational content on 4/1/2019 2000-2021 [employer]. All rights reserved. This information is not intended as a substitute for professional medical care. Always follow your healthcare professional's instructions.           Patient Education     Controlling High Blood Pressure   High blood  pressure (hypertension) is often called the silent killer. This is because many people who have it, don t know it. It can be very dangerous. High blood pressure can raise your risk of heart attack, stroke, heart disease, and heart failure. Controlling your blood pressure can decrease your risk of these problems. It's important to know the appropriate blood pressure range and remember to check your blood pressure regularly. Doing so can save your life.   Blood pressure measurements are given as 2 numbers. Systolic blood pressure is the upper number. This is the pressure when the heart contracts. Diastolic blood pressure is the lower number. This is the pressure when the heart relaxes between beats.   Blood pressure is categorized as normal, elevated, or stage 1 or stage 2 high blood pressure:     Normal blood pressure is systolic of less than 120 and diastolic of less than 80 (120/80)    Elevated blood pressure is systolic of 120 to 129 and diastolic less than 80    Stage 1 high blood pressure is systolic of 130 to 139 or diastolic between 80 to 89    Stage 2 high blood pressure is when systolic is 140 or higher or the diastolic is 90 or higher  A heart-healthy lifestyle can help you control your blood pressure without medicines. Here are some things you can do to pursue a heart-healthy lifestyle:     Choose heart-healthy foods     Select low-salt, low-fat foods. Limit sodium intake to 2,400 mg per day or the amount suggested by your healthcare provider.    Limit canned, dried, cured, packaged, and fast foods. These can contain a lot of salt.    Eat 8 to 10 servings of fruits and vegetables every day.    Choose lean meats, fish, or chicken.    Eat whole-grain pasta, brown rice, and beans.    Eat 2 to 3 servings of low-fat or fat-free dairy products.    Ask your doctor about the DASH eating plan. This plan helps reduce blood pressure.    When you go to a restaurant, ask that your meal be prepared with no added  salt.    Stay at a healthy weight     Ask your healthcare provider how many calories to eat a day. Then stick to that number.    Ask your healthcare provider what weight range is healthiest for you. If you are overweight, a weight loss of only 3% to 5% of your body weight can help lower blood pressure. Generally, a good weight loss goal is to lose 10% of your body weight in a year.    Limit snacks and sweets.    Get regular exercise.    Get up and get active     Find activities you enjoy that can be done alone or with friends or family. Such activities might include bicycling, dancing, walking, or jogging.    Park farther away from building entrances to walk more.    Use stairs instead of the elevator.    When you can, walk or bike instead of driving.    [place] leaves, garden, or do household repairs.    Be active at a moderate to vigorous level of physical activity for at least 40 minutes for a minimum of 3 to 4 days a week.     Manage stress     Make time to relax and enjoy life. Find time to laugh.    Communicate your concerns with your loved ones and your healthcare provider.    Visit with family and friends, and keep up with hobbies.    Limit alcohol and quit smoking     Men should have no more than 2 drinks per day.    Women should have no more than 1 drink per day.    Talk with your healthcare provider about quitting smoking. Smoking significantly increases your risk for heart disease and stroke. Ask your healthcare provider about community smoking cessation programs and other options.    Medicines  If lifestyle changes aren t enough, your healthcare provider may prescribe high blood pressure medicine. Take all medicines as prescribed. If you have any questions about your medicines, ask your healthcare provider before stopping or changing them.   [employer] last reviewed this educational content on 6/1/2019 2000-2021 [employer]. All rights reserved. This information is not intended as a  substitute for professional medical care. Always follow your healthcare professional's instructions.           Patient Education     Athlete s Foot     Athlete s foot (tinea pedis) is caused by a fungal infection in the skin. It affects the skin between the toes, causing cracks in the skin called fissures. It can also affect the bottom of the foot where it causes dry white scales and peeling of the skin. This infection is more likely to occur when the foot is in hot, sweaty socks and shoes for long periods of time. You may feel itching and burning between your toes. This infection is treated with skin creams or medicine taken by mouth.  Home care  The following are general care guidelines:    It's important to keep the feet dry. Use absorbent cotton socks and change them if they become sweaty. Or wear an open-toe shoe or sandal. Wash the feet at least once a day with soap and water.    Apply the antifungal cream as prescribed. Some antifungal creams are available without a prescription.    It may take a week before the rash starts to improve. It can take about 3 to 4 weeks to completely clear. Continue the medicine until the rash is all gone.    Use over-the-counter antifungal powders or sprays on your feet after exposure to high-risk environments, such as public showers, gyms, and locker rooms. This can help prevent future infections. Wearing appropriate shoes in these situations can help.  Prevention  These tips may help prevent athlete s foot:    Don't share shoes or socks with someone who has athlete's foot.    Don't walk barefoot in places where a fungal infection can spread quickly such as locker rooms, showers, and swimming pools.    Change your socks regularly.    Alternate shoes to help with drying.  Follow-up care  Follow up with your healthcare provider as recommended if the rash doesn't improve after 10 days of treatment, or if the rash continues to spread.  When to seek medical care  Get medical  attention right away if any of the following occur:    Fever of 100.4 F (38 C) or higher, or as directed    Increasing redness or swelling of the foot    Infection comes back soon after treatment    Pus draining from cracks in the skin  [employer] last reviewed this educational content on 7/1/2019 2000-2021 [employer]. All rights reserved. This information is not intended as a substitute for professional medical care. Always follow your healthcare professional's instructions.

## 2022-08-25 ENCOUNTER — HOSPITAL ENCOUNTER (EMERGENCY)
Facility: CLINIC | Age: 32
Discharge: HOME | End: 2022-08-25
Attending: EMERGENCY MEDICINE | Admitting: EMERGENCY MEDICINE
Payer: COMMERCIAL

## 2022-08-25 ENCOUNTER — NURSE TRIAGE (OUTPATIENT)
Dept: NURSING | Facility: CLINIC | Age: 32
End: 2022-08-25

## 2022-08-25 ENCOUNTER — APPOINTMENT (OUTPATIENT)
Dept: GENERAL RADIOLOGY | Facility: CLINIC | Age: 32
End: 2022-08-25
Attending: EMERGENCY MEDICINE
Payer: COMMERCIAL

## 2022-08-25 VITALS
SYSTOLIC BLOOD PRESSURE: 163 MMHG | OXYGEN SATURATION: 97 % | DIASTOLIC BLOOD PRESSURE: 93 MMHG | RESPIRATION RATE: 18 BRPM | HEART RATE: 81 BPM

## 2022-08-25 DIAGNOSIS — R03.0 ELEVATED BLOOD PRESSURE READING WITHOUT DIAGNOSIS OF HYPERTENSION: ICD-10-CM

## 2022-08-25 DIAGNOSIS — R07.9 ACUTE CHEST PAIN: ICD-10-CM

## 2022-08-25 LAB
ANION GAP SERPL CALCULATED.3IONS-SCNC: 10 MMOL/L (ref 7–15)
ATRIAL RATE - MUSE: 63 BPM
BASOPHILS # BLD AUTO: 0.1 10E3/UL (ref 0–0.2)
BASOPHILS NFR BLD AUTO: 1 %
BUN SERPL-MCNC: 9.6 MG/DL (ref 6–20)
CALCIUM SERPL-MCNC: 9.6 MG/DL (ref 8.6–10)
CHLORIDE SERPL-SCNC: 104 MMOL/L (ref 98–107)
CREAT SERPL-MCNC: 0.7 MG/DL (ref 0.67–1.17)
DEPRECATED HCO3 PLAS-SCNC: 26 MMOL/L (ref 22–29)
DIASTOLIC BLOOD PRESSURE - MUSE: NORMAL MMHG
EOSINOPHIL # BLD AUTO: 0.3 10E3/UL (ref 0–0.7)
EOSINOPHIL NFR BLD AUTO: 3 %
ERYTHROCYTE [DISTWIDTH] IN BLOOD BY AUTOMATED COUNT: 12.8 % (ref 10–15)
GFR SERPL CREATININE-BSD FRML MDRD: >90 ML/MIN/1.73M2
GLUCOSE SERPL-MCNC: 128 MG/DL (ref 70–99)
HCT VFR BLD AUTO: 47.2 % (ref 40–53)
HGB BLD-MCNC: 14.8 G/DL (ref 13.3–17.7)
HOLD SPECIMEN: NORMAL
HOLD SPECIMEN: NORMAL
IMM GRANULOCYTES # BLD: 0.1 10E3/UL
IMM GRANULOCYTES NFR BLD: 1 %
INTERPRETATION ECG - MUSE: NORMAL
LYMPHOCYTES # BLD AUTO: 1.8 10E3/UL (ref 0.8–5.3)
LYMPHOCYTES NFR BLD AUTO: 21 %
MCH RBC QN AUTO: 27.1 PG (ref 26.5–33)
MCHC RBC AUTO-ENTMCNC: 31.4 G/DL (ref 31.5–36.5)
MCV RBC AUTO: 86 FL (ref 78–100)
MONOCYTES # BLD AUTO: 0.8 10E3/UL (ref 0–1.3)
MONOCYTES NFR BLD AUTO: 9 %
NEUTROPHILS # BLD AUTO: 5.7 10E3/UL (ref 1.6–8.3)
NEUTROPHILS NFR BLD AUTO: 65 %
NRBC # BLD AUTO: 0 10E3/UL
NRBC BLD AUTO-RTO: 0 /100
P AXIS - MUSE: 35 DEGREES
PLATELET # BLD AUTO: 338 10E3/UL (ref 150–450)
POTASSIUM SERPL-SCNC: 4.2 MMOL/L (ref 3.4–5.3)
PR INTERVAL - MUSE: 130 MS
QRS DURATION - MUSE: 96 MS
QT - MUSE: 412 MS
QTC - MUSE: 421 MS
R AXIS - MUSE: 17 DEGREES
RBC # BLD AUTO: 5.46 10E6/UL (ref 4.4–5.9)
SODIUM SERPL-SCNC: 140 MMOL/L (ref 136–145)
SYSTOLIC BLOOD PRESSURE - MUSE: NORMAL MMHG
T AXIS - MUSE: 43 DEGREES
TROPONIN T SERPL HS-MCNC: 8 NG/L
VENTRICULAR RATE- MUSE: 63 BPM
WBC # BLD AUTO: 8.8 10E3/UL (ref 4–11)

## 2022-08-25 PROCEDURE — 36415 COLL VENOUS BLD VENIPUNCTURE: CPT | Performed by: EMERGENCY MEDICINE

## 2022-08-25 PROCEDURE — 84484 ASSAY OF TROPONIN QUANT: CPT | Performed by: EMERGENCY MEDICINE

## 2022-08-25 PROCEDURE — 85004 AUTOMATED DIFF WBC COUNT: CPT | Performed by: EMERGENCY MEDICINE

## 2022-08-25 PROCEDURE — 99285 EMERGENCY DEPT VISIT HI MDM: CPT | Mod: 25

## 2022-08-25 PROCEDURE — 71046 X-RAY EXAM CHEST 2 VIEWS: CPT

## 2022-08-25 PROCEDURE — 82310 ASSAY OF CALCIUM: CPT | Performed by: EMERGENCY MEDICINE

## 2022-08-25 PROCEDURE — 85025 COMPLETE CBC W/AUTO DIFF WBC: CPT | Performed by: EMERGENCY MEDICINE

## 2022-08-25 PROCEDURE — 93005 ELECTROCARDIOGRAM TRACING: CPT

## 2022-08-25 ASSESSMENT — ENCOUNTER SYMPTOMS
NAUSEA: 0
FEVER: 0
COUGH: 0
PALPITATIONS: 0

## 2022-08-25 NOTE — ED PROVIDER NOTES
History   Chief Complaint:  Chest Pain       The history is provided by the patient.      Yakov Cantrell is a 32 year old male with history of morbid obesity and asthma who presents with chest pain. 2 days ago, he was watching a movie when he developed chest pain and had an elevated blood pressure. He describes the pain as a tightness. The pain eventually resolved on its own after a couple hours. Then after eating dinner last night, he had another episode of symptoms that lasted a couple hours as well. He called his primary care office earlier today to make an appointment, but he was pain at the time so they advised him to go to the ED. He does not have a history of blood clots or cancer. He endorses occasional cigarette use (~once per month) and marijuana use. He denies nausea, diaphoresis, shortness of breath, palpitations, leg swelling/pain, hemoptysis, fever, cough, or rash.     Review of Systems   Constitutional: Negative for fever.   Respiratory: Negative for cough.    Cardiovascular: Positive for chest pain. Negative for palpitations and leg swelling.   Gastrointestinal: Negative for nausea.   Skin: Negative for rash.   All other systems reviewed and are negative.    Allergies:  Amoxicillin  Sulfa Drugs  Vancomycin    Medications:  Albuterol   Atarax     Past Medical History:     Herpes Zoster   Mild persistent asthma   Morbid obesity   Sebaceous cyst   ADD     Past Surgical History:    The patient denies pertinent past surgical history.     Family History:    Father - alcohol/drug abuse   Mother - HLD, migraines   Brother - asthma  Unspecified - heart disease     Social History:  The patient presents to the ED w/ his wife via private vehicle   PCP: Dalia Stark   Hx of tobacco use (current smoker) and marijuana use     Physical Exam     Patient Vitals for the past 24 hrs:   BP Pulse Resp SpO2   08/25/22 0936 (!) 163/93 81 18 97 %       Physical Exam  General: Well-nourished, appears to be resting comfortably  when I enter the room  Eyes: PERRL, conjunctivae pink no scleral icterus or conjunctival injection  ENT:  Moist mucus membranes, posterior oropharynx clear without erythema or exudates  Respiratory:  Lungs clear to auscultation bilaterally, no crackles/rubs/wheezes.  Good air movement  CV: Normal rate and rhythm, no murmurs/rubs/gallops  GI:  Abdomen soft and non-distended.  Normoactive BS.  No tenderness, guarding or rebound  Skin: Warm, dry.  No rashes or petechiae. No zoster rash  Musculoskeletal: No peripheral edema or calf tenderness  Neuro: Alert and oriented to person/place/time  Psychiatric: Normal affect    Emergency Department Course   ECG:  ECG taken at 0926, ECG read at 0930  Normal sinus rhythm   Normal ECG   Rate 63 bpm. MS interval 130 ms. QRS duration 96 ms. QT/QTc 412/421 ms. P-R-T axes 35 17 43.     Imaging:  Chest XR,  PA & LAT  Preliminary Result  IMPRESSION: PA and lateral views of the chest were obtained.  Cardiomediastinal silhouette is within normal limits. No suspicious  focal pulmonary opacities. No significant pleural effusion or  pneumothorax.    Report per radiology    Laboratory:  Labs Ordered and Resulted from Time of ED Arrival to Time of ED Departure   BASIC METABOLIC PANEL - Abnormal       Result Value    Creatinine 0.70      Sodium 140      Potassium 4.2      Urea Nitrogen 9.6      Chloride 104      Carbon Dioxide (CO2) 26      Anion Gap 10      Glucose 128 (*)     GFR Estimate >90      Calcium 9.6     CBC WITH PLATELETS AND DIFFERENTIAL - Abnormal    WBC Count 8.8      RBC Count 5.46      Hemoglobin 14.8      Hematocrit 47.2      MCV 86      MCH 27.1      MCHC 31.4 (*)     RDW 12.8      Platelet Count 338      % Neutrophils 65      % Lymphocytes 21      % Monocytes 9      % Eosinophils 3      % Basophils 1      % Immature Granulocytes 1      NRBCs per 100 WBC 0      Absolute Neutrophils 5.7      Absolute Lymphocytes 1.8      Absolute Monocytes 0.8      Absolute Eosinophils 0.3       Absolute Basophils 0.1      Absolute Immature Granulocytes 0.1      Absolute NRBCs 0.0     TROPONIN T, HIGH SENSITIVITY - Normal    Troponin T, High Sensitivity 8        Emergency Department Course:    Reviewed:  I reviewed nursing notes, vitals and past medical history    Assessments:  1022 I obtained history and examined the patient as noted above.   1122 I rechecked the patient and explained findings.     Disposition:  The patient was discharged to home.     Impression & Plan   Medical Decision Making:    HEART Score  Criteria   0-2 points for each of 5 items (maximum of 10 points):  Score 0- History slightly suspicious for coronary syndrome  Score 0- EKG Normal  Score 0- Age <45 years old  Score 1- One to 2 risk factors for atherosclerotic disease  Score 0- Within normal limits for troponin levels  Interpretation  Risk of adverse outcome  Heart Score: 1  Total Score 0-3- Adverse Outcome Risk 2.5% - Supports early discharge with appropriate follow-up    Yakov Cantrell is a 32 year old male presented with chest pain. Initial laboratory and imaging tests have come back normal.  Troponin is negative despite duration of symptoms. The chest pain complex would be atypical for ACS and his symptoms have resolved prior to arrival. There is no clinical, laboratory, or radiographic evidence of pulmonary embolism, aortic dissection or cardiac ischemia.  Given the low HEART score, I feel the candidate is appropriate for discharge with outpatient followup with his doctor to discuss stress testing. He did have elevated blood pressure readings here but is otherwise asymptomatic. I recommended repeat blood pressure readings at home and follow-up with PMD for this, discussed the importance of treating hypertension if verified. He has agreed to follow-up with the stress test and primary doctor and return if any worsening.    Diagnosis:    ICD-10-CM    1. Acute chest pain  R07.9    2. Elevated blood pressure reading without  diagnosis of hypertension  R03.0        Discharge Medications:  New Prescriptions    No medications on file       Scribe Disclosure:  I, Richi Mcintosh, am serving as a scribe at 10:22 AM on 8/25/2022 to document services personally performed by Rosalie Noyola MD based on my observations and the provider's statements to me.        Rosalie Noyola MD  08/25/22 1347

## 2022-08-25 NOTE — DISCHARGE INSTRUCTIONS
*You may resume diet and activities as tolerated.  *No new medications. Continue your current medications.    *Follow-up with your doctor for a recheck in 2-3 days.  Follow-up for your outpatient stress test in the next 3 days.  *Return if you develop difficulty in breathing, faint or feel like you will faint or become worse in any way.    Discharge Instructions  Chest Pain    You have been seen today for chest pain or discomfort.  At this time, your doctor has found no signs that your chest pain is due to a serious or life-threatening condition, (or you have declined more testing and/or admission to the hospital). However, sometimes there is a serious problem that does not show up right away. Your evaluation today may not be complete and you may need further testing and evaluation.     You need to follow-up with your regular doctor within 3 days.    Return to the Emergency Department if:  Your chest pain changes, gets worse, starts to happen more often, or comes with less activity.  You are short of breath.  You get very weak or tired.  You pass out or faint.  You have any new symptoms, like fever, cough, numb legs, or you cough up blood.  You have anything else that worries you.    Until you follow-up with your regular doctor please do the following:  Take one aspirin daily unless you have an allergy or are told not to by your doctor.  If a stress test appointment has been made, go to the appointment.  If you have questions, contact your regular doctor.    If your doctor today has told you to follow-up with your regular doctor, it is very important that you make an appointment with your clinic and go to the appointment.  If you do not follow-up with your primary doctor, it may result in missing an important development which could result in permanent injury or disability and/or lasting pain.  If there is any problem keeping your appointment, call your doctor or return to the Emergency Department.    If you were  given a prescription for medicine here today, be sure to read all of the information (including the package insert) that comes with your prescription.  This will include important information about the medicine, its side effects, and any warnings that you need to know about.  The pharmacist who fills the prescription can provide more information and answer questions you may have about the medicine.  If you have questions or concerns that the pharmacist cannot address, please call or return to the Emergency Department.     Opioid Medication Information    Pain medications are among the most commonly prescribed medicines, so we are including this information for all our patients. If you did not receive pain medication or get a prescription for pain medicine, you can ignore it.     You may have been given a prescription for an opioid (narcotic) pain medicine and/or have received a pain medicine while here in the Emergency Department. These medicines can make you drowsy or impaired. You must not drive, operate dangerous equipment, or engage in any other dangerous activities while taking these medications. If you drive while taking these medications, you could be arrested for DUI, or driving under the influence. Do not drink any alcohol while you are taking these medications.     Opioid pain medications can cause addiction. If you have a history of chemical dependency of any type, you are at a higher risk of becoming addicted to pain medications.  Only take these prescribed medications to treat your pain when all other options have been tried. Take it for as short a time and as few doses as possible. Store your pain pills in a secure place, as they are frequently stolen and provide a dangerous opportunity for children or visitors in your house to start abusing these powerful medications. We will not replace any lost or stolen medicine.  As soon as your pain is better, you should flush all your remaining medication.      Many prescription pain medications contain Tylenol  (acetaminophen), including Vicodin , Tylenol #3 , Norco , Lortab , and Percocet .  You should not take any extra pills of Tylenol  if you are using these prescription medications or you can get very sick.  Do not ever take more than 3000 mg of acetaminophen in any 24 hour period.    All opioids tend to cause constipation. Drink plenty of water and eat foods that have a lot of fiber, such as fruits, vegetables, prune juice, apple juice and high fiber cereal.  Take a laxative if you don t move your bowels at least every other day. Miralax , Milk of Magnesia, Colace , or Senna  can be used to keep you regular.      Remember that you can always come back to the Emergency Department if you are not able to see your regular doctor in the amount of time listed above, if you get any new symptoms, or if there is anything that worries you.    Discharge Instructions  Hypertension - High Blood Pressure    During you visit to the Emergency Department, your blood pressure was higher than the recommended blood pressure.  This may be related to stress, pain, medication or other temporary conditions. In these cases, your blood pressure may return to normal on its own. If you have a history of high blood pressure, you may need to have your doctor adjust your medications. Sometimes, your high measurement here may indicate that you have developed high blood pressure that will stay high unless it is treated. Sudden very high blood pressure can cause problems, but usually high blood pressure causes problems over months to years.      Blood pressure is almost never lowered in the Emergency Department, because studies have shown that lowering blood pressure too quickly is much more dangerous than leaving it alone.    You need to follow up with your doctor in 1-3 days to get your blood pressure rechecked.     Return to the Emergency Department if you start to have:  A severe  headache.  Chest pain.  Shortness of breath.  Weakness or numbness that affects one part of the body.  Confusion.  Vision changes.  Significant swelling of legs and/or eyes.  A reaction to any medication started in the Emergency Department.    What can I do to help myself?  Avoid alcohol.  Take any blood pressure medicine that you are prescribed.  Get a good night s sleep.  Lower your salt intake.  Exercise.  Lose weight.  Manage stress.    If blood pressure medication was started in the Emergency Department:  The medicine may not have an immediate effect. The body and brain determine what blood pressure you have. The medicine s job is to retrain the body s  thermostat  to a lower blood pressure.  You will need to follow up with your doctor to see how this medicine is working for you.  If you were given a prescription for medicine here today, be sure to read all of the information (including the package insert) that comes with your prescription.  This will include important information about the medicine, its side effects, and any warnings that you need to know about.  The pharmacist who fills the prescription can provide more information and answer questions you may have about the medicine.  If you have questions or concerns that the pharmacist cannot address, please call or return to the Emergency Department.   Opioid Medication Information    Pain medications are among the most commonly prescribed medicines, so we are including this information for all our patients. If you did not receive pain medication or get a prescription for pain medicine, you can ignore it.     You may have been given a prescription for an opioid (narcotic) pain medicine and/or have received a pain medicine while here in the Emergency Department. These medicines can make you drowsy or impaired. You must not drive, operate dangerous equipment, or engage in any other dangerous activities while taking these medications. If you drive while  taking these medications, you could be arrested for DUI, or driving under the influence. Do not drink any alcohol while you are taking these medications.     Opioid pain medications can cause addiction. If you have a history of chemical dependency of any type, you are at a higher risk of becoming addicted to pain medications.  Only take these prescribed medications to treat your pain when all other options have been tried. Take it for as short a time and as few doses as possible. Store your pain pills in a secure place, as they are frequently stolen and provide a dangerous opportunity for children or visitors in your house to start abusing these powerful medications. We will not replace any lost or stolen medicine.  As soon as your pain is better, you should flush all your remaining medication.     Many prescription pain medications contain Tylenol  (acetaminophen), including Vicodin , Tylenol #3 , Norco , Lortab , and Percocet .  You should not take any extra pills of Tylenol  if you are using these prescription medications or you can get very sick.  Do not ever take more than 3000 mg of acetaminophen in any 24 hour period.    All opioids tend to cause constipation. Drink plenty of water and eat foods that have a lot of fiber, such as fruits, vegetables, prune juice, apple juice and high fiber cereal.  Take a laxative if you don t move your bowels at least every other day. Miralax , Milk of Magnesia, Colace , or Senna  can be used to keep you regular.      Remember that you can always come back to the Emergency Department if you are not able to see your regular doctor in the amount of time listed above, if you get any new symptoms, or if there is anything that worries you.

## 2022-08-25 NOTE — ED TRIAGE NOTES
Pt presents to ED with elevated blood pressure and left sided chest tightness for the past 2 days with left arm numbness and pain between the shoulder blades.        Triage Assessment     Row Name 08/25/22 0935       Triage Assessment (Adult)    Airway WDL WDL

## 2022-08-25 NOTE — TELEPHONE ENCOUNTER
"Pt reports \"tightness in upper L chest area.\"  Ongoing intermittently x 48 hours.  \"Almost considered going into ER because my anxiety really went up.\"  Had shortness of breath.  Went to  a fresh albuterol inhaler.    \"Felt worse last night after having [employer] - indigestion as well.\"  Checked BP -> 160/100 with self-service machine at a pharmacy.    Currently \"feels like a pinching sensation under L breast.\"  \"With overall tightness in that area.\"  \"L arm felt weird last night and two days ago -> almost went to ER then.\"  Familial history of heart disease.    Discussed calling 911.  Pt has wife available to drive him to ED promptly.  States intention to go to [facility].    [facility] Nurse Advisor     Reason for Disposition    Chest pain lasting longer than 5 minutes and ANY of the following:* Over 44 years old* Over 30 years old and at least one cardiac risk factor (e.g., diabetes mellitus, high blood pressure, high cholesterol, smoker, or strong family history of heart disease)* History of heart disease (i.e., angina, heart attack, heart failure, bypass surgery, takes nitroglycerin)* Pain is crushing, pressure-like, or heavy    Additional Information    Negative: SEVERE difficulty breathing (e.g., struggling for each breath, speaks in single words)    Negative: Passed out (i.e., fainted, collapsed and was not responding)    Negative: Difficult to awaken or acting confused (e.g., disoriented, slurred speech)    Negative: Shock suspected (e.g., cold/pale/clammy skin, too weak to stand, low BP, rapid pulse)    Protocols used: CHEST PAIN-A-OH      "

## 2022-09-06 ENCOUNTER — OFFICE VISIT (OUTPATIENT)
Dept: FAMILY MEDICINE | Facility: CLINIC | Age: 32
End: 2022-09-06
Payer: COMMERCIAL

## 2022-09-06 VITALS
BODY MASS INDEX: 38.76 KG/M2 | WEIGHT: 306 LBS | OXYGEN SATURATION: 98 % | HEART RATE: 76 BPM | TEMPERATURE: 99.4 F | RESPIRATION RATE: 16 BRPM | SYSTOLIC BLOOD PRESSURE: 132 MMHG | DIASTOLIC BLOOD PRESSURE: 92 MMHG

## 2022-09-06 DIAGNOSIS — F41.9 ANXIETY: ICD-10-CM

## 2022-09-06 DIAGNOSIS — R94.6 THYROID FUNCTION TEST ABNORMAL: ICD-10-CM

## 2022-09-06 DIAGNOSIS — F12.20 CANNABIS DEPENDENCE, EPISODIC USE (H): ICD-10-CM

## 2022-09-06 DIAGNOSIS — I10 BENIGN ESSENTIAL HYPERTENSION: Primary | ICD-10-CM

## 2022-09-06 DIAGNOSIS — E66.01 MORBID OBESITY (H): ICD-10-CM

## 2022-09-06 DIAGNOSIS — Z13.220 LIPID SCREENING: ICD-10-CM

## 2022-09-06 LAB
CHOLEST SERPL-MCNC: 248 MG/DL
FASTING STATUS PATIENT QL REPORTED: NO
HBA1C MFR BLD: 5.8 % (ref 0–5.6)
HDLC SERPL-MCNC: 38 MG/DL
LDLC SERPL CALC-MCNC: 175 MG/DL
NONHDLC SERPL-MCNC: 210 MG/DL
T3FREE SERPL-MCNC: 2.8 PG/ML (ref 2–4.4)
T4 FREE SERPL-MCNC: 0.71 NG/DL (ref 0.76–1.46)
TRIGL SERPL-MCNC: 176 MG/DL
TSH SERPL DL<=0.005 MIU/L-ACNC: 2.88 MU/L (ref 0.4–4)

## 2022-09-06 PROCEDURE — 84439 ASSAY OF FREE THYROXINE: CPT | Performed by: FAMILY MEDICINE

## 2022-09-06 PROCEDURE — 84481 FREE ASSAY (FT-3): CPT | Performed by: FAMILY MEDICINE

## 2022-09-06 PROCEDURE — 80061 LIPID PANEL: CPT | Performed by: FAMILY MEDICINE

## 2022-09-06 PROCEDURE — 83036 HEMOGLOBIN GLYCOSYLATED A1C: CPT | Performed by: FAMILY MEDICINE

## 2022-09-06 PROCEDURE — 99214 OFFICE O/P EST MOD 30 MIN: CPT | Performed by: FAMILY MEDICINE

## 2022-09-06 PROCEDURE — 84443 ASSAY THYROID STIM HORMONE: CPT | Performed by: FAMILY MEDICINE

## 2022-09-06 PROCEDURE — 36415 COLL VENOUS BLD VENIPUNCTURE: CPT | Performed by: FAMILY MEDICINE

## 2022-09-06 RX ORDER — PROPRANOLOL HYDROCHLORIDE 40 MG/1
40 TABLET ORAL DAILY
Qty: 90 TABLET | Refills: 0 | Status: SHIPPED | OUTPATIENT
Start: 2022-09-06 | End: 2022-09-09

## 2022-09-06 ASSESSMENT — ASTHMA QUESTIONNAIRES
ACT_TOTALSCORE: 18
QUESTION_4 LAST FOUR WEEKS HOW OFTEN HAVE YOU USED YOUR RESCUE INHALER OR NEBULIZER MEDICATION (SUCH AS ALBUTEROL): TWO OR THREE TIMES PER WEEK
ACT_TOTALSCORE: 18
QUESTION_2 LAST FOUR WEEKS HOW OFTEN HAVE YOU HAD SHORTNESS OF BREATH: ONCE OR TWICE A WEEK
QUESTION_1 LAST FOUR WEEKS HOW MUCH OF THE TIME DID YOUR ASTHMA KEEP YOU FROM GETTING AS MUCH DONE AT WORK, SCHOOL OR AT HOME: SOME OF THE TIME
QUESTION_3 LAST FOUR WEEKS HOW OFTEN DID YOUR ASTHMA SYMPTOMS (WHEEZING, COUGHING, SHORTNESS OF BREATH, CHEST TIGHTNESS OR PAIN) WAKE YOU UP AT NIGHT OR EARLIER THAN USUAL IN THE MORNING: NOT AT ALL
QUESTION_5 LAST FOUR WEEKS HOW WOULD YOU RATE YOUR ASTHMA CONTROL: SOMEWHAT CONTROLLED

## 2022-09-06 ASSESSMENT — ANXIETY QUESTIONNAIRES
3. WORRYING TOO MUCH ABOUT DIFFERENT THINGS: MORE THAN HALF THE DAYS
2. NOT BEING ABLE TO STOP OR CONTROL WORRYING: MORE THAN HALF THE DAYS
7. FEELING AFRAID AS IF SOMETHING AWFUL MIGHT HAPPEN: MORE THAN HALF THE DAYS
6. BECOMING EASILY ANNOYED OR IRRITABLE: SEVERAL DAYS
1. FEELING NERVOUS, ANXIOUS, OR ON EDGE: SEVERAL DAYS

## 2022-09-06 ASSESSMENT — PATIENT HEALTH QUESTIONNAIRE - PHQ9
5. POOR APPETITE OR OVEREATING: MORE THAN HALF THE DAYS
SUM OF ALL RESPONSES TO PHQ QUESTIONS 1-9: 13

## 2022-09-06 NOTE — PROGRESS NOTES
"  Assessment & Plan     Benign essential hypertension  - start on anti-hypertensive.   - losartan (COZAAR) 25 MG tablet; Take 1 tablet (25 mg) by mouth daily    Morbid obesity (H)  - diet and lifestyle changes discussed   - Hemoglobin A1c; Future  - Hemoglobin A1c    Anxiety  - refill hydroxyzine     Cannabis dependence, episodic use (H)  - advised cessation     Thyroid function test abnormal  - recheck labs   - TSH; Future  - T4, free; Future  - T3, Free; Future  - TSH  - T4, free  - T3, Free    Lipid screening  - Lipid panel reflex to direct LDL Non-fasting; Future  - Lipid panel reflex to direct LDL Non-fasting               BMI:   Estimated body mass index is 38.76 kg/m  as calculated from the following:    Height as of 3/2/22: 1.892 m (6' 2.5\").    Weight as of this encounter: 138.8 kg (306 lb).   Weight management plan: Discussed healthy diet and exercise guidelines    Depression Screening Follow Up    PHQ 9/6/2022   PHQ-9 Total Score 13   Q9: Thoughts of better off dead/self-harm past 2 weeks Nearly every day             No follow-ups on file.   Follow-up Visit   Expected date:  Oct 06, 2022 (Approximate)      Follow Up Appointment Details:     Follow-up with whom?: PCP    Follow-Up for what?: Chronic Disease f/u    Chronic Disease f/u:  Anxiety  Hypertension       How?: In Person                    Shante Rodriguez MD  [facility]    Subjective   Yakov is a 32 year old}, presenting for the following health issues:  RECHECK (BP follow up) and Anxiety (Having panic attacks)      HPI     ED/UC Followup:    Facility:  [facility]  Date of visit: 8/25/22  Reason for visit: Acute Chest pain  Current Status: hypertensive -average 154/100    Patient was seen in the ER for chest pain. Work up was unremarkable for cardiac etiology. He admits he has been feeling more anxious lately and the more he ponders those thoughts he more anxious he gets and he feels tightness " in his chest.   States since the ER he has been working on cutting back his salt intake and incorporating at least 30 mins of exercise daily.     Breakfast- v8  Lunch- sandwich with chips and fruit (most days no lunch)  Dinner- carb/protein and veggie    Marijuana- every weekend and some weekdays (smoking since college)  Occasional cigarette smokes- mostly in social settings.     138-168/        Review of Systems   Constitutional, HEENT, cardiovascular, pulmonary, GI, , musculoskeletal, neuro, skin, endocrine and psych systems are negative, except as otherwise noted.      Objective    BP (!) 132/92 (BP Location: Right arm, Patient Position: Chair, Cuff Size: Adult Large)   Pulse 76   Temp 99.4  F (37.4  C) (Oral)   Resp 16   Wt 138.8 kg (306 lb)   SpO2 98%   BMI 38.76 kg/m    Body mass index is 38.76 kg/m .  Physical Exam   GENERAL: healthy, alert and no distress  NECK: no adenopathy, no asymmetry, masses, or scars and thyroid normal to palpation  RESP: lungs clear to auscultation - no rales, rhonchi or wheezes  CV: regular rate and rhythm, normal S1 S2, no S3 or S4, no murmur, click or rub, no peripheral edema and peripheral pulses strong  ABDOMEN: soft, nontender, no hepatosplenomegaly, no masses and bowel sounds normal  MS: no gross musculoskeletal defects noted, no edema  PSYCH: mentation appears normal, affect normal/bright                [unfilled]  @[facility] DepartmentKHAI@

## 2022-09-09 RX ORDER — LOSARTAN POTASSIUM 25 MG/1
25 TABLET ORAL DAILY
Qty: 90 TABLET | Refills: 0 | Status: SHIPPED | OUTPATIENT
Start: 2022-09-09 | End: 2022-10-28

## 2022-09-09 RX ORDER — HYDROXYZINE HYDROCHLORIDE 25 MG/1
25 TABLET, FILM COATED ORAL EVERY 6 HOURS PRN
Qty: 30 TABLET | Refills: 1 | Status: SHIPPED | OUTPATIENT
Start: 2022-09-09 | End: 2023-06-01

## 2022-10-10 ENCOUNTER — HEALTH MAINTENANCE LETTER (OUTPATIENT)
Age: 32
End: 2022-10-10

## 2022-10-12 NOTE — NURSING NOTE
"Chief Complaint   Patient presents with     Shingles     follow up       Initial BP (!) 150/94  Pulse 80  Temp 99  F (37.2  C) (Oral)  Resp 20  Wt 276 lb 6.4 oz (125.4 kg)  SpO2 99%  BMI 35.01 kg/m2 Estimated body mass index is 35.01 kg/(m^2) as calculated from the following:    Height as of 7/19/17: 6' 2.5\" (1.892 m).    Weight as of this encounter: 276 lb 6.4 oz (125.4 kg).  Medication Reconciliation: complete   Naomie Welch CMA (AAMA)      " [FreeTextEntry2] : left knee pain

## 2022-10-28 ENCOUNTER — VIRTUAL VISIT (OUTPATIENT)
Dept: FAMILY MEDICINE | Facility: CLINIC | Age: 32
End: 2022-10-28
Payer: COMMERCIAL

## 2022-10-28 ENCOUNTER — NURSE TRIAGE (OUTPATIENT)
Dept: NURSING | Facility: CLINIC | Age: 32
End: 2022-10-28

## 2022-10-28 DIAGNOSIS — I10 BENIGN ESSENTIAL HYPERTENSION: Primary | ICD-10-CM

## 2022-10-28 PROCEDURE — 99213 OFFICE O/P EST LOW 20 MIN: CPT | Mod: 95 | Performed by: PHYSICIAN ASSISTANT

## 2022-10-28 RX ORDER — HYDROCHLOROTHIAZIDE 12.5 MG/1
12.5 TABLET ORAL DAILY
Qty: 30 TABLET | Refills: 1 | Status: SHIPPED | OUTPATIENT
Start: 2022-10-28 | End: 2023-03-01

## 2022-10-28 NOTE — TELEPHONE ENCOUNTER
"Nurse Triage SBAR    Is this a 2nd Level Triage? YES, LICENSED PRACTITIONER REVIEW IS REQUIRED    Situation: Yakov is very anxious and concerned about side effects from blood pressure medication started in September. Has needed PRN anxiety medication more frequently than usual.      Background: Started 25mg/daily losartan September 6th after finding he was hypertensive via home BP cuff. Was seen in  in October 1st for dizziness but nothing definitive found and medication change wasn't discussed.    Assessment: Blood pressure while on phone was 154/94 with hr 98. Forgot medication last night and hasn't taken it yet today. Sounded very anxious and worried on the phone. No dizziness, \"chest jolts\", chest pain or feeling lightheaded at the moment.       Protocol Recommended Disposition:   Discuss With PCP And Call Back By Nurse Today    Recommendation: He'd like to either be worked in today or hear from someone if it's ok to stop the medication.      Routed to provider    Does the patient meet one of the following criteria for ADS visit consideration? 16+ years old, with an [facility] PCP     TIP  Providers, please consider if this condition is appropriate for management at one of our Acute and Diagnostic Services sites.     If patient is a good candidate, please use dotphrase <dot>triageresponse and select Refer to ADS to document.        Reason for Disposition    Taking BP medications and feels is having side effects (e.g., impotence, cough, dizziness)    Additional Information    Negative: Sounds like a life-threatening emergency to the triager    Negative: Pregnant > 20 weeks or postpartum (< 6 weeks after delivery) and new hand or face swelling    Negative: Pregnant > 20 weeks and BP > 140/90    Negative: Systolic BP >= 160 OR Diastolic >= 100, and any cardiac or neurologic symptoms (e.g., chest pain, difficulty breathing, unsteady gait, blurred vision)    Negative: Patient sounds very sick or weak to the triager    " Negative: BP Systolic BP >= 140 OR Diastolic >= 90 and postpartum (from 0 to 6 weeks after delivery)    Negative: Systolic BP >= 180 OR Diastolic >= 110, and missed most recent dose of blood pressure medication    Negative: Systolic BP >= 180 OR Diastolic >= 110    Negative: Patient wants to be seen    Negative: Ran out of BP medications    Protocols used: HIGH BLOOD PRESSURE-A-OH

## 2022-10-28 NOTE — PROGRESS NOTES
Yakov is a 32 year old who is being evaluated via a billable video visit.      How would you like to obtain your AVS? MyChart  If the video visit is dropped, the invitation should be resent by: Text to cell phone: [phone]  Will anyone else be joining your video visit? No          Assessment & Plan   Problem List Items Addressed This Visit    None  Visit Diagnoses     Benign essential hypertension    -  Primary    Relevant Medications    hydrochlorothiazide (HYDRODIURIL) 12.5 MG tablet         Change from losartan to hydrochlorothiazide to monitor for symptoms improvement   If he still experiences the zing feeling, consider a ZioPatch for a week.  Checking BP at home causes increased anxiety - ok to wait to have it checked in clinic.                  Return in about 4 weeks (around 11/25/2022) for Blood Pressure Recheck, Medication Recheck, Repeat Labs.    Cata Muhammad, JEANETTE  [facility]    Noelle Nagy is a 32 year old, presenting for the following health issues:  Hypertension and Med Change Request      HPI     Medication Followup of Losartan    Taking Medication as prescribed: yes    Side Effects:  Patient gets fainting spells, is dizzy when he takes his medication at a different time than the day before (it gets to be more than 24 hours between doses).    Medication Helping Symptoms:  Blood pressure remains high.    He felt like his chest was tight and BP is elevated  Started losartan in Sept  [place] like he was having fainting spells  When to UC - EKG/XR normal  Sporadic feeling like jolt coming from his chest            Review of Systems         Objective           Vitals:  No vitals were obtained today due to virtual visit.    Physical Exam   GENERAL: Healthy, alert and no distress  EYES: Eyes grossly normal to inspection.  No discharge or erythema, or obvious scleral/conjunctival abnormalities.  RESP: No audible wheeze, cough, or visible cyanosis.  No visible  retractions or increased work of breathing.    SKIN: Visible skin clear. No significant rash, abnormal pigmentation or lesions.  NEURO: Cranial nerves grossly intact.  Mentation and speech appropriate for age.  PSYCH: Mentation appears normal, affect normal/bright, judgement and insight intact, normal speech and appearance well-groomed.                Video-Visit Details    Video Start Time: 12:09    Type of service:  Video Visit    Video End Time:12:21 PM    Originating Location (pt. Location): Home        Distant Location (provider location):  On-site    Platform used for Video Visit: Natty

## 2022-10-29 ENCOUNTER — MYC MEDICAL ADVICE (OUTPATIENT)
Dept: FAMILY MEDICINE | Facility: CLINIC | Age: 32
End: 2022-10-29

## 2022-11-01 NOTE — TELEPHONE ENCOUNTER
Please see MyChart message and advise.     Patient is referring to Hydrochlorothiazide Recall FDA 10/25.    Sona Henderson RN  [facility] Triage Team

## 2023-02-07 DIAGNOSIS — I10 BENIGN ESSENTIAL HYPERTENSION: ICD-10-CM

## 2023-02-07 RX ORDER — HYDROCHLOROTHIAZIDE 12.5 MG/1
12.5 TABLET ORAL DAILY
Qty: 30 TABLET | Refills: 1 | OUTPATIENT
Start: 2023-02-07

## 2023-02-07 NOTE — TELEPHONE ENCOUNTER
Declined, I will refill once patient has an appointment.  Dalia Stark MD  [facility]  [phone]

## 2023-02-18 ENCOUNTER — HEALTH MAINTENANCE LETTER (OUTPATIENT)
Age: 33
End: 2023-02-18

## 2023-03-01 ENCOUNTER — OFFICE VISIT (OUTPATIENT)
Dept: FAMILY MEDICINE | Facility: CLINIC | Age: 33
End: 2023-03-01
Payer: COMMERCIAL

## 2023-03-01 VITALS
WEIGHT: 311.38 LBS | DIASTOLIC BLOOD PRESSURE: 90 MMHG | BODY MASS INDEX: 38.72 KG/M2 | SYSTOLIC BLOOD PRESSURE: 136 MMHG | HEIGHT: 75 IN | HEART RATE: 78 BPM | TEMPERATURE: 98.5 F | OXYGEN SATURATION: 94 %

## 2023-03-01 DIAGNOSIS — I10 PRIMARY HYPERTENSION: ICD-10-CM

## 2023-03-01 DIAGNOSIS — L40.9 PSORIASIS: ICD-10-CM

## 2023-03-01 DIAGNOSIS — D17.30 LIPOMA OF SKIN AND SUBCUTANEOUS TISSUE: Primary | ICD-10-CM

## 2023-03-01 PROCEDURE — 90471 IMMUNIZATION ADMIN: CPT | Performed by: FAMILY MEDICINE

## 2023-03-01 PROCEDURE — 90472 IMMUNIZATION ADMIN EACH ADD: CPT | Performed by: FAMILY MEDICINE

## 2023-03-01 PROCEDURE — 90715 TDAP VACCINE 7 YRS/> IM: CPT | Performed by: FAMILY MEDICINE

## 2023-03-01 PROCEDURE — 90686 IIV4 VACC NO PRSV 0.5 ML IM: CPT | Performed by: FAMILY MEDICINE

## 2023-03-01 PROCEDURE — 99214 OFFICE O/P EST MOD 30 MIN: CPT | Mod: 25 | Performed by: FAMILY MEDICINE

## 2023-03-01 PROCEDURE — 90677 PCV20 VACCINE IM: CPT | Performed by: FAMILY MEDICINE

## 2023-03-01 RX ORDER — CLOBETASOL PROPIONATE 0.5 MG/G
CREAM TOPICAL 2 TIMES DAILY
Qty: 60 G | Refills: 3 | Status: SHIPPED | OUTPATIENT
Start: 2023-03-01 | End: 2023-06-01

## 2023-03-01 ASSESSMENT — ASTHMA QUESTIONNAIRES
ACT_TOTALSCORE: 22
ACT_TOTALSCORE: 22
QUESTION_1 LAST FOUR WEEKS HOW MUCH OF THE TIME DID YOUR ASTHMA KEEP YOU FROM GETTING AS MUCH DONE AT WORK, SCHOOL OR AT HOME: NONE OF THE TIME
QUESTION_4 LAST FOUR WEEKS HOW OFTEN HAVE YOU USED YOUR RESCUE INHALER OR NEBULIZER MEDICATION (SUCH AS ALBUTEROL): ONCE A WEEK OR LESS
QUESTION_2 LAST FOUR WEEKS HOW OFTEN HAVE YOU HAD SHORTNESS OF BREATH: NOT AT ALL
QUESTION_3 LAST FOUR WEEKS HOW OFTEN DID YOUR ASTHMA SYMPTOMS (WHEEZING, COUGHING, SHORTNESS OF BREATH, CHEST TIGHTNESS OR PAIN) WAKE YOU UP AT NIGHT OR EARLIER THAN USUAL IN THE MORNING: NOT AT ALL
QUESTION_5 LAST FOUR WEEKS HOW WOULD YOU RATE YOUR ASTHMA CONTROL: SOMEWHAT CONTROLLED

## 2023-03-01 NOTE — PROGRESS NOTES
"  Assessment & Plan     Lipoma of skin and subcutaneous tissue  Noticed on the left leg, benign appearing, continue on watchful monitoring.    Psoriasis  Sever, but less than 5% of the body, will start on   - clobetasol (TEMOVATE) 0.05 % external cream; Apply topically 2 times daily  Apply mainly on the hands, elbows and knees.     Primary hypertension  Today I counseled the patient about diet, regular exercise and weight loss planning.  Decided to hold off on meds and recheck in 2 to 3 months, with weight loss plan of 30 lb weight loss.                BMI:   Estimated body mass index is 39.44 kg/m  as calculated from the following:    Height as of this encounter: 1.892 m (6' 2.5\").    Weight as of this encounter: 141.2 kg (311 lb 6 oz).   Weight management plan: Discussed healthy diet and exercise guidelines          Dalia Stark MD  [facility]    Noelle Nagy is a 32 year old, presenting for the following health issues:  Recheck Medication (Hydrochlorothiazide 12.5mg)      History of Present Illness     Asthma:  He presents for follow up of asthma.  He has no cough, no wheezing, and no shortness of breath. He is using a relief medication a few times a month. He does not have a controller medication. Patient is aware of the following triggers: same as previous visit. The patient has had a visit to the Emergency Room, Urgent Care or Hospital due to asthma since the last clinic visit. He has been to the Emergency Room or Urgent Care 0 times.He has had a Hospitalization 0 times.    Hypertension: He presents for follow up of hypertension.  He does check blood pressure  regularly outside of the clinic. Outside blood pressures have been over 140/90. He does not follow a low salt diet.     He eats 0-1 servings of fruits and vegetables daily.He consumes 1 sweetened beverage(s) daily.He exercises with enough effort to increase his heart rate 9 or less minutes per day.  He exercises with " "enough effort to increase his heart rate 3 or less days per week. He is missing 4 dose(s) of medications per week.       Medication Followup of Hydrochlorothiazide 12.5mg    Taking Medication as prescribed: missed for taking for 2 weeks    Side Effects:  Levels are off    Medication Helping Symptoms:  Not applicable right now    Hypertension Follow-up      Do you check your blood pressure regularly outside of the clinic? Yes     Are you following a low salt diet? Yes    Are your blood pressures ever more than 140 on the top number (systolic) OR more   than 90 on the bottom number (diastolic), for example 140/90? Yes           Review of Systems   CONSTITUTIONAL: NEGATIVE for fever, chills, change in weight  RESP: NEGATIVE for significant cough or SOB  CV: NEGATIVE for chest pain, palpitations or peripheral edema      Objective    BP (!) 136/90 (BP Location: Right arm, Patient Position: Sitting, Cuff Size: Adult Large)   Pulse 78   Temp 98.5  F (36.9  C) (Oral)   Ht 1.892 m (6' 2.5\")   Wt 141.2 kg (311 lb 6 oz)   SpO2 94%   BMI 39.44 kg/m    Body mass index is 39.44 kg/m .  Physical Exam   GENERAL: healthy, alert and no distress  RESP: lungs clear to auscultation - no rales, rhonchi or wheezes  CV: regular rate and rhythm, normal S1 S2, no S3 or S4, no murmur, click or rub, no peripheral edema and peripheral pulses strong  MS: round, well demarcated 2 cm soft nodule under the left lower leg. Consistent with lipoma.   SKIN: Erythematous well demarcated patches spread on the hands with hyperkeratosis, and fissures, with smaller papules with flakes spread on the arms, elbows and knees (extensor surfaces).                    "

## 2023-03-23 ENCOUNTER — OFFICE VISIT (OUTPATIENT)
Dept: FAMILY MEDICINE | Facility: CLINIC | Age: 33
End: 2023-03-23
Payer: COMMERCIAL

## 2023-03-23 ENCOUNTER — ALLIED HEALTH/NURSE VISIT (OUTPATIENT)
Dept: FAMILY MEDICINE | Facility: CLINIC | Age: 33
End: 2023-03-23
Payer: COMMERCIAL

## 2023-03-23 VITALS — HEART RATE: 84 BPM | DIASTOLIC BLOOD PRESSURE: 102 MMHG | SYSTOLIC BLOOD PRESSURE: 148 MMHG | RESPIRATION RATE: 20 BRPM

## 2023-03-23 VITALS
WEIGHT: 315 LBS | SYSTOLIC BLOOD PRESSURE: 140 MMHG | DIASTOLIC BLOOD PRESSURE: 92 MMHG | BODY MASS INDEX: 39.17 KG/M2 | TEMPERATURE: 99.4 F | RESPIRATION RATE: 12 BRPM | HEART RATE: 92 BPM | OXYGEN SATURATION: 95 % | HEIGHT: 75 IN

## 2023-03-23 DIAGNOSIS — I10 PRIMARY HYPERTENSION: Primary | ICD-10-CM

## 2023-03-23 DIAGNOSIS — R73.03 PREDIABETES: ICD-10-CM

## 2023-03-23 DIAGNOSIS — I10 BENIGN ESSENTIAL HYPERTENSION: Primary | ICD-10-CM

## 2023-03-23 DIAGNOSIS — R42 LIGHTHEADED: ICD-10-CM

## 2023-03-23 LAB — HBA1C MFR BLD: 6 % (ref 0–5.6)

## 2023-03-23 PROCEDURE — 99207 PR NO CHARGE NURSE ONLY: CPT

## 2023-03-23 PROCEDURE — 36415 COLL VENOUS BLD VENIPUNCTURE: CPT | Performed by: PHYSICIAN ASSISTANT

## 2023-03-23 PROCEDURE — 99214 OFFICE O/P EST MOD 30 MIN: CPT | Performed by: PHYSICIAN ASSISTANT

## 2023-03-23 PROCEDURE — 80048 BASIC METABOLIC PNL TOTAL CA: CPT | Performed by: PHYSICIAN ASSISTANT

## 2023-03-23 PROCEDURE — 83036 HEMOGLOBIN GLYCOSYLATED A1C: CPT | Performed by: PHYSICIAN ASSISTANT

## 2023-03-23 RX ORDER — AMLODIPINE BESYLATE 2.5 MG/1
2.5 TABLET ORAL DAILY
Qty: 30 TABLET | Refills: 1 | Status: SHIPPED | OUTPATIENT
Start: 2023-03-23 | End: 2023-05-22

## 2023-03-23 ASSESSMENT — ENCOUNTER SYMPTOMS: HEADACHES: 1

## 2023-03-23 NOTE — NURSING NOTE
Patient had headache 3 days ago. Headache lasted til next day.  Headache returned on and off, also vertigo on and off and a little nausea and heart rate feels fast at times.  Was on bp medications but Dr. Stark recently stopped bp med.  Symptoms last 6 plus hours and comes in waves.  Nhi Dykes RN

## 2023-03-23 NOTE — NURSING NOTE
Scheduled with Nava at 3:10 pm.  Waited to see if able to be seen now but provider in a long visit.  Patient OK coming back.  Nhi Dykes RN

## 2023-03-23 NOTE — PROGRESS NOTES
"  Assessment & Plan     Benign essential hypertension  Blood pressure not well controlled. I am questioning if it is causing his symptoms. Orthostatics are negative.  Started on amlodipine 2.5 mg. Increase to 5 mg after 1 week if still high.  Patient asked about dizziness being vertiginous in nature and I think this is less likely. He is describing as zahira of a faint feeling. Only had one episode of rooming moving feeling.  Headache is mild. Neuro exam normal.  - Basic metabolic panel  (Ca, Cl, CO2, Creat, Gluc, K, Na, BUN); Future  - amLODIPine (NORVASC) 2.5 MG tablet; Take 1 tablet (2.5 mg) by mouth daily  - PRIMARY CARE FOLLOW-UP SCHEDULING; Future  - Basic metabolic panel  (Ca, Cl, CO2, Creat, Gluc, K, Na, BUN)    Prediabetes  Recheck today. Considered blood sugars also causing symptoms.  BMP pending.   A1c showing continued pre-diabetes.  - Hemoglobin A1c; Future  - Hemoglobin A1c    Lightheaded  As noted above    Review of the result(s) of each unique test - A1c  Ordering of each unique test  Prescription drug management      Nava Centeno PA-C  [facility]    Noelle Nagy is a 32 year old, presenting for the following health issues:  Headache and Dizziness    Additional Questions 3/23/2023   Roomed by Anny FARLEY     Headache     History of Present Illness       Headaches:   Since the patient's last clinic visit, headaches are: worsened  The patient is getting headaches:  Daily  He is not able to do normal daily activities when he has a migraine.  The patient is taking the following rescue/relief medications:  Ibuprofen (Advil, Motrin)   Patient states \"I get some relief\" from the rescue/relief medications.   The patient is taking the following medications to prevent migraines:  No medications to prevent migraines  In the past 4 weeks, the patient has gone to an Urgent Care or Emergency Room 0 times times due to headaches.    He eats 0-1 servings of fruits and vegetables " "daily.He consumes 1 sweetened beverage(s) daily.He exercises with enough effort to increase his heart rate 20 to 29 minutes per day.  He exercises with enough effort to increase his heart rate 3 or less days per week.   He is taking medications regularly.       Dizziness  Onset/Duration: 3 days  Description:   Do you feel faint: No  Does it feel like the surroundings (bed, room) are moving: YES (this occurred once- while smoking marijuana)  Unsteady/off balance: No  Have you passed out or fallen: No  He continues to have a feeling of lightheadedness/faint feeling  Intensity: 8/10- with room spinning and faint feeling (headache is not rated at 8/10 in severity)  Progression of Symptoms: same  Accompanying Signs & Symptoms:  Heart palpitations or chest pain: YES- heart palpitations  Nausea, vomiting: YES- nausea, no vomiting  Weakness or lack of coordination in arms or legs: No  Vision or speech changes: No  Numbness or tingling: No  Ringing in ears (Tinnitus): YES- this morning for 45 seconds  Hearing Loss: No  Headache- comes and goes (not really painful), 2-3/10 in severity- \"feels like my brain is loose and it is banging around\"  History:   Head trauma/concussion history: No  Previous similar symptoms: No  Recent bleeding history: No  Any new medications (BP?): No  Precipitating factors:   Worse with activity: No  Worse with head movement: YES  Alleviating factors:   Does staying in a fixed position give relief: YES- sometimes  Therapies tried and outcome: None    He was previously on losartan and hydrochlorothiazide for blood pressure but recently taken off and instructed to work on lifestyle changes.      Review of Systems   Neurological: Positive for headaches.         Objective    BP (!) 140/92 (BP Location: Right arm, Patient Position: Supine, Cuff Size: Adult Large)   Pulse 92   Temp 99.4  F (37.4  C) (Oral)   Resp 12   Ht 1.892 m (6' 2.5\")   Wt 143.1 kg (315 lb 8 oz)   SpO2 95%   BMI 39.97 kg/m  "   Body mass index is 39.97 kg/m .  Physical Exam   GENERAL: No acute distress  HEENT: Normocephalic, PERRL, EOMI, No nystagmus. Canals patent, bilateral TM's non-erythematous and non-bulging. Turbinates normal in appearance bilaterally. Posterior oropharynx non-erythematous and without exudate.  NEURO: Alert and oriented. Cranial nerves II-XII grossly intact. Gait appropriate. Heel to toe ambulation appropriate. No pronator drift. Negative Romberg's.         Results for orders placed or performed in visit on 03/23/23 (from the past 24 hour(s))   Hemoglobin A1c   Result Value Ref Range    Hemoglobin A1C 6.0 (H) 0.0 - 5.6 %

## 2023-03-24 LAB
ANION GAP SERPL CALCULATED.3IONS-SCNC: 13 MMOL/L (ref 7–15)
BUN SERPL-MCNC: 11.8 MG/DL (ref 6–20)
CALCIUM SERPL-MCNC: 9.3 MG/DL (ref 8.6–10)
CHLORIDE SERPL-SCNC: 103 MMOL/L (ref 98–107)
CREAT SERPL-MCNC: 0.87 MG/DL (ref 0.67–1.17)
DEPRECATED HCO3 PLAS-SCNC: 24 MMOL/L (ref 22–29)
GFR SERPL CREATININE-BSD FRML MDRD: >90 ML/MIN/1.73M2
GLUCOSE SERPL-MCNC: 108 MG/DL (ref 70–99)
POTASSIUM SERPL-SCNC: 4.3 MMOL/L (ref 3.4–5.3)
SODIUM SERPL-SCNC: 140 MMOL/L (ref 136–145)

## 2023-03-30 ENCOUNTER — ALLIED HEALTH/NURSE VISIT (OUTPATIENT)
Dept: FAMILY MEDICINE | Facility: CLINIC | Age: 33
End: 2023-03-30
Payer: COMMERCIAL

## 2023-03-30 VITALS
HEART RATE: 98 BPM | OXYGEN SATURATION: 99 % | SYSTOLIC BLOOD PRESSURE: 156 MMHG | WEIGHT: 314.9 LBS | DIASTOLIC BLOOD PRESSURE: 96 MMHG | BODY MASS INDEX: 39.89 KG/M2

## 2023-03-30 DIAGNOSIS — I10 PRIMARY HYPERTENSION: Primary | ICD-10-CM

## 2023-03-30 PROCEDURE — 99207 PR NO CHARGE NURSE ONLY: CPT

## 2023-04-30 NOTE — TELEPHONE ENCOUNTER
Patient called looking for a refill of his medication.     Routing to refill.     CESARIO YipN, RN  [facility]     Clear bilaterally, pupils equal, round and reactive to light.

## 2023-05-20 DIAGNOSIS — I10 BENIGN ESSENTIAL HYPERTENSION: ICD-10-CM

## 2023-05-22 RX ORDER — AMLODIPINE BESYLATE 2.5 MG/1
TABLET ORAL
Qty: 90 TABLET | Refills: 1 | Status: SHIPPED | OUTPATIENT
Start: 2023-05-22 | End: 2023-06-01

## 2023-05-22 NOTE — TELEPHONE ENCOUNTER
Routing refill request to provider for review/approval because:  Patient's most recent blood pressure reading on file is not below 140/90.     BP Readings from Last 3 Encounters:   03/30/23 (!) 156/96   03/23/23 (!) 140/92   03/23/23 (!) 148/102     Laury RIVERA RN   [facility]

## 2023-05-25 ENCOUNTER — TRANSFERRED RECORDS (OUTPATIENT)
Dept: HEALTH INFORMATION MANAGEMENT | Facility: CLINIC | Age: 33
End: 2023-05-25
Payer: COMMERCIAL

## 2023-06-01 ENCOUNTER — OFFICE VISIT (OUTPATIENT)
Dept: FAMILY MEDICINE | Facility: CLINIC | Age: 33
End: 2023-06-01
Payer: COMMERCIAL

## 2023-06-01 VITALS
OXYGEN SATURATION: 99 % | WEIGHT: 285 LBS | TEMPERATURE: 98.6 F | BODY MASS INDEX: 35.43 KG/M2 | HEIGHT: 75 IN | DIASTOLIC BLOOD PRESSURE: 87 MMHG | RESPIRATION RATE: 20 BRPM | SYSTOLIC BLOOD PRESSURE: 142 MMHG | HEART RATE: 88 BPM

## 2023-06-01 DIAGNOSIS — I10 PRIMARY HYPERTENSION: Primary | ICD-10-CM

## 2023-06-01 DIAGNOSIS — Z11.4 SCREENING FOR HIV (HUMAN IMMUNODEFICIENCY VIRUS): ICD-10-CM

## 2023-06-01 DIAGNOSIS — R73.03 PREDIABETES: ICD-10-CM

## 2023-06-01 DIAGNOSIS — Z11.59 NEED FOR HEPATITIS C SCREENING TEST: ICD-10-CM

## 2023-06-01 PROCEDURE — 99213 OFFICE O/P EST LOW 20 MIN: CPT | Performed by: FAMILY MEDICINE

## 2023-06-01 NOTE — PROGRESS NOTES
"  Assessment & Plan     Screening for HIV (human immunodeficiency virus)    - HIV Antigen Antibody Combo; Future    Need for hepatitis C screening test    - Hepatitis C Screen Reflex to HCV RNA Quant and Genotype; Future    Prediabetes  Today I counseled the patient about diet, regular exercise and weight loss planning.  Pt is highly motivated to loose weight and plan to loose weight about 260 lb by September. Will recheck in 3 months.   - Hemoglobin A1c; Future    Primary hypertension  Again highly motivated to loose weight, recheck in 2 to 3 months.                    Dalia Stark MD  [facility]    Noelle Nagy is a 32 year old, presenting for the following health issues:  Hypertension and Blood Draw (Check blood sugar level/)        6/1/2023     1:47 PM   Additional Questions   Roomed by Regina CLEARY CMA     History of Present Illness       Hypertension: He presents for follow up of hypertension.  He does not check blood pressure  regularly outside of the clinic. Outside blood pressures have been over 140/90. He does not follow a low salt diet.     He eats 2-3 servings of fruits and vegetables daily.He consumes 0 sweetened beverage(s) daily.He exercises with enough effort to increase his heart rate 30 to 60 minutes per day.  He exercises with enough effort to increase his heart rate 5 days per week. He is missing 3 dose(s) of medications per week.               Review of Systems   CONSTITUTIONAL:weight loss.      Objective    BP (!) 142/87 (BP Location: Right arm, Patient Position: Sitting, Cuff Size: Adult Large)   Pulse 88   Temp 98.6  F (37  C) (Oral)   Resp 20   Ht 1.892 m (6' 2.5\")   Wt 129.3 kg (285 lb)   SpO2 99%   BMI 36.10 kg/m    Body mass index is 36.1 kg/m .  Physical Exam   GENERAL: healthy, alert and no distress                    "

## 2023-09-21 ENCOUNTER — E-VISIT (OUTPATIENT)
Dept: FAMILY MEDICINE | Facility: CLINIC | Age: 33
End: 2023-09-21
Payer: COMMERCIAL

## 2023-09-21 ENCOUNTER — TELEPHONE (OUTPATIENT)
Dept: FAMILY MEDICINE | Facility: CLINIC | Age: 33
End: 2023-09-21

## 2023-09-21 DIAGNOSIS — R42 DIZZINESS: Primary | ICD-10-CM

## 2023-09-21 PROCEDURE — 99207 PR NON-BILLABLE SERV PER CHARTING: CPT | Performed by: FAMILY MEDICINE

## 2023-09-21 NOTE — PATIENT INSTRUCTIONS
LEIGH Nagy:  Couple of things, I wonder if you can contact the Rheumatologist to see if this dizziness is related to Skyrizi, if not, then  I think an in-clinic visit would be best next steps based on your symptoms. My nurse will be calling you to schedule you an appointment.  You can schedule an appointment right here in [facility], or call [phone]    Dalia Stark MD  [facility].   [phone]

## 2023-09-26 ENCOUNTER — OFFICE VISIT (OUTPATIENT)
Dept: FAMILY MEDICINE | Facility: CLINIC | Age: 33
End: 2023-09-26
Payer: COMMERCIAL

## 2023-09-26 VITALS
RESPIRATION RATE: 18 BRPM | TEMPERATURE: 98.2 F | SYSTOLIC BLOOD PRESSURE: 137 MMHG | BODY MASS INDEX: 35.93 KG/M2 | DIASTOLIC BLOOD PRESSURE: 85 MMHG | WEIGHT: 289 LBS | HEIGHT: 75 IN | OXYGEN SATURATION: 97 % | HEART RATE: 82 BPM

## 2023-09-26 DIAGNOSIS — R42 DIZZINESS: Primary | ICD-10-CM

## 2023-09-26 DIAGNOSIS — Z11.4 SCREENING FOR HIV (HUMAN IMMUNODEFICIENCY VIRUS): ICD-10-CM

## 2023-09-26 DIAGNOSIS — Z11.59 NEED FOR HEPATITIS C SCREENING TEST: ICD-10-CM

## 2023-09-26 PROCEDURE — 90471 IMMUNIZATION ADMIN: CPT | Performed by: FAMILY MEDICINE

## 2023-09-26 PROCEDURE — 99214 OFFICE O/P EST MOD 30 MIN: CPT | Mod: 25 | Performed by: FAMILY MEDICINE

## 2023-09-26 PROCEDURE — 90686 IIV4 VACC NO PRSV 0.5 ML IM: CPT | Performed by: FAMILY MEDICINE

## 2023-09-26 RX ORDER — RISANKIZUMAB-RZAA 150 MG/ML
150 INJECTION SUBCUTANEOUS
COMMUNITY
End: 2024-03-28

## 2023-09-26 RX ORDER — LANCETS
EACH MISCELLANEOUS
Qty: 100 EACH | Refills: 6 | Status: SHIPPED | OUTPATIENT
Start: 2023-09-26 | End: 2024-03-28

## 2023-09-26 ASSESSMENT — ASTHMA QUESTIONNAIRES: ACT_TOTALSCORE: 22

## 2023-09-26 NOTE — PROGRESS NOTES
"  Assessment & Plan     Screening for HIV (human immunodeficiency virus)      Need for hepatitis C screening test      Dizziness  Unsure of the reason for his symptoms, this can be anxiety related given his hx of anxiety in the past, and panic attacks, at this time, I advise to check BP and blood sugar during these attacks, then send a message back in 2 weeks.   Although this can be side effects of Skyrizi pen, it is less likely given that the symptoms started 2 months after, and it is not a common side effect of the medicine.   If no improvement in 2 weeks, [facility].  - blood glucose monitoring (NO BRAND SPECIFIED) meter device kit; Use to test blood sugar 1 times daily or as directed. Preferred blood glucose meter OR supplies to accompany: Blood Glucose Monitor Brands: per insurance.  - blood glucose (NO BRAND SPECIFIED) test strip; Use to test blood sugar 1 times daily or as directed. To accompany: Blood Glucose Monitor Brands: per insurance.  - thin (NO BRAND SPECIFIED) lancets; Use with lanceting device. To accompany: Blood Glucose Monitor Brands: per insurance.             BMI:   Estimated body mass index is 36.61 kg/m  as calculated from the following:    Height as of this encounter: 1.892 m (6' 2.5\").    Weight as of this encounter: 131.1 kg (289 lb).   Weight management plan: Discussed healthy diet and exercise guidelines        Dalia Stark MD  [facility]    Noelle Nagy is a 33 year old, presenting for the following health issues:  Dizziness      9/26/2023    10:22 AM   Additional Questions   Roomed by Domenica Scott of Present Illness       Reason for visit:  Lightheadedness  Symptom onset:  3-4 weeks ago    He eats 0-1 servings of fruits and vegetables daily.He consumes 1 sweetened beverage(s) daily.He exercises with enough effort to increase his heart rate 9 or less minutes per day.  He exercises with enough effort to " increase his heart rate 3 or less days per week.   He is taking medications regularly.         Dizziness  Onset/Duration: 3-4 weeks  Description:   Do you feel faint: No but head feels heavy  Does it feel like the surroundings (bed, room) are moving: No  Unsteady/off balance: YES  Have you passed out or fallen: No  Intensity: mild  Progression of Symptoms: same  Accompanying Signs & Symptoms:  Heart palpitations or chest pain: No  Nausea, vomiting: YES  Weakness or lack of coordination in arms or legs: No  Vision or speech changes: No  Numbness or tingling: YES: in feet at night  Ringing in ears (Tinnitus): No  Hearing Loss: No  History:   Head trauma/concussion history: No  Previous similar symptoms: No  Recent bleeding history: No  Any new medications (BP?): YES- Skyrmanoj  Precipitating factors:   Worse with activity: YES  Worse with head movement: No  Alleviating factors:   Does staying in a fixed position give relief: YES  Therapies tried and outcome: Limiting sugar intake    Pt has been having woozy feeling usually when he is sitting down, mainly when he is on the computer, start with a flush sensation, then he zone out on something and time slows, then gets lightheaded, and it lasts for 20 minutes, so he lays down and he still feel foggy/whoozy, then the symptoms go away.  Pt went to urgent care and had labs done that were all normal, and EKG done that was within normal.            Review of Systems   CONSTITUTIONAL: NEGATIVE for fever, chills, change in weight  RESP: NEGATIVE for significant cough or SOB  CV: NEGATIVE for chest pain, palpitations or peripheral edema      Objective    There were no vitals taken for this visit.  There is no height or weight on file to calculate BMI.  Physical Exam   GENERAL: healthy, alert and no distress  RESP: lungs clear to auscultation - no rales, rhonchi or wheezes  CV: regular rate and rhythm, normal S1 S2, no S3 or S4, no murmur, click or rub, no peripheral edema and  peripheral pulses strong  NEURO: Normal strength and tone, sensory exam grossly normal, mentation intact, cranial nerves 2-12 intact, DTR's normal and symmetric , gait normal including heel/toe/tandem walking, Romberg normal, rapid alternating movements normal, and Xavier-Hallpike maneuver : negative on the both side.

## 2023-10-23 ENCOUNTER — HOSPITAL ENCOUNTER (EMERGENCY)
Facility: CLINIC | Age: 33
Discharge: HOME | End: 2023-10-23
Attending: EMERGENCY MEDICINE | Admitting: EMERGENCY MEDICINE
Payer: COMMERCIAL

## 2023-10-23 ENCOUNTER — APPOINTMENT (OUTPATIENT)
Dept: GENERAL RADIOLOGY | Facility: CLINIC | Age: 33
End: 2023-10-23
Attending: EMERGENCY MEDICINE
Payer: COMMERCIAL

## 2023-10-23 VITALS
RESPIRATION RATE: 19 BRPM | HEART RATE: 71 BPM | SYSTOLIC BLOOD PRESSURE: 138 MMHG | DIASTOLIC BLOOD PRESSURE: 79 MMHG | TEMPERATURE: 97.9 F | OXYGEN SATURATION: 96 %

## 2023-10-23 DIAGNOSIS — J98.01 ACUTE BRONCHOSPASM: ICD-10-CM

## 2023-10-23 DIAGNOSIS — R73.9 ELEVATED BLOOD SUGAR: ICD-10-CM

## 2023-10-23 LAB
ANION GAP SERPL CALCULATED.3IONS-SCNC: 10 MMOL/L (ref 7–15)
ATRIAL RATE - MUSE: 77 BPM
BASO+EOS+MONOS # BLD AUTO: NORMAL 10*3/UL
BASO+EOS+MONOS NFR BLD AUTO: NORMAL %
BASOPHILS # BLD AUTO: 0.1 10E3/UL (ref 0–0.2)
BASOPHILS NFR BLD AUTO: 1 %
BUN SERPL-MCNC: 12.5 MG/DL (ref 6–20)
CALCIUM SERPL-MCNC: 9 MG/DL (ref 8.6–10)
CHLORIDE SERPL-SCNC: 105 MMOL/L (ref 98–107)
CREAT SERPL-MCNC: 0.65 MG/DL (ref 0.67–1.17)
DEPRECATED HCO3 PLAS-SCNC: 25 MMOL/L (ref 22–29)
DIASTOLIC BLOOD PRESSURE - MUSE: NORMAL MMHG
EGFRCR SERPLBLD CKD-EPI 2021: >90 ML/MIN/1.73M2
EOSINOPHIL # BLD AUTO: 0.3 10E3/UL (ref 0–0.7)
EOSINOPHIL NFR BLD AUTO: 3 %
ERYTHROCYTE [DISTWIDTH] IN BLOOD BY AUTOMATED COUNT: 12.5 % (ref 10–15)
GLUCOSE SERPL-MCNC: 131 MG/DL (ref 70–99)
HCT VFR BLD AUTO: 45.3 % (ref 40–53)
HGB BLD-MCNC: 14.7 G/DL (ref 13.3–17.7)
HOLD SPECIMEN: NORMAL
IMM GRANULOCYTES # BLD: 0.1 10E3/UL
IMM GRANULOCYTES NFR BLD: 1 %
INTERPRETATION ECG - MUSE: NORMAL
LYMPHOCYTES # BLD AUTO: 2.7 10E3/UL (ref 0.8–5.3)
LYMPHOCYTES NFR BLD AUTO: 26 %
MCH RBC QN AUTO: 27.7 PG (ref 26.5–33)
MCHC RBC AUTO-ENTMCNC: 32.5 G/DL (ref 31.5–36.5)
MCV RBC AUTO: 86 FL (ref 78–100)
MONOCYTES # BLD AUTO: 0.9 10E3/UL (ref 0–1.3)
MONOCYTES NFR BLD AUTO: 9 %
NEUTROPHILS # BLD AUTO: 6.4 10E3/UL (ref 1.6–8.3)
NEUTROPHILS NFR BLD AUTO: 60 %
NRBC # BLD AUTO: 0 10E3/UL
NRBC BLD AUTO-RTO: 0 /100
P AXIS - MUSE: 48 DEGREES
PLATELET # BLD AUTO: 348 10E3/UL (ref 150–450)
POTASSIUM SERPL-SCNC: 3.6 MMOL/L (ref 3.4–5.3)
PR INTERVAL - MUSE: 136 MS
QRS DURATION - MUSE: 96 MS
QT - MUSE: 388 MS
QTC - MUSE: 439 MS
R AXIS - MUSE: 15 DEGREES
RBC # BLD AUTO: 5.3 10E6/UL (ref 4.4–5.9)
SODIUM SERPL-SCNC: 140 MMOL/L (ref 135–145)
SYSTOLIC BLOOD PRESSURE - MUSE: NORMAL MMHG
T AXIS - MUSE: 45 DEGREES
TROPONIN T SERPL HS-MCNC: <6 NG/L
VENTRICULAR RATE- MUSE: 77 BPM
WBC # BLD AUTO: 10.5 10E3/UL (ref 4–11)

## 2023-10-23 PROCEDURE — 99285 EMERGENCY DEPT VISIT HI MDM: CPT | Mod: 25

## 2023-10-23 PROCEDURE — 85014 HEMATOCRIT: CPT | Performed by: EMERGENCY MEDICINE

## 2023-10-23 PROCEDURE — 999N000156 HC STATISTIC RCP CONSULT EA 30 MIN

## 2023-10-23 PROCEDURE — 71046 X-RAY EXAM CHEST 2 VIEWS: CPT

## 2023-10-23 PROCEDURE — 84484 ASSAY OF TROPONIN QUANT: CPT | Performed by: EMERGENCY MEDICINE

## 2023-10-23 PROCEDURE — 93005 ELECTROCARDIOGRAM TRACING: CPT

## 2023-10-23 PROCEDURE — 36415 COLL VENOUS BLD VENIPUNCTURE: CPT | Performed by: EMERGENCY MEDICINE

## 2023-10-23 PROCEDURE — 80048 BASIC METABOLIC PNL TOTAL CA: CPT | Performed by: EMERGENCY MEDICINE

## 2023-10-23 RX ORDER — ALBUTEROL SULFATE 0.83 MG/ML
2.5 SOLUTION RESPIRATORY (INHALATION) EVERY 6 HOURS PRN
Qty: 75 ML | Refills: 0 | Status: SHIPPED | OUTPATIENT
Start: 2023-10-23 | End: 2024-03-28

## 2023-10-23 RX ORDER — ALBUTEROL SULFATE 90 UG/1
2 AEROSOL, METERED RESPIRATORY (INHALATION) EVERY 6 HOURS PRN
Qty: 18 G | Refills: 0 | Status: SHIPPED | OUTPATIENT
Start: 2023-10-23 | End: 2024-03-28

## 2023-10-23 ASSESSMENT — ACTIVITIES OF DAILY LIVING (ADL): ADLS_ACUITY_SCORE: 35

## 2023-10-23 NOTE — ED TRIAGE NOTES
Pt arrives with chest tightness and SOB that woke him from sleep. Pt has a hx of asthma with use of rescue inhalers. Tried rescue inhaler an zyrtec at home with minimal relief. Reports he does not feel wheezy just SOB with CP.      Triage Assessment (Adult)       Row Name 10/23/23 0543          Triage Assessment    Airway WDL WDL        Respiratory WDL    Respiratory WDL X;rhythm/pattern     Rhythm/Pattern, Respiratory shortness of breath;dyspnea upon exertion        Skin Circulation/Temperature WDL    Skin Circulation/Temperature WDL WDL        Cardiac WDL    Cardiac WDL X;chest pain        Chest Pain Assessment    Chest Pain Location midsternal     Character tightness     Precipitating Factors at rest     Alleviating Factors medications     Associated Signs/Symptoms dyspnea        Peripheral/Neurovascular WDL    Peripheral Neurovascular WDL WDL        Cognitive/Neuro/Behavioral WDL    Cognitive/Neuro/Behavioral WDL WDL

## 2023-10-23 NOTE — ED NOTES
Pt AOX4. Ambulatory. No distress noted. IV removed. VSS. Discharge paperwork reviewed with patient and partner in room. Discharge meds sent to pharmacy. Patient agreeable to plan.

## 2023-10-23 NOTE — ED NOTES
Pt was instructed in the proper use and benefits of the home nebulizer machine that they are being discharged with. They had no further questions on the use, and was able to demonstrate and understand the instructions given.       Freedom Avitia, RT

## 2023-10-23 NOTE — ED PROVIDER NOTES
History     Chief Complaint:  Dyspnea, chest tightness       HPI   Yakov Cantrell is a 33 year old male with history of asthma who presents with shortness of breath and chest tightness.  The patient notes that he woke up suddenly this morning with a sense of shortness of breath.  He had run out of his albuterol inhaler and/or cannot find it, so use his wife's and give himself a total of 8 puffs.  With minimal symptomatic relief, and developing chest tightness with inspiration, he woke up his wife.  She listened to his lungs and did not hear wheezing.  He presented for further further evaluation and also took an antiallergy medication prior to arrival.  He denies fevers, chills, cough, abdominal pain, back pain, or any other concerns.  Both he and his wife had a URI approximately 2 weeks ago.        Independent Historian:   None    Review of External Notes: Reviewed 9/26/2023 office visit    ROS:  Review of Systems    Allergies:  Amoxicillin  Sulfa Antibiotics  Vancomycin     Medications:    albuterol (PROAIR HFA/PROVENTIL HFA/VENTOLIN HFA) 108 (90 Base) MCG/ACT inhaler  blood glucose (NO BRAND SPECIFIED) test strip  blood glucose monitoring (NO BRAND SPECIFIED) meter device kit  Risankizumab-rzaa (SKYRIZI PEN) 150 MG/ML subcutaneous  thin (NO BRAND SPECIFIED) lancets        Past History:    Past Medical History:   Diagnosis Date    CARDIOVASCULAR SCREENING; LDL GOAL LESS THAN 160 6/19/2014    Cirrhosis (H)     Mild persistent asthma, unspecified whether complicated 6/19/2018         Physical Exam     Patient Vitals for the past 24 hrs:   BP Temp Temp src Pulse Resp SpO2   05/12/23 0247 113/73 97.8  F (36.6  C) Temporal 95 20 98 %        Physical Exam  Constitutional: Alert, attentive  HENT:    Nose: Nose normal.    Mouth/Throat: Oropharynx is clear, mucous membranes are moist   Eyes: EOM are normal.   CV: regular rate and rhythm; no murmurs, rubs or gallups  Chest: Effort normal and breath sounds normal.   GI:   There is no tenderness. No distension. Normal bowel sounds  MSK: Normal range of motion.   Neurological: Alert, attentive  Skin: Skin is warm and dry.      Emergency Department Course   ECG  Normal sinus rhythm, rate 77, no ST/T abnormality, no significant change from 8/25/2022    Results for orders placed or performed during the hospital encounter of 10/23/23   XR Chest 2 Views     Status: None    Narrative    EXAM: XR CHEST 2 VIEWS  LOCATION: [facility]  DATE: 10/23/2023    INDICATION: chest pain  COMPARISON: 08/25/2022      Impression    IMPRESSION: Negative chest.   Extra Tube ([place] Draw)     Status: None (In process)    Narrative    The following orders were created for panel order Extra Tube ([place] Draw).  Procedure                               Abnormality         Status                     ---------                               -----------         ------                     Extra Blue Top Tube[[id number]]                              In process                 Extra Green Top (Lithium...[[id number]]                      In process                 Extra Purple Top Tube[[id number]]                            In process                   Please view results for these tests on the individual orders.   Basic metabolic panel     Status: Abnormal   Result Value Ref Range    Sodium 140 135 - 145 mmol/L    Potassium 3.6 3.4 - 5.3 mmol/L    Chloride 105 98 - 107 mmol/L    Carbon Dioxide (CO2) 25 22 - 29 mmol/L    Anion Gap 10 7 - 15 mmol/L    Urea Nitrogen 12.5 6.0 - 20.0 mg/dL    Creatinine 0.65 (L) 0.67 - 1.17 mg/dL    GFR Estimate >90 >60 mL/min/1.73m2    Calcium 9.0 8.6 - 10.0 mg/dL    Glucose 131 (H) 70 - 99 mg/dL   Troponin T, High Sensitivity     Status: Normal   Result Value Ref Range    Troponin T, High Sensitivity <6 <=22 ng/L   CBC with platelets and differential     Status: None   Result Value Ref Range    WBC Count 10.5 4.0 - 11.0 10e3/uL    RBC Count 5.30 4.40 - 5.90 10e6/uL     Hemoglobin 14.7 13.3 - 17.7 g/dL    Hematocrit 45.3 40.0 - 53.0 %    MCV 86 78 - 100 fL    MCH 27.7 26.5 - 33.0 pg    MCHC 32.5 31.5 - 36.5 g/dL    RDW 12.5 10.0 - 15.0 %    Platelet Count 348 150 - 450 10e3/uL    % Neutrophils 60 %    % Lymphocytes 26 %    % Monocytes 9 %    Mids % (Monos, Eos, Basos)      % Eosinophils 3 %    % Basophils 1 %    % Immature Granulocytes 1 %    NRBCs per 100 WBC 0 <1 /100    Absolute Neutrophils 6.4 1.6 - 8.3 10e3/uL    Absolute Lymphocytes 2.7 0.8 - 5.3 10e3/uL    Absolute Monocytes 0.9 0.0 - 1.3 10e3/uL    Mids Abs (Monos, Eos, Basos)      Absolute Eosinophils 0.3 0.0 - 0.7 10e3/uL    Absolute Basophils 0.1 0.0 - 0.2 10e3/uL    Absolute Immature Granulocytes 0.1 <=0.4 10e3/uL    Absolute NRBCs 0.0 10e3/uL   EKG 12-lead, tracing only     Status: None   Result Value Ref Range    Systolic Blood Pressure  mmHg    Diastolic Blood Pressure  mmHg    Ventricular Rate 77 BPM    Atrial Rate 77 BPM    NH Interval 136 ms    QRS Duration 96 ms     ms    QTc 439 ms    P Axis 48 degrees    R AXIS 15 degrees    T Axis 45 degrees    Interpretation ECG       Sinus rhythm  Normal ECG  When compared with ECG of 25-AUG-2022 09:26,  No significant change was found  Confirmed by - EMERGENCY ROOM, PHYSICIAN ([id number]),  ESTIVEN OCONNELL (Vinayak) on 10/23/2023 6:54:36 AM     CBC with platelets differential     Status: None    Narrative    The following orders were created for panel order CBC with platelets differential.  Procedure                               Abnormality         Status                     ---------                               -----------         ------                     CBC with platelets and d...[[id number]]                      Final result                 Please view results for these tests on the individual orders.       Emergency Department Course & Assessments:             Independent Interpretation (X-rays, CTs, rhythm strip):  No pneumothorax on chest x-ray          Disposition:  The patient was discharged to home.     Impression & Plan      Medical Decision Making:  This is a pleasant 33-year-old male with history of asthma, currently out of medications, who presents for evaluation of shortness of breath and chest tightness.  On further reflection, the patient notes that this did feel like his wheezing that he experiences with hayfever.  Symptoms remain resolved in the ED and exam remains unremarkable.  Chest x-ray shows no widened mediastinum, pneumothorax, pneumonia.  His EKG and undetectable high-sensitivity troponin essentially rule out AMI and atypical contacts.  He is PERC negative, since ruling out PE.  Plan refill of albuterol medications as requested.  Primary care follow-up in 1 week, and return precautions for chest pain, shortness of breath, or any other concerns.  Discussed the patient does have mildly elevated blood sugar, worthwhile to follow-up as an outpatient.  He notes that he has been monitoring this at home and did have sweats last night.          Diagnosis:  Visit Diagnosis, Associated Orders, and Comments     ICD-10-CM    1. Acute bronchospasm  J98.01 Compressor Nebulizer           2. Elevated blood sugar  R73.9              Boy Woods MD  10/23/23 0658

## 2023-12-14 ENCOUNTER — TELEPHONE (OUTPATIENT)
Dept: FAMILY MEDICINE | Facility: CLINIC | Age: 33
End: 2023-12-14

## 2023-12-14 ENCOUNTER — OFFICE VISIT (OUTPATIENT)
Dept: FAMILY MEDICINE | Facility: CLINIC | Age: 33
End: 2023-12-14
Payer: COMMERCIAL

## 2023-12-14 VITALS
RESPIRATION RATE: 18 BRPM | OXYGEN SATURATION: 97 % | HEART RATE: 94 BPM | DIASTOLIC BLOOD PRESSURE: 84 MMHG | TEMPERATURE: 97.8 F | WEIGHT: 305 LBS | SYSTOLIC BLOOD PRESSURE: 136 MMHG | BODY MASS INDEX: 37.92 KG/M2 | HEIGHT: 75 IN

## 2023-12-14 DIAGNOSIS — K21.00 GASTROESOPHAGEAL REFLUX DISEASE WITH ESOPHAGITIS WITHOUT HEMORRHAGE: ICD-10-CM

## 2023-12-14 DIAGNOSIS — R22.1 LUMP IN THROAT: Primary | ICD-10-CM

## 2023-12-14 PROCEDURE — 99213 OFFICE O/P EST LOW 20 MIN: CPT | Performed by: FAMILY MEDICINE

## 2023-12-14 RX ORDER — OMEPRAZOLE 40 MG/1
40 CAPSULE, DELAYED RELEASE ORAL DAILY
Qty: 30 CAPSULE | Refills: 0 | Status: SHIPPED | OUTPATIENT
Start: 2023-12-14 | End: 2024-01-09

## 2023-12-14 ASSESSMENT — ENCOUNTER SYMPTOMS
COUGH: 0
ACTIVITY CHANGE: 0
HEADACHES: 0
ARTHRALGIAS: 0
ABDOMINAL DISTENTION: 0
AGITATION: 0

## 2023-12-14 NOTE — TELEPHONE ENCOUNTER
Forms/Letter Request    Type of form/letter: Work    Have you been seen for this request: Yes 12/14/2023      Do we have the form/letter: Yes: patient requesting a letter for work     When is form/letter needed by: ASAP    How would you like the form/letter returned: Place orders within Epic    Patient Notified form requests are processed in 3-5 business days:Yes    Could we send this information to you in [facility] or would you prefer to receive a phone call?:   Patient would prefer a phone call   Okay to leave a detailed message?: Yes at Cell number on file:    Telephone Information:   Mobile [phone]

## 2023-12-14 NOTE — PROGRESS NOTES
"  Assessment & Plan     Lump in throat    - CT Soft Tissue Neck w/o & w Contrast; Future  Patient feels lump in his throat since 4 weeks .  No lump was palpable on clinical exam       We discussed about possibility of globus sensation   Will Obtain scan if negative can consider ENT consultation .     Gastroesophageal reflux disease with esophagitis without hemorrhage  - omeprazole (PRILOSEC) 40 MG DR capsule; Take 1 capsule (40 mg) by mouth daily  6}         Yuliana Cummings MD  [facility] is a 33 year old, presenting for the following health issues:  Throat Problem        12/14/2023    12:45 PM   Additional Questions   Roomed by Sandra       HPI       Feels like a lump in throat  3-4 weeks  Can feel it when swallowing,     Started taking omeprazole and Zyrtec to help        Review of Systems   Constitutional:  Negative for activity change.   HENT:  Negative for congestion.    Respiratory:  Negative for cough.    Cardiovascular:  Negative for chest pain.   Gastrointestinal:  Negative for abdominal distention.   Musculoskeletal:  Negative for arthralgias.   Neurological:  Negative for headaches.   Psychiatric/Behavioral:  Negative for agitation and behavioral problems.             Objective    /84   Pulse 94   Temp 97.8  F (36.6  C) (Tympanic)   Resp 18   Ht 1.892 m (6' 2.5\")   Wt 138.3 kg (305 lb)   SpO2 97%   BMI 38.64 kg/m    Body mass index is 38.64 kg/m .  Physical Exam  Neck:      Comments: Globus sensation in the throat .  Musculoskeletal:      Cervical back: Normal range of motion.   Neurological:      General: No focal deficit present.      Mental Status: He is alert.   Psychiatric:         Mood and Affect: Mood normal.                      "

## 2023-12-14 NOTE — LETTER
December 14, 2023      Yakov Cantrell  [address]  [place] [address]        To Whom It May Concern:    Yakov Cantrell was seen in our clinic on 12/14/2023 .  Please excuse patient on 12 /14/2023 .   He may return to work without restrictions.      Sincerely,        Yuliana Cummings MD

## 2023-12-19 ENCOUNTER — HOSPITAL ENCOUNTER (OUTPATIENT)
Dept: CT IMAGING | Facility: CLINIC | Age: 33
Discharge: HOME | End: 2023-12-19
Attending: FAMILY MEDICINE | Admitting: FAMILY MEDICINE
Payer: COMMERCIAL

## 2023-12-19 DIAGNOSIS — R22.1 LUMP IN THROAT: ICD-10-CM

## 2023-12-19 PROCEDURE — 70492 CT SFT TSUE NCK W/O & W/DYE: CPT

## 2023-12-19 PROCEDURE — 250N000009 HC RX 250: Performed by: FAMILY MEDICINE

## 2023-12-19 PROCEDURE — 250N000011 HC RX IP 250 OP 636: Performed by: FAMILY MEDICINE

## 2023-12-19 RX ORDER — IOPAMIDOL 755 MG/ML
90 INJECTION, SOLUTION INTRAVASCULAR ONCE
Status: COMPLETED | OUTPATIENT
Start: 2023-12-19 | End: 2023-12-19

## 2023-12-19 RX ADMIN — IOPAMIDOL 90 ML: 755 INJECTION, SOLUTION INTRAVENOUS at 08:39

## 2023-12-19 RX ADMIN — SODIUM CHLORIDE 60 ML: 9 INJECTION, SOLUTION INTRAVENOUS at 08:39

## 2023-12-20 DIAGNOSIS — Q89.2 THYROGLOSSAL CYST: Primary | ICD-10-CM

## 2024-01-09 DIAGNOSIS — K21.00 GASTROESOPHAGEAL REFLUX DISEASE WITH ESOPHAGITIS WITHOUT HEMORRHAGE: ICD-10-CM

## 2024-01-10 RX ORDER — OMEPRAZOLE 40 MG/1
40 CAPSULE, DELAYED RELEASE ORAL DAILY
Qty: 90 CAPSULE | Refills: 0 | Status: SHIPPED | OUTPATIENT
Start: 2024-01-10 | End: 2024-03-28

## 2024-02-03 ENCOUNTER — TRANSFERRED RECORDS (OUTPATIENT)
Dept: HEALTH INFORMATION MANAGEMENT | Facility: CLINIC | Age: 34
End: 2024-02-03
Payer: COMMERCIAL

## 2024-03-16 ENCOUNTER — HEALTH MAINTENANCE LETTER (OUTPATIENT)
Age: 34
End: 2024-03-16

## 2024-03-28 ENCOUNTER — OFFICE VISIT (OUTPATIENT)
Dept: URGENT CARE | Facility: URGENT CARE | Age: 34
End: 2024-03-28
Payer: COMMERCIAL

## 2024-03-28 ENCOUNTER — NURSE TRIAGE (OUTPATIENT)
Dept: FAMILY MEDICINE | Facility: CLINIC | Age: 34
End: 2024-03-28

## 2024-03-28 VITALS
TEMPERATURE: 98.5 F | OXYGEN SATURATION: 98 % | WEIGHT: 315 LBS | HEART RATE: 88 BPM | RESPIRATION RATE: 16 BRPM | SYSTOLIC BLOOD PRESSURE: 154 MMHG | BODY MASS INDEX: 40.16 KG/M2 | DIASTOLIC BLOOD PRESSURE: 90 MMHG

## 2024-03-28 DIAGNOSIS — R73.03 PREDIABETES: ICD-10-CM

## 2024-03-28 DIAGNOSIS — L40.9 PSORIASIS: ICD-10-CM

## 2024-03-28 DIAGNOSIS — I10 BENIGN ESSENTIAL HYPERTENSION: ICD-10-CM

## 2024-03-28 DIAGNOSIS — R42 LIGHTHEADEDNESS: Primary | ICD-10-CM

## 2024-03-28 DIAGNOSIS — K21.00 GASTROESOPHAGEAL REFLUX DISEASE WITH ESOPHAGITIS WITHOUT HEMORRHAGE: ICD-10-CM

## 2024-03-28 LAB
BASOPHILS # BLD AUTO: 0.1 10E3/UL (ref 0–0.2)
BASOPHILS NFR BLD AUTO: 1 %
EOSINOPHIL # BLD AUTO: 0.4 10E3/UL (ref 0–0.7)
EOSINOPHIL NFR BLD AUTO: 4 %
ERYTHROCYTE [DISTWIDTH] IN BLOOD BY AUTOMATED COUNT: 12.4 % (ref 10–15)
HCT VFR BLD AUTO: 47.9 % (ref 40–53)
HGB BLD-MCNC: 15.3 G/DL (ref 13.3–17.7)
IMM GRANULOCYTES # BLD: 0.1 10E3/UL
IMM GRANULOCYTES NFR BLD: 1 %
LYMPHOCYTES # BLD AUTO: 2.3 10E3/UL (ref 0.8–5.3)
LYMPHOCYTES NFR BLD AUTO: 24 %
MCH RBC QN AUTO: 27.4 PG (ref 26.5–33)
MCHC RBC AUTO-ENTMCNC: 31.9 G/DL (ref 31.5–36.5)
MCV RBC AUTO: 86 FL (ref 78–100)
MONOCYTES # BLD AUTO: 1 10E3/UL (ref 0–1.3)
MONOCYTES NFR BLD AUTO: 10 %
NEUTROPHILS # BLD AUTO: 6 10E3/UL (ref 1.6–8.3)
NEUTROPHILS NFR BLD AUTO: 61 %
PLATELET # BLD AUTO: 315 10E3/UL (ref 150–450)
RBC # BLD AUTO: 5.59 10E6/UL (ref 4.4–5.9)
WBC # BLD AUTO: 10 10E3/UL (ref 4–11)

## 2024-03-28 PROCEDURE — 80048 BASIC METABOLIC PNL TOTAL CA: CPT | Performed by: PHYSICIAN ASSISTANT

## 2024-03-28 PROCEDURE — 99214 OFFICE O/P EST MOD 30 MIN: CPT | Performed by: PHYSICIAN ASSISTANT

## 2024-03-28 PROCEDURE — 85025 COMPLETE CBC W/AUTO DIFF WBC: CPT | Performed by: PHYSICIAN ASSISTANT

## 2024-03-28 PROCEDURE — 36415 COLL VENOUS BLD VENIPUNCTURE: CPT | Performed by: PHYSICIAN ASSISTANT

## 2024-03-28 RX ORDER — ALBUTEROL SULFATE 0.83 MG/ML
2.5 SOLUTION RESPIRATORY (INHALATION) EVERY 6 HOURS PRN
COMMUNITY
Start: 2024-03-28

## 2024-03-28 RX ORDER — ALBUTEROL SULFATE 90 UG/1
2 AEROSOL, METERED RESPIRATORY (INHALATION) EVERY 6 HOURS PRN
COMMUNITY
Start: 2024-03-28

## 2024-03-28 RX ORDER — CLOBETASOL PROPIONATE 0.5 MG/G
CREAM TOPICAL 2 TIMES DAILY
Qty: 45 G | Refills: 0 | Status: SHIPPED | OUTPATIENT
Start: 2024-03-28

## 2024-03-28 RX ORDER — OMEPRAZOLE 40 MG/1
40 CAPSULE, DELAYED RELEASE ORAL DAILY
Qty: 90 CAPSULE | Refills: 0 | Status: SHIPPED | OUTPATIENT
Start: 2024-03-28

## 2024-03-28 RX ORDER — HYDROCHLOROTHIAZIDE 12.5 MG/1
12.5 TABLET ORAL DAILY
Qty: 90 TABLET | Refills: 0 | Status: SHIPPED | OUTPATIENT
Start: 2024-03-28 | End: 2024-04-09

## 2024-03-28 ASSESSMENT — ENCOUNTER SYMPTOMS
LIGHT-HEADEDNESS: 1
EYE ITCHING: 0
SHORTNESS OF BREATH: 0
COUGH: 0
FACIAL ASYMMETRY: 0
BLOOD IN STOOL: 0
PALPITATIONS: 0
RHINORRHEA: 0
DYSURIA: 0
DIFFICULTY URINATING: 0
DIARRHEA: 0
HEMATURIA: 0
WEAKNESS: 0
SEIZURES: 0
VOMITING: 0

## 2024-03-28 NOTE — TELEPHONE ENCOUNTER
Called patient and advised of below.  No clinic appts til Tuesday.  Advised UC due to Dr. Stark's recommendation and symptoms.  He states he was on bp medications and lost weight so med was stopped and now gained weight back and feels needs bp med again.  Discussed he was on hydrochlorothiazide 12.5 mg was last med he was on for bp.  Patient will go to [facility].  Nhi Dykes RN

## 2024-03-28 NOTE — PATIENT INSTRUCTIONS
Your blood pressure is high. You can restart hydrochlorothiazide.     I'm getting lab work to see if anything is off that could be causing your issues.     Your allergy symptoms are mild and most likely not causing these symptoms, but take Zyrtec as needed.     You have no signs of a stroke or heart attack at this time.

## 2024-03-28 NOTE — TELEPHONE ENCOUNTER
Either an appointment with me tomorrow, or UC today.  Provider Response to 2nd Level Triage Request

## 2024-03-28 NOTE — TELEPHONE ENCOUNTER
"Nurse Triage SBAR    Is this a 2nd Level Triage? YES, LICENSED PRACTITIONER REVIEW IS REQUIRED    Situation: Patient states he is feeling lightheaded. Initially he states he has not checked BP.    Background: Had in the past but usually lasts 10-20 minutes, not days.    Assessment: Patient states symptoms began 3 days ago. He is having lightheadedness on and off.     He thinks it is BP related. Has regained weight recently.     Checked BP while on the phone. He notes his anxiety goes up when he checks BP. BP at 12:50 pm 178/106, HR 81. Sitting down. Rechecked at 12:55 pm 162/104, HR 89. He states he is not sure that is accurate as his BP goes up when he is anxious.     Having palpitations last couple of days at random times. He is not having any right now.    Has a headache, but states it is \"not a typical headache. It is not a pain - feels like head is stuffy\".     Room is not spinning or tilting. He is able to walk.     He is having a hard time focusing on his computer screen at work.    Denies weakness, numbness and tingling, slurred speech, shortness of breath, chest pain, blurred vision.    He states he is not drinking as much water as should. Drinks a couple of bottles of water every day.     He states he will go to ER if needed but he would prefer not to. He asked if PCP can see him?     Protocol Recommended Disposition:   Go To ED/UCC Now (Or To Office With PCP Approval), See in Office Today    Recommendation:   Please advise if able to get patient in today or if patient should go to UC/ADS/ED. Thank you.     Routed to provider    Does the patient meet one of the following criteria for ADS visit consideration? 16+ years old, with an [facility] PCP     TIP  Providers, please consider if this condition is appropriate for management at one of our Acute and Diagnostic Services sites.     If patient is a good candidate, please use dotphrase <dot>triageresponse and select Refer to ADS to document.    Petra Hill, " RN, BSN, PHN  [facility]  Nurse Triage, [facility]    Reason for Disposition   MODERATE dizziness (e.g., interferes with normal activities)  (Exception: Dizziness caused by heat exposure, sudden standing, or poor fluid intake.)   Patient sounds very sick or weak to the triager    Additional Information   Negative: SEVERE difficulty breathing (e.g., struggling for each breath, speaks in single words)   Negative: Shock suspected (e.g., cold/pale/clammy skin, too weak to stand, low BP, rapid pulse)   Negative: Difficult to awaken or acting confused (e.g., disoriented, slurred speech)   Negative: Fainted, and still feels dizzy afterwards   Negative: Overdose (accidental or intentional) of medications   Negative: New neurologic deficit that is present now: * Weakness of the face, arm, or leg on one side of the body * Numbness of the face, arm, or leg on one side of the body * Loss of speech or garbled speech   Negative: Heart beating < 50 beats per minute OR > 140 beats per minute   Negative: Sounds like a life-threatening emergency to the triager   Negative: Chest pain   Negative: Rectal bleeding, bloody stool, or tarry-black stool   Negative: Vomiting is main symptom   Negative: Diarrhea is main symptom   Negative: Headache is main symptom   Negative: Heat exhaustion suspected (i.e., dehydration from heat exposure)   Negative: Patient states that they are having an anxiety or panic attack   Negative: Dizziness from low blood sugar (i.e., < 60 mg/dl or 3.5 mmol/l)   Negative: SEVERE dizziness (e.g., unable to stand, requires support to walk, feels like passing out now)   Negative: SEVERE headache or neck pain   Negative: Spinning or tilting sensation (vertigo) present now and one or more stroke risk factors (i.e., hypertension, diabetes mellitus, prior stroke/TIA, heart attack, age over 60) (Exception: Prior physician evaluation for this AND no different/worse than usual.)   Negative:  Neurologic deficit that was brief (now gone), ANY of the following:* Weakness of the face, arm, or leg on one side of the body* Numbness of the face, arm, or leg on one side of the body* Loss of speech or garbled speech   Negative: Loss of vision or double vision  (Exception: Similar to previous migraines.)   Negative: Extra heartbeats, irregular heart beating, or heart is beating very fast (i.e., 'palpitations')   Negative: Difficulty breathing   Negative: Drinking very little and dehydration suspected (e.g., no urine > 12 hours, very dry mouth, very lightheaded)   Negative: Follows bleeding (e.g., stomach, rectum, vagina)  (Exception: Became dizzy from sight of small amount blood.)   Negative: Patient sounds very sick or weak to the triager   Negative: Lightheadedness (dizziness) present now, after 2 hours of rest and fluids   Negative: Spinning or tilting sensation (vertigo) present now   Negative: Fever > 103 F (39.4 C)   Negative: Fever > 100.0 F (37.8 C) and has diabetes mellitus or a weak immune system (e.g., HIV positive, cancer chemotherapy, organ transplant, splenectomy, chronic steroids)   Negative: Sounds like a life-threatening emergency to the triager   Negative: Symptom is main concern (e.g., headache, chest pain)   Negative: Low blood pressure is main concern   Negative: Systolic BP >= 160 OR Diastolic >= 100, and any cardiac (e.g., breathing difficulty, chest pain) or neurologic symptoms (e.g., new-onset blurred or double vision)   Negative: Pregnant 20 or more weeks (or postpartum < 6 weeks) with new hand or face swelling   Negative: Pregnant 20 or more weeks (or postpartum < 6 weeks) and Systolic BP >= 160 OR Diastolic >= 110    Protocols used: Dizziness-A-OH, Blood Pressure - High-A-OH

## 2024-03-28 NOTE — PROGRESS NOTES
Assessment & Plan:        ICD-10-CM    1. Lightheadedness  R42 CBC with platelets and differential     Basic metabolic panel  (Ca, Cl, CO2, Creat, Gluc, K, Na, BUN)     CBC with platelets and differential     Basic metabolic panel  (Ca, Cl, CO2, Creat, Gluc, K, Na, BUN)      2. Benign essential hypertension  I10 CBC with platelets and differential     Basic metabolic panel  (Ca, Cl, CO2, Creat, Gluc, K, Na, BUN)     hydroCHLOROthiazide 12.5 MG tablet     CBC with platelets and differential     Basic metabolic panel  (Ca, Cl, CO2, Creat, Gluc, K, Na, BUN)      3. Prediabetes  R73.03 CBC with platelets and differential     Basic metabolic panel  (Ca, Cl, CO2, Creat, Gluc, K, Na, BUN)     CBC with platelets and differential     Basic metabolic panel  (Ca, Cl, CO2, Creat, Gluc, K, Na, BUN)      4. Gastroesophageal reflux disease with esophagitis without hemorrhage  K21.00 omeprazole (PRILOSEC) 40 MG DR capsule      5. Psoriasis  L40.9 clobetasol propionate (TEMOVATE) 0.05 % external cream            Plan/Clinical Decision Making    1) Lightheadedness  Patient with history of HTN and prediabetes. No recent trauma. No fever or recent illness.   Normal neurological exam. Normal lung, heart exam.   CBC, BMP obtained.  Discussed quitting marijuana use to see if that helps with lightheadedness.     2) HTN  Not treated at this time.   Will re-start hydrochlorothiazide 12.5mg daily.   Schedule BP Follow-up with PCP and recommend wellness/physical.     3) GERD  Needs refill of omeprazole.   Refill given and can schedule with PCP for further refills.     4) Psoriasis  Not on any medications.   Due for follow up with dermatologist.   Will treat with steroid cream and he can Follow-up with derm for further treatment.     Patient Instructions   Your blood pressure is high. You can restart hydrochlorothiazide.     I'm getting lab work to see if anything is off that could be causing your issues.     Your allergy symptoms are mild  and most likely not causing these symptoms, but take Zyrtec as needed.     You have no signs of a stroke or heart attack at this time.       Return if symptoms worsen or fail to improve, for in 3-5 days.     At the end of the encounter, I discussed results, diagnosis, medications. Discussed red flags for immediate return to clinic/ER, as well as indications for follow up if no improvement. Patient understood and agreed to plan. Patient was stable for discharge.        Jacquelyn Yu PA-C on 3/28/2024 at 3:11 PM          Subjective:     HPI:    Yakov is a 33 year old male who presents to clinic today for the following health issues:  Chief Complaint   Patient presents with    Dizziness     Dizzy,  lightheadedness x 2-3 days -- nothing new introduced- trying to lose weight and decreasing his calories    Medication Refill     Refill on prilosec     HPI    Feeling lightheaded for the last couple of days. Last night smoked some marijuana and though he possibly was was having side effects.   Today at work has felt fogginess, lightheadedness.   Was on blood pressure medication. Lost weight and went off, but gained back.   No recent illness or fever. No recent trauma, no head injury or recent falls.     SH: has a bit of stress, rare alcohol.   Former smoker, marijuana use at night.     Review of Systems   HENT:  Positive for congestion (mild). Negative for rhinorrhea and sneezing.    Eyes:  Negative for itching and visual disturbance.   Respiratory:  Negative for cough and shortness of breath.    Cardiovascular:  Negative for chest pain, palpitations and leg swelling.   Gastrointestinal:  Negative for blood in stool, diarrhea and vomiting.   Genitourinary:  Negative for difficulty urinating, dysuria and hematuria.   Neurological:  Positive for light-headedness. Negative for seizures, syncope, facial asymmetry and weakness.         Patient Active Problem List   Diagnosis    CARDIOVASCULAR SCREENING; LDL GOAL LESS THAN 160     Herpes zoster without complication    Mild persistent asthma, unspecified whether complicated    Allergic rhinitis    Morbid obesity (H)    Sebaceous cyst    Atypical chest pain    Cannabis dependence, episodic use (H)    Anxiety        Past Medical History:   Diagnosis Date    CARDIOVASCULAR SCREENING; LDL GOAL LESS THAN 160 6/19/2014    Cirrhosis (H)     Skin, Hands    Mild persistent asthma, unspecified whether complicated 6/19/2018       Social History     Tobacco Use    Smoking status: Former     Packs/day: 0.00     Years: 0.00     Additional pack years: 0.00     Total pack years: 0.00     Types: Cigarettes, Other    Smokeless tobacco: Never    Tobacco comments:     bum cigs socially    Substance Use Topics    Alcohol use: Yes     Alcohol/week: 0.0 standard drinks of alcohol     Comment: rarely, 1-2 a month             Objective:     Vitals:    03/28/24 1456   BP: (!) 154/90   BP Location: Right arm   Patient Position: Chair   Cuff Size: Adult Large   Pulse: 88   Resp: 16   Temp: 98.5  F (36.9  C)   TempSrc: Oral   SpO2: 98%   Weight: 143.8 kg (317 lb)         Physical Exam   EXAM:   Pleasant, alert, appropriate appearance. NAD.  Head Exam: Normocephalic, atraumatic.  Eye Exam:  PERRLA, EOMI, non icteric/injection.    Ear Exam: TMs grey without bulging. Normal canals.  Normal pinna.  Nose Exam: Normal external nose.    OroPharynx Exam:  Moist mucous membranes. No erythema, pharynx without exudate or hypertrophy.  Neck/Thyroid Exam:  No LAD.    Chest/Respiratory Exam: CTAB.  Cardiovascular Exam: RRR. No murmur or rubs.  ABD: soft, Non-tender, normal bowel sounds, no rebound/guarding.  No masses/organomegaly.  Ext/musculoskeletal: Grossly intact, No edema  Neuro: CN II-XII intact grossly intact.  Skin: psoriasis plaques on hands.       Results:  Results for orders placed or performed in visit on 03/28/24   Basic metabolic panel  (Ca, Cl, CO2, Creat, Gluc, K, Na, BUN)     Status: Normal   Result Value Ref Range     Sodium 140 135 - 145 mmol/L    Potassium 4.4 3.4 - 5.3 mmol/L    Chloride 102 98 - 107 mmol/L    Carbon Dioxide (CO2) 27 22 - 29 mmol/L    Anion Gap 11 7 - 15 mmol/L    Urea Nitrogen 11.8 6.0 - 20.0 mg/dL    Creatinine 0.77 0.67 - 1.17 mg/dL    GFR Estimate >90 >60 mL/min/1.73m2    Calcium 9.3 8.6 - 10.0 mg/dL    Glucose 92 70 - 99 mg/dL   CBC with platelets and differential     Status: None   Result Value Ref Range    WBC Count 10.0 4.0 - 11.0 10e3/uL    RBC Count 5.59 4.40 - 5.90 10e6/uL    Hemoglobin 15.3 13.3 - 17.7 g/dL    Hematocrit 47.9 40.0 - 53.0 %    MCV 86 78 - 100 fL    MCH 27.4 26.5 - 33.0 pg    MCHC 31.9 31.5 - 36.5 g/dL    RDW 12.4 10.0 - 15.0 %    Platelet Count 315 150 - 450 10e3/uL    % Neutrophils 61 %    % Lymphocytes 24 %    % Monocytes 10 %    % Eosinophils 4 %    % Basophils 1 %    % Immature Granulocytes 1 %    Absolute Neutrophils 6.0 1.6 - 8.3 10e3/uL    Absolute Lymphocytes 2.3 0.8 - 5.3 10e3/uL    Absolute Monocytes 1.0 0.0 - 1.3 10e3/uL    Absolute Eosinophils 0.4 0.0 - 0.7 10e3/uL    Absolute Basophils 0.1 0.0 - 0.2 10e3/uL    Absolute Immature Granulocytes 0.1 <=0.4 10e3/uL   CBC with platelets and differential     Status: None    Narrative    The following orders were created for panel order CBC with platelets and differential.  Procedure                               Abnormality         Status                     ---------                               -----------         ------                     CBC with platelets and d...[[id number]]                      Final result                 Please view results for these tests on the individual orders.

## 2024-03-28 NOTE — LETTER
March 28, 2024      Yakov Cantrell  [address]  [facility] [address]        To Whom It May Concern:    Yakov LIPSCOMB Óscar  was seen today.  Please excuse him from work today and an additional 1-2 days as needed due to medical condition.        Sincerely,        Jacquelyn Yu PA-C

## 2024-03-29 LAB
ANION GAP SERPL CALCULATED.3IONS-SCNC: 11 MMOL/L (ref 7–15)
BUN SERPL-MCNC: 11.8 MG/DL (ref 6–20)
CALCIUM SERPL-MCNC: 9.3 MG/DL (ref 8.6–10)
CHLORIDE SERPL-SCNC: 102 MMOL/L (ref 98–107)
CREAT SERPL-MCNC: 0.77 MG/DL (ref 0.67–1.17)
DEPRECATED HCO3 PLAS-SCNC: 27 MMOL/L (ref 22–29)
EGFRCR SERPLBLD CKD-EPI 2021: >90 ML/MIN/1.73M2
GLUCOSE SERPL-MCNC: 92 MG/DL (ref 70–99)
POTASSIUM SERPL-SCNC: 4.4 MMOL/L (ref 3.4–5.3)
SODIUM SERPL-SCNC: 140 MMOL/L (ref 135–145)

## 2024-04-09 ENCOUNTER — OFFICE VISIT (OUTPATIENT)
Dept: FAMILY MEDICINE | Facility: CLINIC | Age: 34
End: 2024-04-09
Payer: COMMERCIAL

## 2024-04-09 VITALS
HEIGHT: 75 IN | OXYGEN SATURATION: 98 % | BODY MASS INDEX: 39.17 KG/M2 | WEIGHT: 315 LBS | SYSTOLIC BLOOD PRESSURE: 157 MMHG | DIASTOLIC BLOOD PRESSURE: 90 MMHG | TEMPERATURE: 98.3 F | RESPIRATION RATE: 14 BRPM | HEART RATE: 98 BPM

## 2024-04-09 DIAGNOSIS — Z11.4 SCREENING FOR HIV (HUMAN IMMUNODEFICIENCY VIRUS): ICD-10-CM

## 2024-04-09 DIAGNOSIS — Z11.59 NEED FOR HEPATITIS C SCREENING TEST: ICD-10-CM

## 2024-04-09 DIAGNOSIS — I10 BENIGN ESSENTIAL HYPERTENSION: ICD-10-CM

## 2024-04-09 DIAGNOSIS — F41.0 PANIC ATTACKS: Primary | ICD-10-CM

## 2024-04-09 PROCEDURE — 99214 OFFICE O/P EST MOD 30 MIN: CPT | Performed by: FAMILY MEDICINE

## 2024-04-09 RX ORDER — HYDROCHLOROTHIAZIDE 25 MG/1
25 TABLET ORAL DAILY
Qty: 90 TABLET | Refills: 1 | Status: SHIPPED | OUTPATIENT
Start: 2024-04-09

## 2024-04-09 ASSESSMENT — ASTHMA QUESTIONNAIRES
QUESTION_4 LAST FOUR WEEKS HOW OFTEN HAVE YOU USED YOUR RESCUE INHALER OR NEBULIZER MEDICATION (SUCH AS ALBUTEROL): NOT AT ALL
QUESTION_3 LAST FOUR WEEKS HOW OFTEN DID YOUR ASTHMA SYMPTOMS (WHEEZING, COUGHING, SHORTNESS OF BREATH, CHEST TIGHTNESS OR PAIN) WAKE YOU UP AT NIGHT OR EARLIER THAN USUAL IN THE MORNING: NOT AT ALL
ACT_TOTALSCORE: 24
QUESTION_1 LAST FOUR WEEKS HOW MUCH OF THE TIME DID YOUR ASTHMA KEEP YOU FROM GETTING AS MUCH DONE AT WORK, SCHOOL OR AT HOME: NONE OF THE TIME
QUESTION_5 LAST FOUR WEEKS HOW WOULD YOU RATE YOUR ASTHMA CONTROL: WELL CONTROLLED
QUESTION_2 LAST FOUR WEEKS HOW OFTEN HAVE YOU HAD SHORTNESS OF BREATH: NOT AT ALL
ACT_TOTALSCORE: 24

## 2024-04-09 NOTE — PROGRESS NOTES
"  Assessment & Plan     Screening for HIV (human immunodeficiency virus)  Low risk    Need for hepatitis C screening test  Low risk    Benign essential hypertension  Not controlled, increase hydrochlorothiazide to 25 mg daily. Recheck in 3 weeks.   - hydrochlorothiazide (HYDRODIURIL) 25 MG tablet; Take 1 tablet (25 mg) by mouth daily Take 1 tablet (12.5 mg) by mouth daily    Panic attacks  His symptoms are more consistent with panic attacks, first I do recommend MR of the brain, also start on sertraline and taper up the dose, recheck in 3 weeks.   Strongly advised to stop marijuana use karolina, pt agreed.   Close follow up,   - MR Brain w/o Contrast; Future  - sertraline (ZOLOFT) 50 MG tablet; Take 0.5 tablets (25 mg) by mouth daily for 7 days, THEN 1 tablet (50 mg) daily for 90 days.          MED REC REQUIRED  Post Medication Reconciliation Status:   BMI  Estimated body mass index is 40.52 kg/m  as calculated from the following:    Height as of this encounter: 1.905 m (6' 3\").    Weight as of this encounter: 147.1 kg (324 lb 3.2 oz).   Weight management plan: Discussed healthy diet and exercise guidelines          Noelle Nagy is a 33 year old, presenting for the following health issues:  Urgent Care (Pressure headaches, high bps visit 3/28/24)        4/9/2024     2:20 PM   Additional Questions   Roomed by AT     [facility]       ED/UC Followup:    Facility:  [facility]  Date of visit: 3/28/2024  Reason for visit: Lightheadedness, Benign essential hypertension, Gastroesophageal reflux disease with esophagitis without hemorrhage, Psoriasis  Current Status: PT WAS STARTED ON BP MEDS, STILL EXPERIENCING HEADACHES (PRESSURE) , STATES HE GETS FOGGY, HEAVY, AND HAS DIFFICULTY THINKING/PROCESSING THOUGHTS   FIRST TIME IT OCCURRED WHEN he is sitting watching TV, or playing video games, or sitting in the office, he start to feel distracted, then he feels lightheaded, dazed, confused, and " "tension and pressure in the head behind the eyes and ears, then he lays down, then it goes away after 20 minutes, during these episodes the anxiety is high, and has palpitations, sometimes shaky hands, sometimes his feet and hands are numb.  He is getting them multiple times during the day,       Review of Systems  Constitutional, HEENT, cardiovascular, pulmonary, gi and gu systems are negative, except as otherwise noted.      Objective    BP (!) 147/95 (BP Location: Right arm, Patient Position: Chair, Cuff Size: Adult Large)   Pulse 98   Temp 98.3  F (36.8  C) (Oral)   Resp 14   Ht 1.905 m (6' 3\")   Wt 147.1 kg (324 lb 3.2 oz)   SpO2 98%   BMI 40.52 kg/m    Body mass index is 40.52 kg/m .  Physical Exam   GENERAL: alert and no distress  EYES: Eyes grossly normal to inspection, PERRL and conjunctivae and sclerae normal  NECK: no adenopathy, no asymmetry, masses, or scars  RESP: lungs clear to auscultation - no rales, rhonchi or wheezes  CV: regular rate and rhythm, normal S1 S2, no S3 or S4, no murmur, click or rub, no peripheral edema  NEURO: Normal strength and tone, sensory exam grossly normal, mentation intact, cranial nerves 2-12 intact, DTR's normal and symmetric , gait normal including heel/toe/tandem walking, Romberg normal, and rapid alternating movements normal            Signed Electronically by: Dalia Stark MD    "

## 2024-04-19 ENCOUNTER — TELEPHONE (OUTPATIENT)
Dept: FAMILY MEDICINE | Facility: CLINIC | Age: 34
End: 2024-04-19
Payer: COMMERCIAL

## 2024-04-19 DIAGNOSIS — F41.0 PANIC ATTACKS: Primary | ICD-10-CM

## 2024-04-19 NOTE — TELEPHONE ENCOUNTER
Relayed message from Dr. Stark.    Yes, patient will try therapy if you can place referral.    Marylou Garcia RN on 4/19/2024 at 4:19 PM

## 2024-04-19 NOTE — TELEPHONE ENCOUNTER
Let's keep it at half a pill right now, then try to go up in 1 week and see.  Any interest in psychotherapy?

## 2024-04-19 NOTE — TELEPHONE ENCOUNTER
Patient calling with concerns with sertraline.  States he was given sertraline on 4/9 picked up the 10th.  Took the 1/2 pill for 8 nights.  Last night took first whole pill.  Has been noticing a increase in heart palpations and woke up in the middle of the night with a panic attack.  Also having insomnia.  Taking in the evening.  Discussed taking in am if to continue med.  States this is first time he has been on anxiety med and really not sold on it due to this has not been a pleasant experience but still trusts your opinion.  Please advise.  Nhi Dykes RN

## 2024-04-22 ENCOUNTER — NURSE TRIAGE (OUTPATIENT)
Dept: NURSING | Facility: CLINIC | Age: 34
End: 2024-04-22
Payer: COMMERCIAL

## 2024-04-22 RX ORDER — CITALOPRAM HYDROBROMIDE 10 MG/1
10 TABLET ORAL DAILY
Qty: 90 TABLET | Refills: 0 | Status: SHIPPED | OUTPATIENT
Start: 2024-04-22 | End: 2024-07-12

## 2024-04-22 NOTE — TELEPHONE ENCOUNTER
"LVM for patient to call the clinic at [phone] \"We have a message from your provider\"  Felipa Wong TC    "

## 2024-04-22 NOTE — TELEPHONE ENCOUNTER
Let's try another medicine, I will try citalopram 10 mg daily.  Meanwhile, please make sure he is off marijuana, and if sleep is still an issue, we can add gabapentin at night time.  Make sure he is scheduled with [facility]

## 2024-04-22 NOTE — TELEPHONE ENCOUNTER
Received a call from patient stating that he was returning a call from the clinic. In reviewing the chart, found the following message from PCP:        Relayed the above information to patient. He states he has questions and would like to discuss them with PCP. He asks:    - how is citalopram different than sertraline?  - he looked up the side effects and believes they have the same side-effects as sertraline. He reports having trouble sleeping and with sexual function, and states he does not feel depressed.     He states he has stopped smoking marijuana, and has an appt scheduled with [facility] on 4/25. He states he will not be picking up citalopram tonight, and that he already took sertraline today. States he will wait to speak to his PCP about his questions to decide if citalopram is the right course of action. Will route high priority to PCP and care team to follow up with patient tomorrow. He had no further questions.     Reason for Disposition   [1] Caller has NON-URGENT medicine question about med that PCP prescribed AND [2] triager unable to answer question    Protocols used: Medication Question Call-A-

## 2024-04-22 NOTE — TELEPHONE ENCOUNTER
Patient states he has only slept 10-12h of sleep in the past 3 nights. Has not tried melatonin, or benadryl.     Wondering about hydroxyzine for anxiety attacks.    He did go back to 25mg of Sertraline.     Having sexual side effects and heart palpitations.  Patient did stop smoking.    Should he continue the medication? Is there another option?    Transferred to  to schedule appointment with [facility].    Clemencia Ochoa RN

## 2024-04-23 NOTE — TELEPHONE ENCOUNTER
Citalopram is a little different, and different medicine has different side effects, so he may not get the side effects of Sertraline, but if he does, he can let us know.  Remember that sometimes, side effects can be related to things we say to ourselves, (placebo affect).  At any rate, if he doesn't feel comfortable starting on the new drug, we don't have to try any medicine, we can try psychotherapy only.

## 2024-04-26 ENCOUNTER — HOSPITAL ENCOUNTER (OUTPATIENT)
Dept: MRI IMAGING | Facility: CLINIC | Age: 34
Discharge: HOME | End: 2024-04-26
Attending: FAMILY MEDICINE | Admitting: FAMILY MEDICINE
Payer: COMMERCIAL

## 2024-04-26 DIAGNOSIS — F41.0 PANIC ATTACKS: ICD-10-CM

## 2024-04-26 PROCEDURE — 70551 MRI BRAIN STEM W/O DYE: CPT

## 2024-07-12 ENCOUNTER — MYC MEDICAL ADVICE (OUTPATIENT)
Dept: FAMILY MEDICINE | Facility: CLINIC | Age: 34
End: 2024-07-12

## 2024-07-12 ENCOUNTER — OFFICE VISIT (OUTPATIENT)
Dept: FAMILY MEDICINE | Facility: CLINIC | Age: 34
End: 2024-07-12
Payer: COMMERCIAL

## 2024-07-12 VITALS
HEART RATE: 84 BPM | SYSTOLIC BLOOD PRESSURE: 148 MMHG | DIASTOLIC BLOOD PRESSURE: 100 MMHG | RESPIRATION RATE: 16 BRPM | TEMPERATURE: 98.7 F | HEIGHT: 75 IN | OXYGEN SATURATION: 96 % | BODY MASS INDEX: 39.17 KG/M2 | WEIGHT: 315 LBS

## 2024-07-12 DIAGNOSIS — R73.03 PREDIABETES: ICD-10-CM

## 2024-07-12 DIAGNOSIS — F41.0 PANIC ATTACKS: ICD-10-CM

## 2024-07-12 DIAGNOSIS — Z11.59 NEED FOR HEPATITIS C SCREENING TEST: ICD-10-CM

## 2024-07-12 DIAGNOSIS — R82.998 FROTHY URINE: ICD-10-CM

## 2024-07-12 DIAGNOSIS — R42 DIZZINESS: ICD-10-CM

## 2024-07-12 DIAGNOSIS — R80.9 PROTEINURIA, UNSPECIFIED TYPE: ICD-10-CM

## 2024-07-12 DIAGNOSIS — I10 PRIMARY HYPERTENSION: ICD-10-CM

## 2024-07-12 DIAGNOSIS — Z11.4 SCREENING FOR HIV (HUMAN IMMUNODEFICIENCY VIRUS): Primary | ICD-10-CM

## 2024-07-12 DIAGNOSIS — F41.9 ANXIETY: ICD-10-CM

## 2024-07-12 DIAGNOSIS — R82.998 DARK URINE: Primary | ICD-10-CM

## 2024-07-12 LAB
ALBUMIN UR-MCNC: >=300 MG/DL
ANION GAP SERPL CALCULATED.3IONS-SCNC: 10 MMOL/L (ref 7–15)
APPEARANCE UR: CLEAR
BILIRUB UR QL STRIP: NEGATIVE
BUN SERPL-MCNC: 11.3 MG/DL (ref 6–20)
CALCIUM SERPL-MCNC: 9.3 MG/DL (ref 8.6–10)
CHLORIDE SERPL-SCNC: 102 MMOL/L (ref 98–107)
COLOR UR AUTO: YELLOW
CREAT SERPL-MCNC: 0.68 MG/DL (ref 0.67–1.17)
DEPRECATED HCO3 PLAS-SCNC: 27 MMOL/L (ref 22–29)
EGFRCR SERPLBLD CKD-EPI 2021: >90 ML/MIN/1.73M2
ERYTHROCYTE [DISTWIDTH] IN BLOOD BY AUTOMATED COUNT: 12.5 % (ref 10–15)
GLUCOSE SERPL-MCNC: 95 MG/DL (ref 70–99)
GLUCOSE UR STRIP-MCNC: NEGATIVE MG/DL
HBA1C MFR BLD: 5.8 % (ref 0–5.6)
HCT VFR BLD AUTO: 46.5 % (ref 40–53)
HCV AB SERPL QL IA: NONREACTIVE
HGB BLD-MCNC: 15 G/DL (ref 13.3–17.7)
HGB UR QL STRIP: NEGATIVE
HYALINE CASTS #/AREA URNS LPF: ABNORMAL /LPF
KETONES UR STRIP-MCNC: NEGATIVE MG/DL
LEUKOCYTE ESTERASE UR QL STRIP: NEGATIVE
MCH RBC QN AUTO: 27.3 PG (ref 26.5–33)
MCHC RBC AUTO-ENTMCNC: 32.3 G/DL (ref 31.5–36.5)
MCV RBC AUTO: 85 FL (ref 78–100)
MUCOUS THREADS #/AREA URNS LPF: PRESENT /LPF
NITRATE UR QL: NEGATIVE
PH UR STRIP: 6.5 [PH] (ref 5–7)
PLATELET # BLD AUTO: 320 10E3/UL (ref 150–450)
POTASSIUM SERPL-SCNC: 4.2 MMOL/L (ref 3.4–5.3)
RBC # BLD AUTO: 5.49 10E6/UL (ref 4.4–5.9)
RBC #/AREA URNS AUTO: ABNORMAL /HPF
SODIUM SERPL-SCNC: 139 MMOL/L (ref 135–145)
SP GR UR STRIP: >=1.03 (ref 1–1.03)
SQUAMOUS #/AREA URNS AUTO: ABNORMAL /LPF
TSH SERPL DL<=0.005 MIU/L-ACNC: 3.06 UIU/ML (ref 0.3–4.2)
UROBILINOGEN UR STRIP-ACNC: 0.2 E.U./DL
WBC # BLD AUTO: 8.8 10E3/UL (ref 4–11)
WBC #/AREA URNS AUTO: ABNORMAL /HPF

## 2024-07-12 PROCEDURE — 80053 COMPREHEN METABOLIC PANEL: CPT | Performed by: FAMILY MEDICINE

## 2024-07-12 PROCEDURE — 81001 URINALYSIS AUTO W/SCOPE: CPT | Performed by: FAMILY MEDICINE

## 2024-07-12 PROCEDURE — 99214 OFFICE O/P EST MOD 30 MIN: CPT | Performed by: FAMILY MEDICINE

## 2024-07-12 PROCEDURE — 86803 HEPATITIS C AB TEST: CPT | Performed by: FAMILY MEDICINE

## 2024-07-12 PROCEDURE — 83036 HEMOGLOBIN GLYCOSYLATED A1C: CPT | Performed by: FAMILY MEDICINE

## 2024-07-12 PROCEDURE — 87389 HIV-1 AG W/HIV-1&-2 AB AG IA: CPT | Performed by: FAMILY MEDICINE

## 2024-07-12 PROCEDURE — 84443 ASSAY THYROID STIM HORMONE: CPT | Performed by: FAMILY MEDICINE

## 2024-07-12 PROCEDURE — 85027 COMPLETE CBC AUTOMATED: CPT | Performed by: FAMILY MEDICINE

## 2024-07-12 PROCEDURE — 36415 COLL VENOUS BLD VENIPUNCTURE: CPT | Performed by: FAMILY MEDICINE

## 2024-07-12 PROCEDURE — 82248 BILIRUBIN DIRECT: CPT | Performed by: FAMILY MEDICINE

## 2024-07-12 RX ORDER — CITALOPRAM HYDROBROMIDE 10 MG/1
10 TABLET ORAL DAILY
Qty: 90 TABLET | Refills: 0 | Status: SHIPPED | OUTPATIENT
Start: 2024-07-12 | End: 2024-08-14 | Stop reason: DRUGHIGH

## 2024-07-12 ASSESSMENT — ANXIETY QUESTIONNAIRES
7. FEELING AFRAID AS IF SOMETHING AWFUL MIGHT HAPPEN: SEVERAL DAYS
7. FEELING AFRAID AS IF SOMETHING AWFUL MIGHT HAPPEN: SEVERAL DAYS
6. BECOMING EASILY ANNOYED OR IRRITABLE: NEARLY EVERY DAY
GAD7 TOTAL SCORE: 6
4. TROUBLE RELAXING: NOT AT ALL
IF YOU CHECKED OFF ANY PROBLEMS ON THIS QUESTIONNAIRE, HOW DIFFICULT HAVE THESE PROBLEMS MADE IT FOR YOU TO DO YOUR WORK, TAKE CARE OF THINGS AT HOME, OR GET ALONG WITH OTHER PEOPLE: SOMEWHAT DIFFICULT
1. FEELING NERVOUS, ANXIOUS, OR ON EDGE: SEVERAL DAYS
8. IF YOU CHECKED OFF ANY PROBLEMS, HOW DIFFICULT HAVE THESE MADE IT FOR YOU TO DO YOUR WORK, TAKE CARE OF THINGS AT HOME, OR GET ALONG WITH OTHER PEOPLE?: SOMEWHAT DIFFICULT
5. BEING SO RESTLESS THAT IT IS HARD TO SIT STILL: NOT AT ALL
3. WORRYING TOO MUCH ABOUT DIFFERENT THINGS: SEVERAL DAYS
2. NOT BEING ABLE TO STOP OR CONTROL WORRYING: NOT AT ALL
GAD7 TOTAL SCORE: 6

## 2024-07-12 NOTE — PATIENT INSTRUCTIONS
I will review all your labs over the next 2-3 days. Restart taking your citalopram and hydrochlorothiazide today.  It would be great if you could follow-up for a full preventive health visit, at which time blood pressure can be rechecked and we can further discuss mental health management.

## 2024-07-12 NOTE — PROGRESS NOTES
Assessment & Plan   See after visit summary and result note for helpful information and advice given to patient.    Primary hypertension    - Basic metabolic panel    Dizziness    - CBC with platelets  - TSH with free T4 reflex    Panic attacks    - citalopram (CELEXA) 10 MG tablet  Dispense: 90 tablet; Refill: 0    Frothy urine    - UA Macroscopic with reflex to Microscopic and Culture  - UA Microscopic with Reflex to Culture    Prediabetes    - Hemoglobin A1c    Anxiety  Will restart citalopram treatment.    Screening for HIV (human immunodeficiency virus)    - HIV Antigen Antibody Combo  - HIV Antigen Antibody Combo    Need for hepatitis C screening test    - Hepatitis C Screen Reflex to HCV RNA Quant and Genotype  - Hepatitis C Screen Reflex to HCV RNA Quant and Genotype                  Subjective   Yakov is a 34 year old, presenting for the following health issues:  Dizziness        7/12/2024     1:14 PM   Additional Questions   Roomed by NAHID Gallegos   Accompanied by Self     History of Present Illness       Mental Health Follow-up:  Patient presents to follow-up on Anxiety.    Patient's anxiety since last visit has been:  No change  The patient is not having other symptoms associated with anxiety.  Any significant life events: job concerns  Patient is feeling anxious or having panic attacks.  Patient has no concerns about alcohol or drug use.    Hypertension: He presents for follow up of hypertension.  He does not check blood pressure  regularly outside of the clinic. Outside blood pressures have been over 140/90. He does not follow a low salt diet.     Reason for visit:  Lightheadedness, dizziness, bubbles in urine    He eats 0-1 servings of fruits and vegetables daily.He consumes 0 sweetened beverage(s) daily.He exercises with enough effort to increase his heart rate 30 to 60 minutes per day.  He exercises with enough effort to increase his heart rate 5 days per week. He is missing 5 dose(s) of  "medications per week.                 Review of Systems  Patient is seen for multiple concerns this visit.    Patient has felt dizzy.  This has persisted despite stopping smoking of marajuana, with dizziness persisting.     Dizziness is especially pronounced when using computer. He will develop a pressure sensation in forehead.     His dizzy episodes will last about 5 minutes.     Patient stopped taking his citalopram and hydrochlorothiazide about a month ago, forgetting to take medication, thinking about this the past couple days. He wanted to have exam before restarting.     He is unsure if he took the citalopram long enough to know if it helped his anxiety, but he does not recall any negative side effects.     When he went to urinate last night and this AM, he noted more \"bubbles\" in urine. No associated abnormal urinary symptoms.     Patent reports having a normal eye exam the past month.  This was discussed to assess possible ophthalmologic cause of dizziness such as when using a computer.    TSH was slightly elevated about 4 years ago, so will recheck.     No chest pain, shortness of breath, or heart palpitations related to dizziness.     He can not find his citalopram, so we will refill this at this visit.     Patient was agreeable to screening for HIV and hepatitis C as recommended preventive health screening.        Objective    BP (!) 148/100 (BP Location: Right arm, Patient Position: Sitting, Cuff Size: Adult Large)   Pulse 84   Temp 98.7  F (37.1  C) (Oral)   Resp 16   Ht 1.905 m (6' 3\")   Wt 143.3 kg (316 lb)   SpO2 96%   BMI 39.50 kg/m    Body mass index is 39.5 kg/m .  Physical Exam   Vital signs reviewed.  Patient is in no acute appearing distress.  Breathing appears nonlabored.  Patient is alert and oriented ×3.  Patient is pleasant, making good eye contact and responding with clear fluent speech.    ENT: Ear exam shows bilateral tympanic membranes to be clear without injection, nasal " turbinates show no injection or edema, no pharyngeal injection or exudate.    Eyes: No scleral, lid, or periorbital injection or edema noted.  No eye mattering noted.  Corneas are clear. Pupils are equal round and reactive to light with normal consensual eye movement.    Neck: supple with no adenoapthy, palpable abnormal masses, or thyroid abnormality.    Heart: Heart rate is regular without murmur.    Lungs: Lungs are clear to auscultation with good airflow bilaterally.    Skin/extremities: Warm and dry, with no lower leg edema.    Exam Psych: See earlier documentation.  In addition, patient answers questions appropriately.  No pressured speech.  No psychomotor agitation.            Signed Electronically by: Dorian Martínez DO    Answers submitted by the patient for this visit:  LEIGH-7 (Submitted on 7/12/2024)  LEIGH 7 TOTAL SCORE: 6  Hypertension Visit (Submitted on 7/12/2024)  Chief Complaint: Chronic problems general questions HPI Form  Do you check your blood pressure regularly outside of the clinic?: No  Are your blood pressures ever more than 140 on the top number (systolic) OR more than 90 on the bottom number (diastolic)? (For example, greater than 140/90): Yes  Are you following a low salt diet?: No  Depression / Anxiety Questionnaire (Submitted on 7/12/2024)  Chief Complaint: Chronic problems general questions HPI Form  Depression/Anxiety: Anxiety  Anxiety only (Submitted on 7/12/2024)  Chief Complaint: Chronic problems general questions HPI Form  Anxiety since last: : no change  Other associated symotome: : No  Significant life event: : job concerns  Anxious:: Yes  Current substance use:: No  General Questionnaire (Submitted on 7/12/2024)  Chief Complaint: Chronic problems general questions HPI Form  What is the reason for your visit today? : lightheadedness, dizziness, bubbles in urine  How many servings of fruits and vegetables do you eat daily?: 0-1  On average, how many sweetened beverages do you drink  each day (Examples: soda, juice, sweet tea, etc.  Do NOT count diet or artificially sweetened beverages)?: 0  How many minutes a day do you exercise enough to make your heart beat faster?: 30 to 60  How many days a week do you exercise enough to make your heart beat faster?: 5  How many days per week do you miss taking your medication?: 5

## 2024-07-13 LAB — HIV 1+2 AB+HIV1 P24 AG SERPL QL IA: NONREACTIVE

## 2024-07-15 LAB
ALBUMIN SERPL BCG-MCNC: 4.5 G/DL (ref 3.5–5.2)
ALP SERPL-CCNC: 54 U/L (ref 40–150)
ALT SERPL W P-5'-P-CCNC: 28 U/L (ref 0–70)
AST SERPL W P-5'-P-CCNC: 25 U/L (ref 0–45)
BILIRUB DIRECT SERPL-MCNC: <0.2 MG/DL (ref 0–0.3)
BILIRUB SERPL-MCNC: 0.5 MG/DL
PROT SERPL-MCNC: 7.7 G/DL (ref 6.4–8.3)

## 2024-07-17 ENCOUNTER — NURSE TRIAGE (OUTPATIENT)
Dept: FAMILY MEDICINE | Facility: CLINIC | Age: 34
End: 2024-07-17

## 2024-07-17 ENCOUNTER — LAB (OUTPATIENT)
Dept: LAB | Facility: CLINIC | Age: 34
End: 2024-07-17
Payer: COMMERCIAL

## 2024-07-17 ENCOUNTER — TELEPHONE (OUTPATIENT)
Dept: FAMILY MEDICINE | Facility: CLINIC | Age: 34
End: 2024-07-17

## 2024-07-17 ENCOUNTER — HOSPITAL ENCOUNTER (EMERGENCY)
Facility: CLINIC | Age: 34
Discharge: HOME | End: 2024-07-18
Attending: EMERGENCY MEDICINE | Admitting: EMERGENCY MEDICINE
Payer: COMMERCIAL

## 2024-07-17 VITALS
OXYGEN SATURATION: 99 % | WEIGHT: 312.61 LBS | DIASTOLIC BLOOD PRESSURE: 94 MMHG | BODY MASS INDEX: 39.07 KG/M2 | TEMPERATURE: 97.4 F | SYSTOLIC BLOOD PRESSURE: 142 MMHG | HEART RATE: 82 BPM | RESPIRATION RATE: 18 BRPM

## 2024-07-17 DIAGNOSIS — R82.998 DARK URINE: ICD-10-CM

## 2024-07-17 DIAGNOSIS — R82.998 FROTHY URINE: ICD-10-CM

## 2024-07-17 DIAGNOSIS — R73.03 PREDIABETES: ICD-10-CM

## 2024-07-17 DIAGNOSIS — R80.9 PROTEINURIA, UNSPECIFIED TYPE: ICD-10-CM

## 2024-07-17 DIAGNOSIS — R42 LIGHTHEADEDNESS: ICD-10-CM

## 2024-07-17 LAB
ALBUMIN SERPL BCG-MCNC: 4.5 G/DL (ref 3.5–5.2)
ALBUMIN UR-MCNC: 100 MG/DL
ALBUMIN UR-MCNC: >=300 MG/DL
ALP SERPL-CCNC: 56 U/L (ref 40–150)
ALT SERPL W P-5'-P-CCNC: 26 U/L (ref 0–70)
ANION GAP SERPL CALCULATED.3IONS-SCNC: 14 MMOL/L (ref 7–15)
ANION GAP SERPL CALCULATED.3IONS-SCNC: 9 MMOL/L (ref 7–15)
APPEARANCE UR: CLEAR
APPEARANCE UR: CLEAR
AST SERPL W P-5'-P-CCNC: 24 U/L (ref 0–45)
BACTERIA #/AREA URNS HPF: ABNORMAL /HPF
BACTERIA #/AREA URNS HPF: ABNORMAL /HPF
BASOPHILS # BLD AUTO: 0.1 10E3/UL (ref 0–0.2)
BASOPHILS NFR BLD AUTO: 1 %
BILIRUB SERPL-MCNC: 0.5 MG/DL
BILIRUB UR QL STRIP: NEGATIVE
BILIRUB UR QL STRIP: NEGATIVE
BUN SERPL-MCNC: 13.8 MG/DL (ref 6–20)
BUN SERPL-MCNC: 14.9 MG/DL (ref 6–20)
CALCIUM SERPL-MCNC: 9.3 MG/DL (ref 8.8–10.4)
CALCIUM SERPL-MCNC: 9.7 MG/DL (ref 8.8–10.4)
CHLORIDE SERPL-SCNC: 100 MMOL/L (ref 98–107)
CHLORIDE SERPL-SCNC: 99 MMOL/L (ref 98–107)
COLOR UR AUTO: YELLOW
COLOR UR AUTO: YELLOW
CREAT SERPL-MCNC: 0.76 MG/DL (ref 0.67–1.17)
CREAT SERPL-MCNC: 0.86 MG/DL (ref 0.67–1.17)
CREAT UR-MCNC: 268 MG/DL
EGFRCR SERPLBLD CKD-EPI 2021: >90 ML/MIN/1.73M2
EGFRCR SERPLBLD CKD-EPI 2021: >90 ML/MIN/1.73M2
EOSINOPHIL # BLD AUTO: 0.3 10E3/UL (ref 0–0.7)
EOSINOPHIL NFR BLD AUTO: 3 %
ERYTHROCYTE [DISTWIDTH] IN BLOOD BY AUTOMATED COUNT: 12.5 % (ref 10–15)
GLUCOSE SERPL-MCNC: 108 MG/DL (ref 70–99)
GLUCOSE SERPL-MCNC: 98 MG/DL (ref 70–99)
GLUCOSE UR STRIP-MCNC: NEGATIVE MG/DL
GLUCOSE UR STRIP-MCNC: NEGATIVE MG/DL
HCO3 SERPL-SCNC: 26 MMOL/L (ref 22–29)
HCO3 SERPL-SCNC: 30 MMOL/L (ref 22–29)
HCT VFR BLD AUTO: 47.3 % (ref 40–53)
HGB BLD-MCNC: 15.5 G/DL (ref 13.3–17.7)
HGB UR QL STRIP: ABNORMAL
HGB UR QL STRIP: ABNORMAL
HYALINE CASTS #/AREA URNS LPF: ABNORMAL /LPF
IMM GRANULOCYTES # BLD: 0.1 10E3/UL
IMM GRANULOCYTES NFR BLD: 1 %
KETONES UR STRIP-MCNC: NEGATIVE MG/DL
KETONES UR STRIP-MCNC: NEGATIVE MG/DL
LEUKOCYTE ESTERASE UR QL STRIP: NEGATIVE
LEUKOCYTE ESTERASE UR QL STRIP: NEGATIVE
LYMPHOCYTES # BLD AUTO: 3 10E3/UL (ref 0.8–5.3)
LYMPHOCYTES NFR BLD AUTO: 27 %
MCH RBC QN AUTO: 27.3 PG (ref 26.5–33)
MCHC RBC AUTO-ENTMCNC: 32.8 G/DL (ref 31.5–36.5)
MCV RBC AUTO: 83 FL (ref 78–100)
MICROALBUMIN UR-MCNC: 824 MG/L
MICROALBUMIN/CREAT UR: 307.46 MG/G CR (ref 0–17)
MONOCYTES # BLD AUTO: 1.2 10E3/UL (ref 0–1.3)
MONOCYTES NFR BLD AUTO: 11 %
MUCOUS THREADS #/AREA URNS LPF: PRESENT /LPF
NEUTROPHILS # BLD AUTO: 6.5 10E3/UL (ref 1.6–8.3)
NEUTROPHILS NFR BLD AUTO: 58 %
NITRATE UR QL: NEGATIVE
NITRATE UR QL: NEGATIVE
NRBC # BLD AUTO: 0 10E3/UL
NRBC BLD AUTO-RTO: 0 /100
PH UR STRIP: 5.5 [PH] (ref 5–7)
PH UR STRIP: 5.5 [PH] (ref 5–7)
PLATELET # BLD AUTO: 340 10E3/UL (ref 150–450)
POTASSIUM SERPL-SCNC: 3.9 MMOL/L (ref 3.4–5.3)
POTASSIUM SERPL-SCNC: 4.4 MMOL/L (ref 3.4–5.3)
PROT SERPL-MCNC: 7.8 G/DL (ref 6.4–8.3)
RBC # BLD AUTO: 5.68 10E6/UL (ref 4.4–5.9)
RBC #/AREA URNS AUTO: ABNORMAL /HPF
RBC URINE: 1 /HPF
SODIUM SERPL-SCNC: 139 MMOL/L (ref 135–145)
SODIUM SERPL-SCNC: 139 MMOL/L (ref 135–145)
SP GR UR STRIP: 1.03 (ref 1–1.03)
SP GR UR STRIP: >=1.03 (ref 1–1.03)
SQUAMOUS #/AREA URNS AUTO: ABNORMAL /LPF
SQUAMOUS EPITHELIAL: <1 /HPF
TROPONIN T SERPL HS-MCNC: 23 NG/L
TROPONIN T SERPL HS-MCNC: 6 NG/L
TSH SERPL DL<=0.005 MIU/L-ACNC: 2.68 UIU/ML (ref 0.3–4.2)
UROBILINOGEN UR STRIP-ACNC: 0.2 E.U./DL
UROBILINOGEN UR STRIP-MCNC: NORMAL MG/DL
WBC # BLD AUTO: 11.1 10E3/UL (ref 4–11)
WBC #/AREA URNS AUTO: ABNORMAL /HPF
WBC URINE: 3 /HPF

## 2024-07-17 PROCEDURE — 84075 ASSAY ALKALINE PHOSPHATASE: CPT | Performed by: EMERGENCY MEDICINE

## 2024-07-17 PROCEDURE — 250N000011 HC RX IP 250 OP 636: Performed by: EMERGENCY MEDICINE

## 2024-07-17 PROCEDURE — 82043 UR ALBUMIN QUANTITATIVE: CPT

## 2024-07-17 PROCEDURE — 82570 ASSAY OF URINE CREATININE: CPT

## 2024-07-17 PROCEDURE — 36415 COLL VENOUS BLD VENIPUNCTURE: CPT | Performed by: EMERGENCY MEDICINE

## 2024-07-17 PROCEDURE — 82374 ASSAY BLOOD CARBON DIOXIDE: CPT | Performed by: EMERGENCY MEDICINE

## 2024-07-17 PROCEDURE — 81001 URINALYSIS AUTO W/SCOPE: CPT | Performed by: EMERGENCY MEDICINE

## 2024-07-17 PROCEDURE — 93005 ELECTROCARDIOGRAM TRACING: CPT

## 2024-07-17 PROCEDURE — 80048 BASIC METABOLIC PNL TOTAL CA: CPT

## 2024-07-17 PROCEDURE — 36415 COLL VENOUS BLD VENIPUNCTURE: CPT

## 2024-07-17 PROCEDURE — 81001 URINALYSIS AUTO W/SCOPE: CPT

## 2024-07-17 PROCEDURE — 84484 ASSAY OF TROPONIN QUANT: CPT | Performed by: EMERGENCY MEDICINE

## 2024-07-17 PROCEDURE — 85025 COMPLETE CBC W/AUTO DIFF WBC: CPT | Performed by: EMERGENCY MEDICINE

## 2024-07-17 PROCEDURE — 84443 ASSAY THYROID STIM HORMONE: CPT | Performed by: EMERGENCY MEDICINE

## 2024-07-17 PROCEDURE — 99284 EMERGENCY DEPT VISIT MOD MDM: CPT

## 2024-07-17 RX ORDER — ONDANSETRON 4 MG/1
4 TABLET, ORALLY DISINTEGRATING ORAL EVERY 6 HOURS PRN
Status: DISCONTINUED | OUTPATIENT
Start: 2024-07-17 | End: 2024-07-18 | Stop reason: HOSPADM

## 2024-07-17 RX ADMIN — ONDANSETRON 4 MG: 4 TABLET, ORALLY DISINTEGRATING ORAL at 23:09

## 2024-07-17 ASSESSMENT — COLUMBIA-SUICIDE SEVERITY RATING SCALE - C-SSRS
6. HAVE YOU EVER DONE ANYTHING, STARTED TO DO ANYTHING, OR PREPARED TO DO ANYTHING TO END YOUR LIFE?: NO
1. IN THE PAST MONTH, HAVE YOU WISHED YOU WERE DEAD OR WISHED YOU COULD GO TO SLEEP AND NOT WAKE UP?: NO
2. HAVE YOU ACTUALLY HAD ANY THOUGHTS OF KILLING YOURSELF IN THE PAST MONTH?: NO

## 2024-07-17 ASSESSMENT — ACTIVITIES OF DAILY LIVING (ADL)
ADLS_ACUITY_SCORE: 33

## 2024-07-17 NOTE — LETTER
July 17, 2024      To Whom It May Concern:      Yakov Cantrell was seen in our Emergency Department today, 07/17/24. He may return to work/school when improved.    Sincerely,        Carolyn Rivers RN

## 2024-07-17 NOTE — ED TRIAGE NOTES
"Pt states that he had a sudden onset of dizziness while sitting at his desk today.  This has since gotten better.  He also wants to know what his results are from his appointment today as he is worried he is in renal failure.  Pt has multiple complaints including \"unintentional wt loss\" after starting hydrochlorothiazide he was 315lbs a couple weeks ago and is 312 today.  Pt states that he only ate a few things today.       Triage Assessment (Adult)       Row Name 07/17/24 7264          Triage Assessment    Airway WDL WDL        Respiratory WDL    Respiratory WDL WDL        Skin Circulation/Temperature WDL    Skin Circulation/Temperature WDL WDL        Peripheral/Neurovascular WDL    Peripheral Neurovascular WDL WDL        Cognitive/Neuro/Behavioral WDL    Cognitive/Neuro/Behavioral WDL WDL                     "

## 2024-07-17 NOTE — TELEPHONE ENCOUNTER
"Patient calling.  He is asking if provider is seeing any signs of kidney failure in regards to his lab results from 7/12/24.    Patient states \"he is freaking out about the results\".    He also states he unintentionally lost 4 lbs since 7/12/24.    He would like to speak with Dr. Martínez directly if possible.    Please advise, thanks.  "

## 2024-07-17 NOTE — TELEPHONE ENCOUNTER
Patient calls to report a sudden onset of dizziness. This started at work at about 5:40pm. Pt was just sitting in his chair before symptoms started. Feels lightheaded and dizzy, feels like he will pass out. Does not feel like the room is spinning. Has had this feeling before, was never given a dx. Still having ongoing workup for this per pt. Has not eaten much today. States his BP is 155/90 and HR is 96. Denies chest pain or SOB. No numbness or tingling anywhere. Pt states he feels he can walk but dizziness is severe. Has not fallen. Advised patient to present to ED/UCC. States has been seen at  for this and they did not find anything. Will go to [facility] ER now. His wife will come pick him up and drive him.    Marylou Garcia RN on 7/17/2024 at 5:54 PM    Reason for Disposition   SEVERE dizziness (e.g., unable to stand, requires support to walk, feels like passing out now)    Additional Information   Negative: SEVERE difficulty breathing (e.g., struggling for each breath, speaks in single words)   Negative: Shock suspected (e.g., cold/pale/clammy skin, too weak to stand, low BP, rapid pulse)   Negative: Difficult to awaken or acting confused (e.g., disoriented, slurred speech)   Negative: Fainted, and still feels dizzy afterwards   Negative: Overdose (accidental or intentional) of medications   Negative: New neurologic deficit that is present now: * Weakness of the face, arm, or leg on one side of the body * Numbness of the face, arm, or leg on one side of the body * Loss of speech or garbled speech   Negative: Heart beating < 50 beats per minute OR > 140 beats per minute   Negative: Sounds like a life-threatening emergency to the triager   Negative: Chest pain   Negative: Rectal bleeding, bloody stool, or tarry-black stool   Negative: Vomiting is main symptom   Negative: Diarrhea is main symptom   Negative: Headache is main symptom   Negative: Heat exhaustion suspected (i.e., dehydration from heat exposure)    Negative: Patient states that they are having an anxiety or panic attack   Negative: Dizziness from low blood sugar (i.e., < 60 mg/dl or 3.5 mmol/l)    Answer Assessment - Initial Assessment Questions  1. DESCRIPTION: Feels like he is going to faint  2. LIGHTHEADED: yes  3. VERTIGO: Denies  4. SEVERITY: Unable to walk without falling, or requires assistance to walk without falling; feels like passing out now.   5. ONSET: 3 minutes ago   6. AGGRAVATING FACTORS: Changing positions quickly   7. HEART RATE: BP: 155/90 HR 96 by home cuff  8. CAUSE: Unsure  9. RECURRENT SYMPTOM: no dx given yet  10. OTHER SYMPTOMS:   Denies: fever, chest pain, vomiting, diarrhea, bleeding, difficulty breathing    Protocols used: Dizziness-A-OH

## 2024-07-18 LAB
ATRIAL RATE - MUSE: 80 BPM
DIASTOLIC BLOOD PRESSURE - MUSE: NORMAL MMHG
INTERPRETATION ECG - MUSE: NORMAL
P AXIS - MUSE: 42 DEGREES
PR INTERVAL - MUSE: 142 MS
QRS DURATION - MUSE: 94 MS
QT - MUSE: 388 MS
QTC - MUSE: 447 MS
R AXIS - MUSE: 11 DEGREES
SYSTOLIC BLOOD PRESSURE - MUSE: NORMAL MMHG
T AXIS - MUSE: 37 DEGREES
VENTRICULAR RATE- MUSE: 80 BPM

## 2024-07-18 NOTE — ED PROVIDER NOTES
Emergency Department Note      History of Present Illness     Chief Complaint   Dizziness      HPI   Yakov Cantrell is a 34 year old male who presents for evaluation of dizziness. The patient reports sudden onset of an episode of lightheadedness today while sitting in front of his computer at work. During the episode, he describes that he felt off balance, pressure in his head, and on the verge of passing out. He immediately had to lay down. Later when he got home, he called the nurse triage line. The nurse instructed the patient to be seen here. Denies nausea, vomiting, headache, chest pain, and shortness of breath. The patient adds that a week ago he stopped taking his blood pressure medicine which increased his blood pressure. He was seen at urgent care 5 days ago and received a UA. He was then restarted on his 25 mg of hydrochlorothiazide 5 days ago. Since restarting his medication, the patient reports losing a pound a day despite eating and drinking normally. He also endorses mild dysuria. Denies leg swelling.    Independent Historian   None    Review of External Notes   7/12/24: Clinic note reviewed    Past Medical History     Medical History and Problem List   Past Medical History:   Diagnosis Date    CARDIOVASCULAR SCREENING; LDL GOAL LESS THAN 160 6/19/2014    Cirrhosis (H)     Mild persistent asthma, unspecified whether complicated 6/19/2018       Medications   albuterol (PROAIR HFA/PROVENTIL HFA/VENTOLIN HFA) 108 (90 Base) MCG/ACT inhaler  albuterol (PROVENTIL) (2.5 MG/3ML) 0.083% neb solution  citalopram (CELEXA) 10 MG tablet  clobetasol propionate (TEMOVATE) 0.05 % external cream  hydrochlorothiazide (HYDRODIURIL) 25 MG tablet  omeprazole (PRILOSEC) 40 MG DR capsule        Surgical History   No past surgical history on file.    Physical Exam     Patient Vitals for the past 24 hrs:   BP Temp Temp src Pulse Resp SpO2 Weight   07/17/24 2329 (!) 142/94 -- -- 82 18 99 % --   07/17/24 1828 (!) 162/100 97.4   F (36.3  C) Temporal 91 18 100 % 141.8 kg (312 lb 9.8 oz)     Physical Exam  General: No acute distress  Head: No obvious trauma to head.  Ears, Nose, Throat:  External ears normal.  Nose normal.  No pharyngeal erythema, swelling or exudate.  Midline uvula. Moist mucus membranes.  Eyes:  Conjunctivae clear.   Neck: Normal range of motion.  Neck supple.   CV: Regular rate and rhythm.  No murmurs.      Respiratory: Effort normal and breath sounds normal.  No wheezing or crackles.   Gastrointestinal: Soft.  No distension. There is no tenderness.  There is no rigidity, no rebound and no guarding.   Musculoskeletal: Normal range of motion.  Non tender extremities to palpations. No lower extremity edema  Neuro: Alert. Moving all extremities appropriately.  Normal speech.    Skin: Skin is warm and dry.  No rash noted.   Psych: Normal mood and affect. Behavior is normal.       Diagnostics     Lab Results   Labs Ordered and Resulted from Time of ED Arrival to Time of ED Departure   ROUTINE UA WITH MICROSCOPIC REFLEX TO CULTURE - Abnormal       Result Value    Color Urine Yellow      Appearance Urine Clear      Glucose Urine Negative      Bilirubin Urine Negative      Ketones Urine Negative      Specific Gravity Urine 1.030      Blood Urine Trace (*)     pH Urine 5.5      Protein Albumin Urine 100 (*)     Urobilinogen Urine Normal      Nitrite Urine Negative      Leukocyte Esterase Urine Negative      Bacteria Urine Few (*)     Mucus Urine Present (*)     RBC Urine 1      WBC Urine 3      Squamous Epithelials Urine <1     TROPONIN T, HIGH SENSITIVITY - Abnormal    Troponin T, High Sensitivity 23 (*)    CBC WITH PLATELETS AND DIFFERENTIAL - Abnormal    WBC Count 11.1 (*)     RBC Count 5.68      Hemoglobin 15.5      Hematocrit 47.3      MCV 83      MCH 27.3      MCHC 32.8      RDW 12.5      Platelet Count 340      % Neutrophils 58      % Lymphocytes 27      % Monocytes 11      % Eosinophils 3      % Basophils 1      % Immature  Granulocytes 1      NRBCs per 100 WBC 0      Absolute Neutrophils 6.5      Absolute Lymphocytes 3.0      Absolute Monocytes 1.2      Absolute Eosinophils 0.3      Absolute Basophils 0.1      Absolute Immature Granulocytes 0.1      Absolute NRBCs 0.0     COMPREHENSIVE METABOLIC PANEL - Normal    Sodium 139      Potassium 3.9      Carbon Dioxide (CO2) 26      Anion Gap 14      Urea Nitrogen 13.8      Creatinine 0.76      GFR Estimate >90      Calcium 9.3      Chloride 99      Glucose 98      Alkaline Phosphatase 56      AST 24      ALT 26      Protein Total 7.8      Albumin 4.5      Bilirubin Total 0.5     TSH WITH FREE T4 REFLEX - Normal    TSH 2.68     TROPONIN T, HIGH SENSITIVITY - Normal    Troponin T, High Sensitivity 6         Imaging   No orders to display       EKG     ECG results from 07/17/24   EKG 12-lead, tracing only     Value    Systolic Blood Pressure     Diastolic Blood Pressure     Ventricular Rate 80    Atrial Rate 80    CO Interval 142    QRS Duration 94        QTc 447    P Axis 42    R AXIS 11    T Axis 37    Interpretation ECG      Sinus rhythm  Normal ECG  When compared with ECG of 23-OCT-2023 06:00,  No significant change was found           Independent Interpretation   None    ED Course      Medications Administered   Medications - No data to display    Procedures   Procedures     Discussion of Management   None    ED Course   ED Course as of 07/18/24 0427   Wed Jul 17, 2024 2000 I obtained history and performed physical exam as noted above.        Optional/Additional Documentation  None    Medical Decision Making / Diagnosis     CMS Diagnoses: None    MIPS       None    [facility]   Yakov Cantrell is a 34 year old male presenting with an episode of dizziness earlier today.  He reports he has had symptoms like this in the past.  He has no focal neurodeficits on exam, no signs of trauma.  He is hemodynamically stable and breathing comfortably on room air.  EKG shows no ischemic changes.   Troponin is 23, but on 2-hour recheck troponin improved to 6.  TSH is within normal limits.  Electrolytes are within normal limits.  UA is negative for infection, but does have protein and trace blood.  This was recently observed on a previous UA.  He has a kidney ultrasound scheduled for 2 days from now.  Creatinine is normal.  He is relieved that he has normal kidney function.  He is encouraged to keep the ultrasound appointment, and to follow-up with his primary care provider.  Return precautions are given and he verbalizes understanding.  He is discharged home in stable condition.    Disposition   The patient was discharged.     Diagnosis     ICD-10-CM    1. Lightheadedness  R42            Discharge Medications   Discharge Medication List as of 7/17/2024 11:57 PM            Scribe Disclosure:  I, Sami Rodrigues, am serving as a scribe at 9:18 PM on 7/17/2024 to document services personally performed by Jose Francisco Kelsey MD based on my observations and the provider's statements to me.        Jose Francisco Kelsey MD  07/18/24 0434

## 2024-07-18 NOTE — DISCHARGE INSTRUCTIONS
Your first cardiac enzyme level was slightly elevated, but your second 1 was completely normal.  This is very reassuring.  We recommend that you continue taking your medication as prescribed.  Your kidney function was also normal.  Please keep your appointment to have the kidney ultrasound on Friday.  We recommend that you follow-up with your primary care provider.  Please return the emergency department if you develop any new or concerning symptoms.

## 2024-07-19 ENCOUNTER — PATIENT OUTREACH (OUTPATIENT)
Dept: FAMILY MEDICINE | Facility: CLINIC | Age: 34
End: 2024-07-19
Payer: COMMERCIAL

## 2024-07-19 ENCOUNTER — ANCILLARY PROCEDURE (OUTPATIENT)
Dept: ULTRASOUND IMAGING | Facility: CLINIC | Age: 34
End: 2024-07-19
Attending: FAMILY MEDICINE
Payer: COMMERCIAL

## 2024-07-19 DIAGNOSIS — R73.03 PREDIABETES: ICD-10-CM

## 2024-07-19 DIAGNOSIS — R82.998 DARK URINE: ICD-10-CM

## 2024-07-19 DIAGNOSIS — R82.998 FROTHY URINE: ICD-10-CM

## 2024-07-19 DIAGNOSIS — R80.9 PROTEINURIA, UNSPECIFIED TYPE: ICD-10-CM

## 2024-07-19 PROCEDURE — 76770 US EXAM ABDO BACK WALL COMP: CPT

## 2024-07-19 NOTE — TELEPHONE ENCOUNTER
"  Transitions of Care Outreach  Chief Complaint   Patient presents with    Hospital F/U     ER 7/18/24 Lightheadedness        Most Recent Admission Date: 7/17/2024   Most Recent Admission Diagnosis:      Most Recent Discharge Date: 7/18/2024   Most Recent Discharge Diagnosis: Lightheadedness - R42     Transitions of Care Assessment    Discharge Assessment  How are you doing now that you are home?: \"Better\"  How are your symptoms? (Red Flag symptoms escalate to triage hotline per guidelines): Improved  Does the patient have their discharge instructions? : Yes  Does the patient have questions regarding their discharge instructions? : No    Follow up Plan      Will call back to schedule follow up with Dalia Stark MD     Future Appointments   Date Time Provider Department Center   8/14/2024  1:00 PM Dorian Martínez,  [facility]       Outpatient Plan as outlined on AVS reviewed with patient.    For any urgent concerns, please contact our 24 hour nurse triage line: [phone] ([phone])       Clemencia Ochoa RN     "

## 2024-07-19 NOTE — TELEPHONE ENCOUNTER
Call patient for hospital follow up.  Most Recent Admission Date: 7/17/2024   Most Recent Admission Diagnosis:      Most Recent Discharge Date: 7/18/2024   Most Recent Discharge Diagnosis: Lightheadedness - R42     Med changes: none    After Visit Summary follow up recommendations: Schedule an appointment with Dalia Stark MD as  soon as possible for a visit    Appointment needed within 7    Hospital follow up appointment has been made.      Aug 14, 2024 1:00 PM  (Arrive by 12:40 PM)  Provider Visit with Dorian Martínez DO  [facility] ([facility] ) [phone]           Clemencia Ochoa RN

## 2024-07-31 ENCOUNTER — OFFICE VISIT (OUTPATIENT)
Dept: FAMILY MEDICINE | Facility: CLINIC | Age: 34
End: 2024-07-31
Payer: COMMERCIAL

## 2024-07-31 VITALS
BODY MASS INDEX: 38.57 KG/M2 | WEIGHT: 310.2 LBS | OXYGEN SATURATION: 97 % | RESPIRATION RATE: 16 BRPM | HEIGHT: 75 IN | SYSTOLIC BLOOD PRESSURE: 140 MMHG | TEMPERATURE: 98 F | DIASTOLIC BLOOD PRESSURE: 88 MMHG | HEART RATE: 84 BPM

## 2024-07-31 DIAGNOSIS — R73.03 PREDIABETES: ICD-10-CM

## 2024-07-31 DIAGNOSIS — F41.9 ANXIETY: ICD-10-CM

## 2024-07-31 DIAGNOSIS — F41.0 PANIC ATTACKS: ICD-10-CM

## 2024-07-31 DIAGNOSIS — R82.998 FROTHY URINE: ICD-10-CM

## 2024-07-31 DIAGNOSIS — R82.998 DARK URINE: ICD-10-CM

## 2024-07-31 DIAGNOSIS — R42 DIZZINESS: ICD-10-CM

## 2024-07-31 DIAGNOSIS — R80.9 PROTEINURIA, UNSPECIFIED TYPE: ICD-10-CM

## 2024-07-31 DIAGNOSIS — I10 PRIMARY HYPERTENSION: Primary | ICD-10-CM

## 2024-07-31 PROCEDURE — G2211 COMPLEX E/M VISIT ADD ON: HCPCS

## 2024-07-31 PROCEDURE — 99214 OFFICE O/P EST MOD 30 MIN: CPT

## 2024-07-31 RX ORDER — HYDROXYZINE HYDROCHLORIDE 25 MG/1
25 TABLET, FILM COATED ORAL 3 TIMES DAILY PRN
Qty: 90 TABLET | Refills: 0 | Status: SHIPPED | OUTPATIENT
Start: 2024-07-31 | End: 2024-08-22

## 2024-07-31 RX ORDER — LISINOPRIL 10 MG/1
10 TABLET ORAL DAILY
Qty: 90 TABLET | Refills: 0 | Status: SHIPPED | OUTPATIENT
Start: 2024-07-31

## 2024-07-31 ASSESSMENT — PAIN SCALES - GENERAL: PAINLEVEL: NO PAIN (0)

## 2024-07-31 NOTE — PATIENT INSTRUCTIONS
Adding on lisinopril d/t elevated microalbumin levels    Adding on hydroxyzine for anxiety    Follow up at upcoming appointment w/ Dr. Martínez     Future labs placed.

## 2024-07-31 NOTE — PROGRESS NOTES
Assessment & Plan     (I10) Primary hypertension  (primary encounter diagnosis)  Comment: Borderline today.  Like to add on lisinopril to patient's regimen given patient microalbumin levels elevated and proteinuria noted in urine.  Patient currently on 25 mg hydrochlorothiazide.  Suspicion that patient's proteinuria could be causing patient's urine to be a bit darker.  Patient overall kidney function stable.  Patient also had recent kidney ultrasound which came back unremarkable/stable.  Discussed medication risks and benefits of lisinopril with patient in detail with patient verbal understanding. Patient fully understands and is agreeable with plan of care, at this point patient will follow up as needed unless acute concerns arise in the meantime.  Plan: Home Blood Pressure Monitor Order for DME -         ONLY FOR DME, lisinopril (ZESTRIL) 10 MG         tablet, Basic metabolic panel  (Ca, Cl, CO2,         Creat, Gluc, K, Na, BUN)    (R82.998) Dark urine  (R82.998) Frothy urine  Comment: As above.     (R80.9) Proteinuria, unspecified type  Comment: Adding on lisinopril.  Will recheck microalbumin in 3 months with lab only visit.  Plan: Albumin Random Urine Quantitative with Creat         Ratio    (R42) Dizziness  Comment: Unsure of exact etiology.  Could be some vertigo could be occurring.  ED workup for cardiac etiology reassuring.  Patient declines any cardiac and neuro red flags at this time.  At this point would like patient to follow-up if worsening.    (F41.0) Panic attacks  (F41.9) Anxiety  Comment: Patient notably anxious today at visit.  Patient urine color, blood pressure, microalbumin levels have caused him some added stress. Recently started on citalopram 1 1/2 weeks ago from outside provider.  Will add on hydroxyzine as needed.  Follow-up at upcoming visit with outside provider to be reassessed on this.  Plan: hydrOXYzine HCl (ATARAX) 25 MG tablet    (R73.03) Prediabetes  Comment: Noted on A1c.   Appears improved.  Patient currently working on losing weight by increasing his exercise. Continue to monitor at this time.        MED REC REQUIRED  Post Medication Reconciliation Status: discharge medications reconciled and changed, per note/orders      Subjective   Yakov is a 34 year old, presenting for the following health issues:  Hospital F/U (Dizziness) and dark urine        7/31/2024     9:10 AM   Additional Questions   Roomed by Laury Garcia, EMT-B     HPI     ED/UC Followup:    Facility:  [facility] ER  Date of visit: 7/17/24  Reason for visit: Dizziness  Current Status: Improved    ED HPI 7/17/2024   Yakov Cantrell is a 34 year old male who presents for evaluation of dizziness. The patient reports sudden onset of an episode of lightheadedness today while sitting in front of his computer at work. During the episode, he describes that he felt off balance, pressure in his head, and on the verge of passing out. He immediately had to lay down. Later when he got home, he called the nurse triage line. The nurse instructed the patient to be seen here. Denies nausea, vomiting, headache, chest pain, and shortness of breath. The patient adds that a week ago he stopped taking his blood pressure medicine which increased his blood pressure. He was seen at urgent care 5 days ago and received a UA. He was then restarted on his 25 mg of hydrochlorothiazide 5 days ago. Since restarting his medication, the patient reports losing a pound a day despite eating and drinking normally. He also endorses mild dysuria. Denies leg swelling.    Pt would also like to talk about dark / frothy urine issues. Going on since 7/11/24, testing has not shown anything. Would like to discuss what else can be done.     Review of Systems  Constitutional, HEENT, cardiovascular, pulmonary, gi and gu systems are negative, except as otherwise noted.      Objective    BP (!) 140/88 (BP Location: Right arm, Patient Position:  "Sitting, Cuff Size: Adult Large)   Pulse 84   Temp 98  F (36.7  C) (Oral)   Resp 16   Ht 1.892 m (6' 2.5\")   Wt 140.7 kg (310 lb 3.2 oz)   SpO2 97%   BMI 39.29 kg/m    Body mass index is 39.29 kg/m .  Physical Exam  Vitals and nursing note reviewed.   Constitutional:       General: He is not in acute distress.     Appearance: Normal appearance. He is obese. He is not ill-appearing.   Cardiovascular:      Rate and Rhythm: Normal rate.   Pulmonary:      Effort: Pulmonary effort is normal.   Skin:     General: Skin is warm and dry.   Neurological:      Mental Status: He is alert.   Psychiatric:         Mood and Affect: Mood is anxious.         Behavior: Behavior normal.         Thought Content: Thought content normal.         Judgment: Judgment normal.            Signed Electronically by: ARTHUR Mendenhall CNP    "

## 2024-08-01 ENCOUNTER — TELEPHONE (OUTPATIENT)
Dept: FAMILY MEDICINE | Facility: CLINIC | Age: 34
End: 2024-08-01
Payer: COMMERCIAL

## 2024-08-01 NOTE — TELEPHONE ENCOUNTER
Patient Quality Outreach    Patient is due for the following:   Hypertension -  BP check  Physical Preventive Adult Physical    Next Steps:   Patient was scheduled for 8/6/2024    Type of outreach:    Chart review performed, no outreach needed.      Questions for provider review:    None           Anny Peter, CMA

## 2024-08-12 ENCOUNTER — TELEPHONE (OUTPATIENT)
Dept: FAMILY MEDICINE | Facility: CLINIC | Age: 34
End: 2024-08-12
Payer: COMMERCIAL

## 2024-08-12 NOTE — TELEPHONE ENCOUNTER
Patient Quality Outreach    Patient is due for the following:   Hypertension -  BP check    Next Steps:   Patient has upcoming appointment, these items will be addressed at that time.    Type of outreach:    Chart review performed, no outreach needed.      Questions for provider review:    None           Mary Ann Davis, CMA

## 2024-08-14 ENCOUNTER — OFFICE VISIT (OUTPATIENT)
Dept: FAMILY MEDICINE | Facility: CLINIC | Age: 34
End: 2024-08-14
Payer: COMMERCIAL

## 2024-08-14 VITALS
HEIGHT: 75 IN | TEMPERATURE: 98.6 F | DIASTOLIC BLOOD PRESSURE: 83 MMHG | WEIGHT: 305 LBS | BODY MASS INDEX: 37.92 KG/M2 | OXYGEN SATURATION: 97 % | RESPIRATION RATE: 18 BRPM | HEART RATE: 86 BPM | SYSTOLIC BLOOD PRESSURE: 134 MMHG

## 2024-08-14 DIAGNOSIS — R80.9 PROTEINURIA, UNSPECIFIED TYPE: ICD-10-CM

## 2024-08-14 DIAGNOSIS — J30.89 NON-SEASONAL ALLERGIC RHINITIS DUE TO FUNGAL SPORES: Primary | ICD-10-CM

## 2024-08-14 DIAGNOSIS — F41.0 PANIC ATTACKS: ICD-10-CM

## 2024-08-14 DIAGNOSIS — F41.9 ANXIETY: ICD-10-CM

## 2024-08-14 DIAGNOSIS — R82.998 URINARY CAST, HYALINE: ICD-10-CM

## 2024-08-14 PROBLEM — F12.20: Status: RESOLVED | Noted: 2022-09-06 | Resolved: 2024-08-14

## 2024-08-14 LAB
ALBUMIN UR-MCNC: 100 MG/DL
ANION GAP SERPL CALCULATED.3IONS-SCNC: 10 MMOL/L (ref 7–15)
APPEARANCE UR: CLEAR
BACTERIA #/AREA URNS HPF: ABNORMAL /HPF
BILIRUB UR QL STRIP: NEGATIVE
BUN SERPL-MCNC: 13.1 MG/DL (ref 6–20)
CALCIUM SERPL-MCNC: 10.2 MG/DL (ref 8.8–10.4)
CHLORIDE SERPL-SCNC: 100 MMOL/L (ref 98–107)
COLOR UR AUTO: YELLOW
CREAT SERPL-MCNC: 0.84 MG/DL (ref 0.67–1.17)
CREAT UR-MCNC: 267 MG/DL
EGFRCR SERPLBLD CKD-EPI 2021: >90 ML/MIN/1.73M2
GLUCOSE SERPL-MCNC: 125 MG/DL (ref 70–99)
GLUCOSE UR STRIP-MCNC: NEGATIVE MG/DL
HCO3 SERPL-SCNC: 29 MMOL/L (ref 22–29)
HGB UR QL STRIP: NEGATIVE
HYALINE CASTS #/AREA URNS LPF: ABNORMAL /LPF
KETONES UR STRIP-MCNC: ABNORMAL MG/DL
LEUKOCYTE ESTERASE UR QL STRIP: NEGATIVE
MICROALBUMIN UR-MCNC: 199 MG/L
MICROALBUMIN/CREAT UR: 74.53 MG/G CR (ref 0–17)
MUCOUS THREADS #/AREA URNS LPF: PRESENT /LPF
NITRATE UR QL: NEGATIVE
PH UR STRIP: 5.5 [PH] (ref 5–7)
POTASSIUM SERPL-SCNC: 4.5 MMOL/L (ref 3.4–5.3)
RBC #/AREA URNS AUTO: ABNORMAL /HPF
SODIUM SERPL-SCNC: 139 MMOL/L (ref 135–145)
SP GR UR STRIP: 1.02 (ref 1–1.03)
SQUAMOUS #/AREA URNS AUTO: ABNORMAL /LPF
UROBILINOGEN UR STRIP-ACNC: 0.2 E.U./DL
WBC #/AREA URNS AUTO: ABNORMAL /HPF

## 2024-08-14 PROCEDURE — 36415 COLL VENOUS BLD VENIPUNCTURE: CPT | Performed by: FAMILY MEDICINE

## 2024-08-14 PROCEDURE — 82570 ASSAY OF URINE CREATININE: CPT | Performed by: FAMILY MEDICINE

## 2024-08-14 PROCEDURE — 81001 URINALYSIS AUTO W/SCOPE: CPT | Performed by: FAMILY MEDICINE

## 2024-08-14 PROCEDURE — 82043 UR ALBUMIN QUANTITATIVE: CPT | Performed by: FAMILY MEDICINE

## 2024-08-14 PROCEDURE — 99214 OFFICE O/P EST MOD 30 MIN: CPT | Performed by: FAMILY MEDICINE

## 2024-08-14 PROCEDURE — 80048 BASIC METABOLIC PNL TOTAL CA: CPT | Performed by: FAMILY MEDICINE

## 2024-08-14 RX ORDER — CETIRIZINE HYDROCHLORIDE 10 MG/1
10 TABLET ORAL DAILY
COMMUNITY
Start: 2024-08-14

## 2024-08-14 RX ORDER — CITALOPRAM HYDROBROMIDE 20 MG/1
20 TABLET ORAL DAILY
Qty: 30 TABLET | Refills: 1 | Status: SHIPPED | OUTPATIENT
Start: 2024-08-14

## 2024-08-14 ASSESSMENT — ANXIETY QUESTIONNAIRES
IF YOU CHECKED OFF ANY PROBLEMS ON THIS QUESTIONNAIRE, HOW DIFFICULT HAVE THESE PROBLEMS MADE IT FOR YOU TO DO YOUR WORK, TAKE CARE OF THINGS AT HOME, OR GET ALONG WITH OTHER PEOPLE: SOMEWHAT DIFFICULT
GAD7 TOTAL SCORE: 11
7. FEELING AFRAID AS IF SOMETHING AWFUL MIGHT HAPPEN: NEARLY EVERY DAY
GAD7 TOTAL SCORE: 11
6. BECOMING EASILY ANNOYED OR IRRITABLE: NOT AT ALL
1. FEELING NERVOUS, ANXIOUS, OR ON EDGE: MORE THAN HALF THE DAYS
5. BEING SO RESTLESS THAT IT IS HARD TO SIT STILL: SEVERAL DAYS
8. IF YOU CHECKED OFF ANY PROBLEMS, HOW DIFFICULT HAVE THESE MADE IT FOR YOU TO DO YOUR WORK, TAKE CARE OF THINGS AT HOME, OR GET ALONG WITH OTHER PEOPLE?: SOMEWHAT DIFFICULT
7. FEELING AFRAID AS IF SOMETHING AWFUL MIGHT HAPPEN: NEARLY EVERY DAY
GAD7 TOTAL SCORE: 11
2. NOT BEING ABLE TO STOP OR CONTROL WORRYING: MORE THAN HALF THE DAYS
4. TROUBLE RELAXING: SEVERAL DAYS
3. WORRYING TOO MUCH ABOUT DIFFERENT THINGS: MORE THAN HALF THE DAYS

## 2024-08-14 ASSESSMENT — PATIENT HEALTH QUESTIONNAIRE - PHQ9
SUM OF ALL RESPONSES TO PHQ QUESTIONS 1-9: 5
10. IF YOU CHECKED OFF ANY PROBLEMS, HOW DIFFICULT HAVE THESE PROBLEMS MADE IT FOR YOU TO DO YOUR WORK, TAKE CARE OF THINGS AT HOME, OR GET ALONG WITH OTHER PEOPLE: SOMEWHAT DIFFICULT
SUM OF ALL RESPONSES TO PHQ QUESTIONS 1-9: 5

## 2024-08-14 NOTE — PROGRESS NOTES
Assessment & Plan   See after visit summary and result note for helpful information and advice given to patient.    Approximately 30 minutes spent discussing past medical history, past lab results, current patient concerns, on physical exam, on proposed management, and documentation.    Proteinuria, unspecified type    - UA Macroscopic with reflex to Microscopic and Culture  - Basic metabolic panel  - Albumin Random Urine Quantitative with Creat Ratio  - UA Microscopic with Reflex to Culture  - Adult Nephrology  Referral    Non-seasonal allergic rhinitis due to fungal spores    - cetirizine (ZYRTEC) 10 MG tablet    Anxiety    - citalopram (CELEXA) 20 MG tablet  Dispense: 30 tablet; Refill: 1    Panic attacks    - citalopram (CELEXA) 20 MG tablet  Dispense: 30 tablet; Refill: 1    Urinary cast, hyaline    - Adult Nephrology  Referral        Noelle Nagy is a 34 year old, presenting for the following health issues:  Hypertension and Abnormal Labs (Protein found in urine)        8/14/2024    12:46 PM   Additional Questions   Roomed by NAHID Gallegos   Accompanied by Self     History of Present Illness       Mental Health Follow-up:  Patient presents to follow-up on Anxiety.    Patient's anxiety since last visit has been:  Medium  The patient is having other symptoms associated with anxiety.  Any significant life events: job concerns  Patient is feeling anxious or having panic attacks.  Patient has no concerns about alcohol or drug use.    Hypertension: He presents for follow up of hypertension.  He does not check blood pressure  regularly outside of the clinic. Outside blood pressures have been over 140/90. He does not follow a low salt diet.     Reason for visit:  Protein in urine    He eats 0-1 servings of fruits and vegetables daily.He consumes 1 sweetened beverage(s) daily.He exercises with enough effort to increase his heart rate 20 to 29 minutes per day.  He exercises with enough  "effort to increase his heart rate 4 days per week.   He is taking medications regularly.                 Review of Systems  Patient visit done to continue management of urine proteinuria, and discussed mental health concerns.    About 3 PM daily he feels flushed with high anxiety, dry mouth, and pressure in head and dizziness.     Patient gets heart palpitations at bedtime he feels is related to anxiety.     He is worried about his continued frothy urine.     He is wondering if he is having panic attacks.     On medication review, patient states he takes Zyrtec daily for nasal allergies.     Patient feels he is less anxious restarting his citalopram. His wife feels he is happier.  We will continue this treatment.  He feels he could still be doing better, and is agreeable with my suggestion of increasing dosage to 20 mg daily.    Patient last used Cannabis 07/06/2024.  He is trying to stop using this.        Objective    /83 (BP Location: Right arm, Patient Position: Sitting, Cuff Size: Adult Large)   Pulse 86   Temp 98.6  F (37  C) (Oral)   Resp 18   Ht 1.892 m (6' 2.5\")   Wt 138.3 kg (305 lb)   SpO2 97%   BMI 38.64 kg/m    Body mass index is 38.64 kg/m .  Physical Exam   Vital signs reviewed.  Patient is in no acute appearing distress.  Breathing appears nonlabored.  Patient is alert and oriented ×3.  Patient is pleasant, making good eye contact and responding with clear fluent speech.    Heart: Heart rate is regular without murmur.    Lungs: Lungs are clear to auscultation with good airflow bilaterally.    Skin/extremities: Warm and dry, with no lower leg edema.    Exam Psych: See earlier documentation.  In addition, patient answers questions appropriately.  No pressured speech.  No psychomotor agitation.            Signed Electronically by: Dorian Martínez DO    Answers submitted by the patient for this visit:  Patient Health Questionnaire (Submitted on 8/14/2024)  If you checked off any problems, how " difficult have these problems made it for you to do your work, take care of things at home, or get along with other people?: Somewhat difficult  PHQ9 TOTAL SCORE: 5  LEIGH-7 (Submitted on 8/14/2024)  LEIGH 7 TOTAL SCORE: 11  Hypertension Visit (Submitted on 8/14/2024)  Chief Complaint: Chronic problems general questions HPI Form  Do you check your blood pressure regularly outside of the clinic?: No  Are your blood pressures ever more than 140 on the top number (systolic) OR more than 90 on the bottom number (diastolic)? (For example, greater than 140/90): Yes  Are you following a low salt diet?: No  Depression / Anxiety Questionnaire (Submitted on 8/14/2024)  Chief Complaint: Chronic problems general questions HPI Form  Depression/Anxiety: Anxiety  Anxiety only (Submitted on 8/14/2024)  Chief Complaint: Chronic problems general questions HPI Form  Anxiety since last: : medium  Other associated symotome: : Yes  Significant life event: : job concerns  Anxious:: Yes  Current substance use:: No  General Questionnaire (Submitted on 8/14/2024)  Chief Complaint: Chronic problems general questions HPI Form  What is the reason for your visit today? : protein in urine  How many servings of fruits and vegetables do you eat daily?: 0-1  On average, how many sweetened beverages do you drink each day (Examples: soda, juice, sweet tea, etc.  Do NOT count diet or artificially sweetened beverages)?: 1  How many minutes a day do you exercise enough to make your heart beat faster?: 20 to 29  How many days a week do you exercise enough to make your heart beat faster?: 4  How many days per week do you miss taking your medication?: 0

## 2024-08-16 ENCOUNTER — MYC MEDICAL ADVICE (OUTPATIENT)
Dept: FAMILY MEDICINE | Facility: CLINIC | Age: 34
End: 2024-08-16
Payer: COMMERCIAL

## 2024-08-16 DIAGNOSIS — R80.9 PROTEINURIA, UNSPECIFIED TYPE: Primary | ICD-10-CM

## 2024-08-16 DIAGNOSIS — R82.998 FROTHY URINE: ICD-10-CM

## 2024-08-22 DIAGNOSIS — F41.0 PANIC ATTACKS: ICD-10-CM

## 2024-08-22 DIAGNOSIS — F41.9 ANXIETY: ICD-10-CM

## 2024-08-22 RX ORDER — HYDROXYZINE HYDROCHLORIDE 25 MG/1
25 TABLET, FILM COATED ORAL 3 TIMES DAILY PRN
Qty: 270 TABLET | Refills: 0 | Status: SHIPPED | OUTPATIENT
Start: 2024-08-22

## 2024-08-28 DIAGNOSIS — F41.0 PANIC ATTACKS: ICD-10-CM

## 2024-08-28 DIAGNOSIS — F41.9 ANXIETY: ICD-10-CM

## 2024-08-28 RX ORDER — CITALOPRAM HYDROBROMIDE 20 MG/1
20 TABLET ORAL DAILY
Qty: 90 TABLET | Refills: 1 | OUTPATIENT
Start: 2024-08-28

## 2024-10-15 DIAGNOSIS — F41.0 PANIC ATTACKS: ICD-10-CM

## 2024-10-15 DIAGNOSIS — I10 PRIMARY HYPERTENSION: ICD-10-CM

## 2024-10-15 DIAGNOSIS — I10 BENIGN ESSENTIAL HYPERTENSION: ICD-10-CM

## 2024-10-15 NOTE — TELEPHONE ENCOUNTER
Clinic RN: Please investigate patient's chart or contact patient if the information cannot be found because 10 mg dose was discontinued. Per chart review, patient was prescribed increased dose in August. Confirm what dose patient is taking and/or requesting. Document findings and route refill encounter to provider for approval or denial.     citalopram (CELEXA) 20 MG tablet 30 tablet 1 8/14/2024     Zaina WOLF RN  [facility]

## 2024-10-16 NOTE — TELEPHONE ENCOUNTER
RN attempted (attempt #2) to contact patient  Left VM requesting call back to [phone] and ask to speak with a member of the care team, awaiting call back at this time   Sent Vineloop message      Zaina WOLF RN  [facility]

## 2024-10-17 RX ORDER — CITALOPRAM HYDROBROMIDE 10 MG/1
10 TABLET ORAL DAILY
Qty: 90 TABLET | Refills: 0 | OUTPATIENT
Start: 2024-10-17

## 2024-10-17 RX ORDER — HYDROCHLOROTHIAZIDE 25 MG/1
25 TABLET ORAL DAILY
Qty: 90 TABLET | Refills: 0 | Status: SHIPPED | OUTPATIENT
Start: 2024-10-17

## 2024-10-17 RX ORDER — LISINOPRIL 10 MG/1
10 TABLET ORAL DAILY
Qty: 90 TABLET | Refills: 0 | Status: SHIPPED | OUTPATIENT
Start: 2024-10-17

## 2024-10-17 NOTE — TELEPHONE ENCOUNTER
Spoke to patient. He was taking 10mg and had not increased to 20mg yet but would like to. He will call pharmacy to  rx for 20mg that was sent in August.     He is asking for refills of lisinopril and hydrochlorothiazide, will submit to refill pool.    Marylou Hdez RN on 10/17/2024 at 11:30 AM

## 2024-11-16 ENCOUNTER — MYC REFILL (OUTPATIENT)
Dept: FAMILY MEDICINE | Facility: CLINIC | Age: 34
End: 2024-11-16
Payer: COMMERCIAL

## 2024-11-16 DIAGNOSIS — F41.0 PANIC ATTACKS: ICD-10-CM

## 2024-11-16 DIAGNOSIS — F41.9 ANXIETY: ICD-10-CM

## 2024-11-18 RX ORDER — CITALOPRAM HYDROBROMIDE 20 MG/1
20 TABLET ORAL DAILY
Qty: 90 TABLET | Refills: 1 | Status: SHIPPED | OUTPATIENT
Start: 2024-11-18

## 2024-11-19 NOTE — CONFIDENTIAL NOTE
DIAGNOSIS:   Proteinuria, unspecified type   Urinary cast, hyaline      DATE RECEIVED:  12.19.2024     NOTES STATUS DETAILS   OFFICE NOTE from referring provider Internal 08.14.2024 Dorian Martínez DO    OFFICE NOTE from other specialist  Internal 07.31.2024  Camron Oliveira APRN CNP    MEDICATION LIST Internal    IMAGING  (NEED IMAGES AND REPORTS)     KIDNEY ULTRASOUND Internal 07.19.2024 US Renal Complete    LABS     CBC Internal 07.17.2024   CMP Internal 07.17.2024   BMP Internal 08.14.2024   UA Internal 08.14.2024   URINE PROTEIN Internal 08.14.2024

## 2024-11-21 ENCOUNTER — MYC REFILL (OUTPATIENT)
Dept: FAMILY MEDICINE | Facility: CLINIC | Age: 34
End: 2024-11-21
Payer: COMMERCIAL

## 2024-11-21 DIAGNOSIS — J45.30 MILD PERSISTENT ASTHMA, UNSPECIFIED WHETHER COMPLICATED: Primary | ICD-10-CM

## 2024-11-21 RX ORDER — ALBUTEROL SULFATE 90 UG/1
2 INHALANT RESPIRATORY (INHALATION) EVERY 6 HOURS PRN
Qty: 18 G | Refills: 1 | Status: SHIPPED | OUTPATIENT
Start: 2024-11-21

## 2024-12-07 ENCOUNTER — MYC MEDICAL ADVICE (OUTPATIENT)
Dept: FAMILY MEDICINE | Facility: CLINIC | Age: 34
End: 2024-12-07
Payer: COMMERCIAL

## 2024-12-10 ENCOUNTER — E-VISIT (OUTPATIENT)
Dept: FAMILY MEDICINE | Facility: CLINIC | Age: 34
End: 2024-12-10
Payer: COMMERCIAL

## 2024-12-10 DIAGNOSIS — F41.9 ANXIETY: ICD-10-CM

## 2024-12-10 DIAGNOSIS — F41.0 PANIC ATTACKS: ICD-10-CM

## 2024-12-10 DIAGNOSIS — J06.9 VIRAL URI WITH COUGH: Primary | ICD-10-CM

## 2024-12-10 RX ORDER — CITALOPRAM HYDROBROMIDE 20 MG/1
10 TABLET ORAL DAILY
Qty: 90 TABLET | Refills: 1 | Status: SHIPPED | OUTPATIENT
Start: 2024-12-10

## 2024-12-10 NOTE — LETTER
December 10, 2024      Yakov Cantrell  [address]  [place] [address]        To Whom It May Concern:    Yakov LIPSCOMB Óscar  was evaluated by me on 12/10/2024.  Please excuse him from work due to illness from 12/09/2024, through 12/10/2024 due to his illness.      Sincerely,  Dorian Martínez, DO on 12/10/2024 at 2:27 PM

## 2024-12-10 NOTE — TELEPHONE ENCOUNTER
Provider E-Visit time total (minutes): 15    I called patient and spoke with him about how he was feeling.  His shortness of breath is improved with use of his albuterol medication.  There was no conversational dyspnea during conversation, with patient able to speak in full sentences.  Patient answered questions appropriately and nonpressured speech.    Patient plans to do a home COVID 19 test today.  I told him he could send me home test results if he was interested in antiviral treatment if positive.  Patient has several recent close contacts who have tested positive for COVID-19.    Patient feels he should be able to resume work remotely from home tomorrow.  Work excuse letter was requested and written, which she will access via [employer].    Patient wanted to discuss his anxiety medication treatment.    Kristyn had severe anxiety with hand tremors, felt dizzy, and sensation of tremors up back when he ran out of his 10 mg dose of citalopram. He went 3 weeks without taking medication.     Patient never filled either the 20 mg dose of citaloprom from me, and did later fill recent one from Dr Stark.     Patient felt he was doing emotionally on 10 mg daily right before stopping taking medication.     Patient does not have any thoughts of self-harm at time of phone conversation, or recently.    I reviewed our last visit notes from August of this year, when we considered increasing dosage of citalopram from 10 mg to 20 mg.  Considering how he felt well more recently on the 10 mg daily dosage, I suggested restarting taking medication at this dosage, and then increasing dosage to 20 mg after 2 weeks if he noted improvement in anxiety but felt he needed more improvement.  See my notes to patient under patient instructions.    Medication list updated to reflect the 10 mg dosage of citalopram using 1/2 tablet.    Dorian Martínez, DO on 12/10/2024 at 3:50 PM

## 2024-12-19 ENCOUNTER — PRE VISIT (OUTPATIENT)
Dept: NEPHROLOGY | Facility: CLINIC | Age: 34
End: 2024-12-19

## 2024-12-19 ENCOUNTER — LAB (OUTPATIENT)
Dept: LAB | Facility: CLINIC | Age: 34
End: 2024-12-19
Payer: COMMERCIAL

## 2024-12-19 DIAGNOSIS — D63.1 ANEMIA IN CHRONIC KIDNEY DISEASE, UNSPECIFIED CKD STAGE: ICD-10-CM

## 2024-12-19 DIAGNOSIS — D63.1 ANEMIA IN CHRONIC KIDNEY DISEASE, UNSPECIFIED CKD STAGE: Primary | ICD-10-CM

## 2024-12-19 DIAGNOSIS — R80.9 PROTEINURIA, UNSPECIFIED TYPE: ICD-10-CM

## 2024-12-19 DIAGNOSIS — N18.9 ANEMIA IN CHRONIC KIDNEY DISEASE, UNSPECIFIED CKD STAGE: ICD-10-CM

## 2024-12-19 DIAGNOSIS — R82.998 FROTHY URINE: ICD-10-CM

## 2024-12-19 DIAGNOSIS — N18.9 ANEMIA IN CHRONIC KIDNEY DISEASE, UNSPECIFIED CKD STAGE: Primary | ICD-10-CM

## 2024-12-19 LAB
ALBUMIN MFR UR ELPH: 22 MG/DL
ALBUMIN SERPL BCG-MCNC: 4.5 G/DL (ref 3.5–5.2)
ALBUMIN UR-MCNC: NEGATIVE MG/DL
ALBUMIN UR-MCNC: NEGATIVE MG/DL
ANION GAP SERPL CALCULATED.3IONS-SCNC: 11 MMOL/L (ref 7–15)
APPEARANCE UR: CLEAR
APPEARANCE UR: CLEAR
BILIRUB UR QL STRIP: NEGATIVE
BILIRUB UR QL STRIP: NEGATIVE
BUN SERPL-MCNC: 11.9 MG/DL (ref 6–20)
CALCIUM SERPL-MCNC: 9.6 MG/DL (ref 8.8–10.4)
CHLORIDE SERPL-SCNC: 98 MMOL/L (ref 98–107)
COLOR UR AUTO: NORMAL
COLOR UR AUTO: NORMAL
CREAT SERPL-MCNC: 0.79 MG/DL (ref 0.67–1.17)
CREAT UR-MCNC: 217 MG/DL
CREAT UR-MCNC: 222 MG/DL
CRP SERPL-MCNC: 5.62 MG/L
EGFRCR SERPLBLD CKD-EPI 2021: >90 ML/MIN/1.73M2
ERYTHROCYTE [DISTWIDTH] IN BLOOD BY AUTOMATED COUNT: 12.5 % (ref 10–15)
ERYTHROCYTE [SEDIMENTATION RATE] IN BLOOD BY WESTERGREN METHOD: 13 MM/HR (ref 0–15)
GLUCOSE SERPL-MCNC: 101 MG/DL (ref 70–99)
GLUCOSE UR STRIP-MCNC: NEGATIVE MG/DL
GLUCOSE UR STRIP-MCNC: NEGATIVE MG/DL
HCO3 SERPL-SCNC: 29 MMOL/L (ref 22–29)
HCT VFR BLD AUTO: 43.5 % (ref 40–53)
HGB BLD-MCNC: 14.6 G/DL (ref 13.3–17.7)
HGB UR QL STRIP: NEGATIVE
HGB UR QL STRIP: NEGATIVE
KETONES UR STRIP-MCNC: NEGATIVE MG/DL
KETONES UR STRIP-MCNC: NEGATIVE MG/DL
LEUKOCYTE ESTERASE UR QL STRIP: NEGATIVE
LEUKOCYTE ESTERASE UR QL STRIP: NEGATIVE
MCH RBC QN AUTO: 27.9 PG (ref 26.5–33)
MCHC RBC AUTO-ENTMCNC: 33.6 G/DL (ref 31.5–36.5)
MCV RBC AUTO: 83 FL (ref 78–100)
MICROALBUMIN UR-MCNC: 74.8 MG/L
MICROALBUMIN/CREAT UR: 33.69 MG/G CR (ref 0–17)
NITRATE UR QL: NEGATIVE
NITRATE UR QL: NEGATIVE
PH UR STRIP: 7 [PH] (ref 5–7)
PH UR STRIP: 7 [PH] (ref 5–7)
PHOSPHATE SERPL-MCNC: 2.8 MG/DL (ref 2.5–4.5)
PLATELET # BLD AUTO: 354 10E3/UL (ref 150–450)
POTASSIUM SERPL-SCNC: 4.2 MMOL/L (ref 3.4–5.3)
PROT/CREAT 24H UR: 0.1 MG/MG CR (ref 0–0.2)
RBC # BLD AUTO: 5.23 10E6/UL (ref 4.4–5.9)
RBC URINE: 1 /HPF
SODIUM SERPL-SCNC: 138 MMOL/L (ref 135–145)
SP GR UR STRIP: 1.03 (ref 1–1.03)
SP GR UR STRIP: 1.03 (ref 1–1.03)
UROBILINOGEN UR STRIP-MCNC: NORMAL MG/DL
UROBILINOGEN UR STRIP-MCNC: NORMAL MG/DL
WBC # BLD AUTO: 10.4 10E3/UL (ref 4–11)
WBC URINE: <1 /HPF

## 2024-12-19 PROCEDURE — 82043 UR ALBUMIN QUANTITATIVE: CPT | Performed by: FAMILY MEDICINE

## 2024-12-19 PROCEDURE — 81001 URINALYSIS AUTO W/SCOPE: CPT | Performed by: PATHOLOGY

## 2024-12-19 PROCEDURE — 99000 SPECIMEN HANDLING OFFICE-LAB: CPT | Performed by: PATHOLOGY

## 2024-12-19 PROCEDURE — 86140 C-REACTIVE PROTEIN: CPT | Performed by: PATHOLOGY

## 2024-12-19 PROCEDURE — 84156 ASSAY OF PROTEIN URINE: CPT | Performed by: PATHOLOGY

## 2024-12-19 PROCEDURE — 82570 ASSAY OF URINE CREATININE: CPT | Performed by: FAMILY MEDICINE

## 2024-12-19 PROCEDURE — 80069 RENAL FUNCTION PANEL: CPT | Performed by: PATHOLOGY

## 2024-12-19 PROCEDURE — 85027 COMPLETE CBC AUTOMATED: CPT | Performed by: PATHOLOGY

## 2024-12-19 PROCEDURE — 85652 RBC SED RATE AUTOMATED: CPT | Performed by: PATHOLOGY

## 2024-12-19 PROCEDURE — 36415 COLL VENOUS BLD VENIPUNCTURE: CPT | Performed by: PATHOLOGY

## 2025-01-08 ENCOUNTER — MYC REFILL (OUTPATIENT)
Dept: NEPHROLOGY | Facility: CLINIC | Age: 35
End: 2025-01-08
Payer: COMMERCIAL

## 2025-01-08 ENCOUNTER — MYC REFILL (OUTPATIENT)
Dept: FAMILY MEDICINE | Facility: CLINIC | Age: 35
End: 2025-01-08
Payer: COMMERCIAL

## 2025-01-08 DIAGNOSIS — I10 PRIMARY HYPERTENSION: ICD-10-CM

## 2025-01-08 DIAGNOSIS — I10 BENIGN ESSENTIAL HYPERTENSION: ICD-10-CM

## 2025-01-08 DIAGNOSIS — J45.30 MILD PERSISTENT ASTHMA, UNSPECIFIED WHETHER COMPLICATED: ICD-10-CM

## 2025-01-08 RX ORDER — ALBUTEROL SULFATE 90 UG/1
2 INHALANT RESPIRATORY (INHALATION) EVERY 6 HOURS PRN
Qty: 18 G | Refills: 1 | Status: SHIPPED | OUTPATIENT
Start: 2025-01-08

## 2025-01-08 RX ORDER — LISINOPRIL 20 MG/1
20 TABLET ORAL DAILY
Qty: 90 TABLET | Refills: 3 | Status: SHIPPED | OUTPATIENT
Start: 2025-01-08

## 2025-01-08 RX ORDER — HYDROCHLOROTHIAZIDE 25 MG/1
25 TABLET ORAL DAILY
Qty: 90 TABLET | Refills: 0 | Status: SHIPPED | OUTPATIENT
Start: 2025-01-08 | End: 2025-01-08

## 2025-02-03 ENCOUNTER — TELEPHONE (OUTPATIENT)
Dept: NEPHROLOGY | Facility: CLINIC | Age: 35
End: 2025-02-03
Payer: COMMERCIAL

## 2025-02-03 NOTE — TELEPHONE ENCOUNTER
Left Voicemail (2nd Attempt) for the patient to call back and schedule the following:    Appointment type: ARIAN  Provider: BEKA  Return date: 06/19/2025  Specialty phone number: [phone]  Additional appointment(s) needed: LABS  Additonal Notes: N/A

## 2025-03-05 ENCOUNTER — NURSE TRIAGE (OUTPATIENT)
Dept: FAMILY MEDICINE | Facility: CLINIC | Age: 35
End: 2025-03-05
Payer: COMMERCIAL

## 2025-03-05 NOTE — TELEPHONE ENCOUNTER
Nurse Triage SBAR    Is this a 2nd Level Triage? NO    Situation: patient calls, has been having weakness and pain in left pinky and ring finger.    Background:  has been ongoing for a few weeks. Unknown cause. No injury known. 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  States once in a while his pinky/ring on his left hand will go numb. Has mild pain. Some elbow positions make it worse. No fever or swelling.  Assessment: possible carpal tunnel.    Protocol Recommended Disposition:   See in Office Today  Patient declines Urgent care. Wants to be seen by PCP if an evaluation is needed. Agrees to come in on Friday 3/7/25. He has lots of work commitments. Appointment made  Recommendation: come to appt this coming Friday.     Routed to provider    Does the patient meet one of the following criteria for ADS visit consideration? 16+ years old, with an [facility] PCP     TIP  Providers, please consider if this condition is appropriate for management at one of our Acute and Diagnostic Services sites.     If patient is a good candidate, please use dotphrase <dot>triageresponse and select Refer to ADS to document.    Reason for Disposition   Weakness (i.e., loss of strength) of new-onset in hand or finger   (Exception: Not truly weak, hand feels weak because of pain.)    Additional Information   Negative: Similar pain previously and it was from 'heart attack'   Negative: Similar pain previously from 'angina' and not relieved by nitroglycerin   Negative: Sounds like a life-threatening emergency to the triager   Negative: Followed a hand or wrist injury   Negative: Chest pain   Negative: Caused by an animal bite   Negative: Wound looks infected   Negative: Fever and red area (or area very tender to touch)   Negative: Swollen joint and fever   Negative: Patient sounds very sick or weak to the triager   Negative: SEVERE pain (e.g., excruciating, unable to use hand at all)   Negative: Red area or streak > 2 inches  "(or 5 cm)    Answer Assessment - Initial Assessment Questions  1. ONSET: \"When did the pain start?\"      About 2 weeks  2. LOCATION: \"Where is the pain located?\"      Pink and ring finger of left hand  3. PAIN: \"How bad is the pain?\" (Scale 1-10; or mild, moderate, severe)    - MILD (1-3): doesn't interfere with normal activities    - MODERATE (4-7): interferes with normal activities (e.g., work or school) or awakens from sleep    - SEVERE (8-10): excruciating pain, unable to use hand at all      Mild pain.  4. WORK OR EXERCISE: \"Has there been any recent work or exercise that involved this part (i.e., hand or wrist) of the body?\"      no  5. CAUSE: \"What do you think is causing the pain?\"      Possible carpal tunnel  6. AGGRAVATING FACTORS: \"What makes the pain worse?\" (e.g., using computer)      Some movements or lifting some items  7. OTHER SYMPTOMS: \"Do you have any other symptoms?\" (e.g., neck pain, swelling, rash, numbness, fever)      Numbness at times, minimal amount of numbness. No fever or rash.  8. PREGNANCY: \"Is there any chance you are pregnant?\" \"When was your last menstrual period?\"      N/a    Protocols used: Hand and Wrist Pain-A-[place]    "

## 2025-03-07 ENCOUNTER — OFFICE VISIT (OUTPATIENT)
Dept: FAMILY MEDICINE | Facility: CLINIC | Age: 35
End: 2025-03-07
Payer: COMMERCIAL

## 2025-03-07 VITALS
TEMPERATURE: 99.2 F | HEART RATE: 90 BPM | OXYGEN SATURATION: 95 % | HEIGHT: 75 IN | WEIGHT: 311 LBS | SYSTOLIC BLOOD PRESSURE: 130 MMHG | BODY MASS INDEX: 38.67 KG/M2 | DIASTOLIC BLOOD PRESSURE: 91 MMHG

## 2025-03-07 DIAGNOSIS — R20.0 NUMBNESS OF FINGERS: ICD-10-CM

## 2025-03-07 DIAGNOSIS — R73.03 PREDIABETES: ICD-10-CM

## 2025-03-07 DIAGNOSIS — M25.522 LEFT ELBOW PAIN: Primary | ICD-10-CM

## 2025-03-07 PROCEDURE — 3075F SYST BP GE 130 - 139MM HG: CPT | Performed by: FAMILY MEDICINE

## 2025-03-07 PROCEDURE — 99213 OFFICE O/P EST LOW 20 MIN: CPT | Performed by: FAMILY MEDICINE

## 2025-03-07 PROCEDURE — 3079F DIAST BP 80-89 MM HG: CPT | Performed by: FAMILY MEDICINE

## 2025-03-07 PROCEDURE — G2211 COMPLEX E/M VISIT ADD ON: HCPCS | Performed by: FAMILY MEDICINE

## 2025-03-07 ASSESSMENT — ASTHMA QUESTIONNAIRES
QUESTION_3 LAST FOUR WEEKS HOW OFTEN DID YOUR ASTHMA SYMPTOMS (WHEEZING, COUGHING, SHORTNESS OF BREATH, CHEST TIGHTNESS OR PAIN) WAKE YOU UP AT NIGHT OR EARLIER THAN USUAL IN THE MORNING: ONCE OR TWICE
QUESTION_4 LAST FOUR WEEKS HOW OFTEN HAVE YOU USED YOUR RESCUE INHALER OR NEBULIZER MEDICATION (SUCH AS ALBUTEROL): ONCE A WEEK OR LESS
ACT_TOTALSCORE: 20
QUESTION_2 LAST FOUR WEEKS HOW OFTEN HAVE YOU HAD SHORTNESS OF BREATH: ONCE OR TWICE A WEEK
QUESTION_1 LAST FOUR WEEKS HOW MUCH OF THE TIME DID YOUR ASTHMA KEEP YOU FROM GETTING AS MUCH DONE AT WORK, SCHOOL OR AT HOME: A LITTLE OF THE TIME
ACT_TOTALSCORE: 20
QUESTION_5 LAST FOUR WEEKS HOW WOULD YOU RATE YOUR ASTHMA CONTROL: WELL CONTROLLED

## 2025-03-07 ASSESSMENT — ENCOUNTER SYMPTOMS: NUMBNESS: 1

## 2025-03-07 NOTE — PROGRESS NOTES
"  Assessment & Plan   See after visit summary and result note for helpful information and advice given to patient.    Patient was agreeable with my suggestion of referral to orthopedic specialist for further evaluation.  I advised him if he should start having weakness in addition to his sensory changes in left upper extremity, he should let me know because this is indicative of needing more urgent management.    Future hemoglobin A1c ordered for lab management of his prediabetes.    A total of 20 minutes was spent discussing with patient past medical history and management, on current concerns, on physical exam, on ongoing assessment and management, and on documentation.    The longitudinal plan of care for the diagnosis(es)/condition(s) as documented were addressed during this visit. Due to the added complexity in care, I will continue to support Yakov in the subsequent management and with ongoing continuity of care.    Left elbow pain    - Orthopedic  Referral    Numbness of fingers    - Orthopedic  Referral    Prediabetes    - Hemoglobin A1c      BMI  Estimated body mass index is 39.4 kg/m  as calculated from the following:    Height as of this encounter: 1.892 m (6' 2.5\").    Weight as of this encounter: 141.1 kg (311 lb).             Subjective   Yakov is a 34 year old, presenting for the following health issues:  Numbness (- left hand pinky and ring finger for the past 2 weeks)        3/7/2025     3:20 PM   Additional Questions   Roomed by NAHID Gallegos   Accompanied by Self     History of Present Illness       Reason for visit:  Finger numbness elbow pain under weight   He is taking medications regularly.                  Review of Systems  Patient visit done for primary reason of left elbow concern.    Patient feels left olecranon area elbow pain towards end of active flexion, especially against resistance. Has had symptoms for a month.     Patient has diminished sensation " "throughout left 4th and 5th digits for past 12 days.      No injuries to areas of concern.     Warm packs initially helped elbow pain.         Objective    BP (!) 130/91 (BP Location: Left arm, Patient Position: Sitting, Cuff Size: Adult Large)   Pulse 90   Temp 99.2  F (37.3  C) (Oral)   Ht 1.892 m (6' 2.5\")   Wt (!) 141.1 kg (311 lb)   SpO2 95%   BMI 39.40 kg/m    Body mass index is 39.4 kg/m .  Physical Exam   Vital signs reviewed.  Patient is in no acute appearing distress.  Breathing appears nonlabored.  Patient is alert and oriented ×3.  Patient is pleasant, making good eye contact and responding with clear fluent speech.    Left upper extremity exam: No left elbow, forearm, or finger tenderness.     No strength deficits throughout left upper extremity with strength testing against resistance with flexion and extension at elbow, wrist pronation and supination of forearm, and with abduction and adduction, and flexion and extension of fingers.     Patient has 4th and 5th finger discomfort with flexion against resistance.     Patient has left proximal olecranon discomfort with the end of passive flexion, and with supination against resistance.             Signed Electronically by: Dorian Martínez DO    Answers submitted by the patient for this visit:  General Questionnaire (Submitted on 3/7/2025)  Chief Complaint: Chronic problems general questions HPI Form  What is the reason for your visit today? : finger numbness elbow pain under weight  How many days per week do you miss taking your medication?: 0  Questionnaire about: Chronic problems general questions HPI Form (Submitted on 3/7/2025)  Chief Complaint: Chronic problems general questions HPI Form    "

## 2025-03-07 NOTE — LETTER
My Asthma Action Plan    Name: Yakov Cantrell   YOB: 1990  Date: 3/7/2025   My doctor: Dorian Martínez, DO   My clinic: [facility]        My Rescue Medicine:   Albuterol inhaler (Proair/Ventolin/Proventil HFA)  2 puffs EVERY 4 HOURS as needed. Use a spacer if recommended by your provider.   My Asthma Severity:   Intermittent / Exercise Induced  Know your asthma triggers: pollens and cold air  pollens  allergies          GREEN ZONE   Good Control  I feel good  No cough or wheeze  Can work, sleep and play without asthma symptoms       Take your asthma control medicine every day.     If exercise triggers your asthma, take your rescue medication  15 minutes before exercise or sports, and  During exercise if you have asthma symptoms  Spacer to use with inhaler: If you have a spacer, make sure to use it with your inhaler             YELLOW ZONE Getting Worse  I have ANY of these:  I do not feel good  Cough or wheeze  Chest feels tight  Wake up at night   Keep taking your Green Zone medications  Start taking your rescue medicine:  every 20 minutes for up to 1 hour. Then every 4 hours for 24-48 hours.  If you stay in the Yellow Zone for more than 12-24 hours, contact your doctor.  If you do not return to the Green Zone in 12-24 hours or you get worse, start taking your oral steroid medicine if prescribed by your provider.           RED ZONE Medical Alert - Get Help  I have ANY of these:  I feel awful  Medicine is not helping  Breathing getting harder  Trouble walking or talking  Nose opens wide to breathe       Take your rescue medicine NOW  If your provider has prescribed an oral steroid medicine, start taking it NOW  Call your doctor NOW  If you are still in the Red Zone after 20 minutes and you have not reached your doctor:  Take your rescue medicine again and  Call 911 or go to the emergency room right away    See your regular doctor within 2 weeks of an Emergency Room or Urgent Care  visit for follow-up treatment.          Annual Reminders:  Meet with Asthma Educator,  Flu Shot in the Fall, consider Pneumonia Vaccination for patients with asthma (aged 19 and older).    Pharmacy: [id number] [id number]    Electronically signed by Dorian Martínez DO   Date: 03/07/25                    Asthma Triggers  How To Control Things That Make Your Asthma Worse    Triggers are things that make your asthma worse.  Look at the list below to help you find your triggers and   what you can do about them. You can help prevent asthma flare-ups by staying away from your triggers.      Trigger                                                          What you can do   Cigarette Smoke  Tobacco smoke can make asthma worse. Do not allow smoking in your home, car or around you.  Be sure no one smokes at a child s day care or school.  If you smoke, ask your health care provider for ways to help you quit.  Ask family members to quit too.  Ask your health care provider for a referral to Quit Plan to help you quit smoking, or call 0-584-838-PLAN.     Colds, Flu, Bronchitis  These are common triggers of asthma. Wash your hands often.  Don t touch your eyes, nose or mouth.  Get a flu shot every year.     Dust Mites  These are tiny bugs that live in cloth or carpet. They are too small to see. Wash sheets and blankets in hot water every week.   Encase pillows and mattress in dust mite proof covers.  Avoid having carpet if you can. If you have carpet, vacuum weekly.   Use a dust mask and HEPA vacuum.   Pollen and Outdoor Mold  Some people are allergic to trees, grass, or weed pollen, or molds. Try to keep your windows closed.  Limit time out doors when pollen count is high.   Ask you health care provider about taking medicine during allergy season.     Animal Dander  Some people are allergic to skin flakes, urine or saliva from pets with fur or feathers. Keep pets with fur or feathers out of your  home.    If you can t keep the pet outdoors, then keep the pet out of your bedroom.  Keep the bedroom door closed.  Keep pets off cloth furniture and away from stuffed toys.     Mice, Rats, and Cockroaches  Some people are allergic to the waste from these pests.   Cover food and garbage.  Clean up spills and food crumbs.  Store grease in the refrigerator.   Keep food out of the bedroom.   Indoor Mold  This can be a trigger if your home has high moisture. Fix leaking faucets, pipes, or other sources of water.   Clean moldy surfaces.  Dehumidify basement if it is damp and smelly.   Smoke, Strong Odors, and Sprays  These can reduce air quality. Stay away from strong odors and sprays, such as perfume, powder, hair spray, paints, smoke incense, paint, cleaning products, candles and new carpet.   Exercise or Sports  Some people with asthma have this trigger. Be active!  Ask your doctor about taking medicine before sports or exercise to prevent symptoms.    Warm up for 5-10 minutes before and after sports or exercise.     Other Triggers of Asthma  Cold air:  Cover your nose and mouth with a scarf.  Sometimes laughing or crying can be a trigger.  Some medicines and food can trigger asthma.

## 2025-03-15 ENCOUNTER — OFFICE VISIT (OUTPATIENT)
Dept: ORTHOPEDICS | Facility: CLINIC | Age: 35
End: 2025-03-15
Attending: FAMILY MEDICINE
Payer: COMMERCIAL

## 2025-03-15 DIAGNOSIS — M25.522 LEFT ELBOW PAIN: ICD-10-CM

## 2025-03-15 DIAGNOSIS — G56.22 ULNAR NEURITIS, LEFT: ICD-10-CM

## 2025-03-15 DIAGNOSIS — M77.8 TENDINITIS OF LEFT TRICEPS: ICD-10-CM

## 2025-03-15 DIAGNOSIS — M77.12 LATERAL EPICONDYLITIS OF LEFT ELBOW: ICD-10-CM

## 2025-03-15 DIAGNOSIS — M77.02 MEDIAL EPICONDYLITIS OF LEFT ELBOW: Primary | ICD-10-CM

## 2025-03-15 PROCEDURE — G2211 COMPLEX E/M VISIT ADD ON: HCPCS | Performed by: FAMILY MEDICINE

## 2025-03-15 PROCEDURE — 99204 OFFICE O/P NEW MOD 45 MIN: CPT | Performed by: FAMILY MEDICINE

## 2025-03-15 RX ORDER — DICLOFENAC SODIUM 75 MG/1
75 TABLET, DELAYED RELEASE ORAL 2 TIMES DAILY PRN
Qty: 60 TABLET | Refills: 0 | Status: SHIPPED | OUTPATIENT
Start: 2025-03-15

## 2025-03-15 NOTE — LETTER
3/15/2025      Yakov Cantrell  [address]  [place] [address]      Dear Colleague,    Thank you for referring your patient, Yakov Cantrell, to the [facility] [place]. Please see a copy of my visit note below.    ASSESSMENT & PLAN  Patient Instructions     1. Medial epicondylitis of left elbow    2. Left elbow pain    3. Lateral epicondylitis of left elbow    4. Tendinitis of left triceps    5. Ulnar neuritis, left      - Patient has acute left elbow pain due to tendinitis and ulnar nerve irritation  -Patient will start diclofenac 75 mg twice a day as needed.  Patient may also take Tylenol as needed for breakthrough pain.  Patient will stop using ibuprofen or Advil  -Patient will start formal elbow therapy and home exercise program.  -Patient will try avoiding direct pressure on the elbow and excessive flexion to avoid irritation of the ulnar nerve  -Patient will continue to follow-up with me for further treatment and recommendations  -Call direct clinic number [[phone]] at any time with questions or concerns.    Albert Yeo MD [facility]  [facility]        -----    SUBJECTIVE  Yakov Cantrell is a/an 34 year old Right handed male who is seen in consultation at the request of  Dorian Martínez D.O. for evaluation of left elbow pain. The patient is seen by themselves.    Onset: 3-4 week(s) ago. Reports insidious onset without acute precipitating event. 2 weeks ago he noticed numbness in his left 5th finger - has become constant  Location of Pain: left posterior elbow, occasional pain in left lateral elbow  Rating of Pain at worst: 5/10  Rating of Pain Currently: 5/10  Worsened by: pulling, lifting, pain increases with use  Better with: nothing  Treatments tried: rest/activity avoidance, ice, and heat  Associated symptoms: numbness and decreased sensitivity  Orthopedic history: NO  Relevant surgical history:  NO  Social history: social history: works - office job    Past Medical History:   Diagnosis Date     Cannabis dependence, episodic use (H) 09/06/2022     CARDIOVASCULAR SCREENING; LDL GOAL LESS THAN 160 06/19/2014     Cirrhosis (H)     Skin, Hands     Mild persistent asthma, unspecified whether complicated 06/19/2018     Social History     Socioeconomic History     Marital status:    Tobacco Use     Smoking status: Former     Current packs/day: 0.00     Types: Cigarettes, Other     Smokeless tobacco: Never     Tobacco comments:     bum cigs socially    Vaping Use     Vaping status: Never Used   Substance and Sexual Activity     Alcohol use: Yes     Alcohol/week: 0.0 standard drinks of alcohol     Comment: rarely, 1-2 a month     Drug use: Yes     Types: Marijuana     Comment: rarely     Sexual activity: Yes     Partners: Female   Other Topics Concern     Parent/sibling w/ CABG, MI or angioplasty before 65F 55M? No     Social Drivers of Health     Financial Resource Strain: Not on File (1/23/2024)    Received from [facility]    Financial Resource Strain      Financial Resource Strain: 0   Food Insecurity: Not on File (9/26/2024)    Received from [facility]    Food Insecurity      Food: 0   Transportation Needs: Not on File (1/23/2024)    Received from [facility]    Transportation Needs      Transportation: 0   Physical Activity: Not on File (1/23/2024)    Received from [facility]    Physical Activity      Physical Activity: 0   Stress: Not on File (1/23/2024)    Received from [facility]    Stress      Stress: 0   Social Connections: Not on File (9/15/2024)    Received from [facility]    Social Connections      Connectedness: 0   Interpersonal Safety: Low Risk  (3/7/2025)    Interpersonal Safety      Do you feel physically and emotionally safe where you currently live?: Yes      Within the past 12 months, have you been hit, slapped, kicked or otherwise physically hurt by someone?: No      Within the past 12  months, have you been humiliated or emotionally abused in other ways by your partner or ex-partner?: No   Housing Stability: Not on File (2024)    Received from [facility]    Housing Stability      Housin       Patient's past medical, surgical, social, and family histories were reviewed today and no changes are noted.    REVIEW OF SYSTEMS:  10 point ROS is negative other than symptoms noted above in HPI, Past Medical History or as stated below  Constitutional: NEGATIVE for fever, chills, change in weight  Skin: NEGATIVE for worrisome rashes, moles or lesions  GI/: NEGATIVE for bowel or bladder changes  Neuro: NEGATIVE for weakness, dizziness or paresthesias    OBJECTIVE:  There were no vitals taken for this visit.   General: healthy, alert and in no distress  HEENT: no scleral icterus or conjunctival erythema  Skin: no suspicious lesions or rash. No jaundice.  CV: regular rhythm by palpation  Resp: normal respiratory effort without conversational dyspnea   Psych: normal mood and affect  Gait: normal steady gait with appropriate coordination and balance  Neuro: Normal sensory exam of bilateral hands.   MSK:  LEFT ELBOW  Inspection:    No swelling, bruising, discoloration, or obvious deformity or asymmetry  Palpation:    Tender about the lateral epicondyle, common extensor tendon, medial epicondyle, common flexor tendon, extensor muscle of forearm, tricep insertions. Remainder of bony, ligamentous and tendinous landmarks are nontender.    Crepitus is Absent  Range of Motion:     Extension full / flexion full / pronation full / supination full  Strength:    extension limited slightly by pain pronation limited slightly by pain supination limited slightly by pain  Special Tests:    Positive: Pain with resisted wrist extension, pain with resisted middle finger extension, pain with resisted wrist flexion, pain with resisted supination, pain with resisted pronation       Independent visualization of the below  image:  No results found for this or any previous visit (from the past 24 hours).        Albert Yeo MD [facility]      Again, thank you for allowing me to participate in the care of your patient.        Sincerely,        Albert Yeo, MD    Electronically signed

## 2025-03-15 NOTE — PATIENT INSTRUCTIONS
1. Medial epicondylitis of left elbow    2. Left elbow pain    3. Lateral epicondylitis of left elbow    4. Tendinitis of left triceps    5. Ulnar neuritis, left      - Patient has acute left elbow pain due to tendinitis and ulnar nerve irritation  -Patient will start diclofenac 75 mg twice a day as needed.  Patient may also take Tylenol as needed for breakthrough pain.  Patient will stop using ibuprofen or Advil  -Patient will start formal elbow therapy and home exercise program.  -Patient will try avoiding direct pressure on the elbow and excessive flexion to avoid irritation of the ulnar nerve  -Patient will continue to follow-up with me for further treatment and recommendations  -Call direct clinic number [[phone]] at any time with questions or concerns.    Albert Yeo MD [facility]  [facility]

## 2025-03-15 NOTE — PROGRESS NOTES
ASSESSMENT & PLAN  Patient Instructions     1. Medial epicondylitis of left elbow    2. Left elbow pain    3. Lateral epicondylitis of left elbow    4. Tendinitis of left triceps    5. Ulnar neuritis, left      - Patient has acute left elbow pain due to tendinitis and ulnar nerve irritation  -Patient will start diclofenac 75 mg twice a day as needed.  Patient may also take Tylenol as needed for breakthrough pain.  Patient will stop using ibuprofen or Advil  -Patient will start formal elbow therapy and home exercise program.  -Patient will try avoiding direct pressure on the elbow and excessive flexion to avoid irritation of the ulnar nerve  -Patient will continue to follow-up with me for further treatment and recommendations  -Call direct clinic number [[phone]] at any time with questions or concerns.    Albert Yeo MD [facility]  [facility]        -----    SUBJECTIVE  Yakov Cantrell is a/an 34 year old Right handed male who is seen in consultation at the request of  Dorian Martínez D.O. for evaluation of left elbow pain. The patient is seen by themselves.    Onset: 3-4 week(s) ago. Reports insidious onset without acute precipitating event. 2 weeks ago he noticed numbness in his left 5th finger - has become constant  Location of Pain: left posterior elbow, occasional pain in left lateral elbow  Rating of Pain at worst: 5/10  Rating of Pain Currently: 5/10  Worsened by: pulling, lifting, pain increases with use  Better with: nothing  Treatments tried: rest/activity avoidance, ice, and heat  Associated symptoms: numbness and decreased sensitivity  Orthopedic history: NO  Relevant surgical history: NO  Social history: social history: works - office job    Past Medical History:   Diagnosis Date    Cannabis dependence, episodic use (H) 09/06/2022    CARDIOVASCULAR SCREENING; LDL GOAL LESS THAN 160 06/19/2014    Cirrhosis (H)     Skin, Hands    Mild persistent  asthma, unspecified whether complicated 2018     Social History     Socioeconomic History    Marital status:    Tobacco Use    Smoking status: Former     Current packs/day: 0.00     Types: Cigarettes, Other    Smokeless tobacco: Never    Tobacco comments:     bum cigs socially    Vaping Use    Vaping status: Never Used   Substance and Sexual Activity    Alcohol use: Yes     Alcohol/week: 0.0 standard drinks of alcohol     Comment: rarely, 1-2 a month    Drug use: Yes     Types: Marijuana     Comment: rarely    Sexual activity: Yes     Partners: Female   Other Topics Concern    Parent/sibling w/ CABG, MI or angioplasty before 65F 55M? No     Social Drivers of Health     Financial Resource Strain: Not on File (2024)    Received from [facility]    Financial Resource Strain     Financial Resource Strain: 0   Food Insecurity: Not on File (2024)    Received from [facility]    Food Insecurity     Food: 0   Transportation Needs: Not on File (2024)    Received from [facility]    Transportation Needs     Transportation: 0   Physical Activity: Not on File (2024)    Received from [facility]    Physical Activity     Physical Activity: 0   Stress: Not on File (2024)    Received from [facility]    Stress     Stress: 0   Social Connections: Not on File (9/15/2024)    Received from [facility]    Social Connections     Connectedness: 0   Interpersonal Safety: Low Risk  (3/7/2025)    Interpersonal Safety     Do you feel physically and emotionally safe where you currently live?: Yes     Within the past 12 months, have you been hit, slapped, kicked or otherwise physically hurt by someone?: No     Within the past 12 months, have you been humiliated or emotionally abused in other ways by your partner or ex-partner?: No   Housing Stability: Not on File (2024)    Received from [facility]    Housing Stability     Housin       Patient's past medical, surgical, social, and family histories were  reviewed today and no changes are noted.    REVIEW OF SYSTEMS:  10 point ROS is negative other than symptoms noted above in HPI, Past Medical History or as stated below  Constitutional: NEGATIVE for fever, chills, change in weight  Skin: NEGATIVE for worrisome rashes, moles or lesions  GI/: NEGATIVE for bowel or bladder changes  Neuro: NEGATIVE for weakness, dizziness or paresthesias    OBJECTIVE:  There were no vitals taken for this visit.   General: healthy, alert and in no distress  HEENT: no scleral icterus or conjunctival erythema  Skin: no suspicious lesions or rash. No jaundice.  CV: regular rhythm by palpation  Resp: normal respiratory effort without conversational dyspnea   Psych: normal mood and affect  Gait: normal steady gait with appropriate coordination and balance  Neuro: Normal sensory exam of bilateral hands.   MSK:  LEFT ELBOW  Inspection:    No swelling, bruising, discoloration, or obvious deformity or asymmetry  Palpation:    Tender about the lateral epicondyle, common extensor tendon, medial epicondyle, common flexor tendon, extensor muscle of forearm, tricep insertions. Remainder of bony, ligamentous and tendinous landmarks are nontender.    Crepitus is Absent  Range of Motion:     Extension full / flexion full / pronation full / supination full  Strength:    extension limited slightly by pain pronation limited slightly by pain supination limited slightly by pain  Special Tests:    Positive: Pain with resisted wrist extension, pain with resisted middle finger extension, pain with resisted wrist flexion, pain with resisted supination, pain with resisted pronation       Independent visualization of the below image:  No results found for this or any previous visit (from the past 24 hours).        Albert Yeo MD [facility]

## 2025-04-07 ENCOUNTER — THERAPY VISIT (OUTPATIENT)
Dept: OCCUPATIONAL THERAPY | Facility: CLINIC | Age: 35
End: 2025-04-07
Attending: FAMILY MEDICINE
Payer: COMMERCIAL

## 2025-04-07 DIAGNOSIS — M77.12 LATERAL EPICONDYLITIS OF LEFT ELBOW: ICD-10-CM

## 2025-04-07 DIAGNOSIS — M25.522 LEFT ELBOW PAIN: Primary | ICD-10-CM

## 2025-04-07 DIAGNOSIS — M77.8 TENDINITIS OF LEFT TRICEPS: ICD-10-CM

## 2025-04-07 DIAGNOSIS — M77.02 MEDIAL EPICONDYLITIS OF LEFT ELBOW: ICD-10-CM

## 2025-04-07 DIAGNOSIS — G56.22 ULNAR NEURITIS, LEFT: ICD-10-CM

## 2025-04-07 PROCEDURE — 97165 OT EVAL LOW COMPLEX 30 MIN: CPT | Mod: GO | Performed by: OCCUPATIONAL THERAPIST

## 2025-04-07 PROCEDURE — 97530 THERAPEUTIC ACTIVITIES: CPT | Mod: GO | Performed by: OCCUPATIONAL THERAPIST

## 2025-04-07 PROCEDURE — 97112 NEUROMUSCULAR REEDUCATION: CPT | Mod: GO | Performed by: OCCUPATIONAL THERAPIST

## 2025-04-07 NOTE — PROGRESS NOTES
OCCUPATIONAL THERAPY EVALUATION  Type of Visit: Evaluation       Fall Risk Screen:  Fall screen completed by: OT  Have you fallen 2 or more times in the past year?: No  Have you fallen and had an injury in the past year?: No  Is patient a fall risk?: No    Subjective        Presenting condition or subjective complaint: finger numbness, elbow pain  Date of onset: 03/15/25 (order date)    Relevant medical history: Asthma   Dates & types of surgery: none    Prior diagnostic imaging/testing results: Other no   Prior therapy history for the same diagnosis, illness or injury: No      Prior Level of Function  Transfers:   Ambulation:   ADL:   IADL:     Living Environment  Social support: With a significant other or spouse   Type of home: House   Stairs to enter the home: No       Ramp: No   Stairs inside the home: Yes 14 Is there a railing: Yes     Help at home: None  Equipment owned:       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[employer]  Hobbies/Interests: mart, [employer] golf, board games    Patient goals for therapy: bring back senses to my fingers and relieve elbow pain    Pain assessment:      Objective   ADDITIONAL HISTORY:  Ambidextrous - writing R hand, throwing L hand  Patient reports symptoms of pain, weakness/loss of strength, numbness, and tingling   Transportation: drives  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    Frequency constant     Pain is worst  daytime    Exacerbated by  Unknown, possibly desk work. Elbow extension, lifting, gripping/pulling, opening a bag of chips   Relieved by Anti-inflammatory drug   Progression Unchanged     Posture  Rounded Forward Shoulders    SCREENS:   Cervical Screen  Active with Gentle Overpressure (Pain Report)    Flexion -   Extension -   Lateral Flexion Right -   Lateral Flexion Left -   Rotation Right -   Rotation Left -   Compression NT   Traction NT     Edema  Mild over hypothenars    Sensation  Decreased Ulnar Nerve distribution per pt report    Appearance  Observation:  - none  + mild    ++ moderate    +++ severe    4/7/2025   Clawing + over SF   Hypothenar Atrophy -   Intrinsic Atrophy -     Special Tests  Pain Report: - none  + mild    ++ moderate    +++ severe    4/7/2025   SUDHEER/EAST Test + L side, more difficult after 15+ seconds   Tinels at cubital tunnel + tingling   Tinel's at guyon[place] canal +   Elbow Flexion Test +   Ulnar nerve subluxation at elbow -   Froment's Test +     ROM  WFL per visual observation    Strength   (Measured in pounds)  Pain Report: - none  + mild    ++ moderate    +++ severe    4/7/2025 4/7/2025   Trials R L   1   91 74 -/+ in triceps   Average 91 74 -/+       4/7/2025 4/7/2025    R L   Elbow Ext 69 60 + elbow     Lat Pinch 4/7/2025 4/7/2025   Trials R L   1   18 9   Average 18 9     3 Pt Pinch 4/7/2025 4/7/2025   Trials R L   1   15 8   Average 15 8     Assessment & Plan   CLINICAL IMPRESSIONS  Medical Diagnosis: 1. Medial epicondylitis of left elbow   2. Left elbow pain   3. Lateral epicondylitis of left elbow   4. Tendinitis of left triceps   5. Ulnar neuritis, left    Treatment Diagnosis: L elbow pain    Impression/Assessment: Pt is a 34 year old male presenting to Occupational Therapy due to gradual onset.  The following significant findings have been identified: Impaired activity tolerance, Impaired coordination, Impaired ROM, Impaired sensation, Impaired strength, and Pain.  These  identified deficits interfere with their ability to perform self care tasks, work tasks, recreational activities, household chores, driving , and meal planning and preparation as compared to previous level of function.     Clinical Decision Making (Complexity):  Assessment of Occupational Performance: 5 or more Performance Deficits  Occupational Performance Limitations: bathing/showering, toileting, dressing, feeding, functional mobility, driving and community mobility, health management and maintenance, home establishment and management, meal preparation and cleanup, shopping, sleep, work, and leisure activities  Clinical Decision Making (Complexity): Low complexity    PLAN OF CARE  Treatment Interventions:  Modalities:  Hot Packs  Therapeutic Exercise:  AROM, AAROM, PROM, Tendon Gliding, Blocking, Isotonics, Isometrics and Stabilization  Neuromuscular re-education:  Nerve Gliding, Coordination/Dexterity, Sensory re-education, Desensitization, Proprioceptive Training, Kinesiotaping, Strain Counter Strain  Manual Techniques:  Joint mobilization, Scar mobilization, Friction massage, Myofascial release, and Manual edema mobilization  Orthotic Fabrication:  Static and Long arm  Self Care:  Self Care Tasks, Ergonomic Considerations, and Work Tasks    Long Term Goals   OT Goal 2  Goal Identifier: Household IADLs - carrying a shopping bag  Goal Description: No difficulty (4 UEFI score) carrying a shopping bag equal to or greater than 10lb  Rationale: In order to maximize safety and independence with performance of self-care activities  Goal Progress: Goal initiated - 1 UEFI score  Target Date: 06/02/25      Frequency of Treatment: 1x/week  Duration of Treatment: 4 weeks, tapering to biweekly for 4 weeks     Recommended Referrals to Other Professionals:   Education Assessment: Learner/Method: Patient;Demonstration  Education Comments: PTRX on phone     Risks and benefits of evaluation/treatment have been explained.    Patient/Family/caregiver agrees with Plan of Care.     Evaluation Time:    OT Eval, Low Complexity Minutes (39664): 20       Signing Clinician: Kayy Granados OT

## 2025-04-14 ENCOUNTER — MYC REFILL (OUTPATIENT)
Dept: NEPHROLOGY | Facility: CLINIC | Age: 35
End: 2025-04-14
Payer: COMMERCIAL

## 2025-04-14 ENCOUNTER — MYC REFILL (OUTPATIENT)
Dept: FAMILY MEDICINE | Facility: CLINIC | Age: 35
End: 2025-04-14
Payer: COMMERCIAL

## 2025-04-14 DIAGNOSIS — I10 BENIGN ESSENTIAL HYPERTENSION: ICD-10-CM

## 2025-04-14 DIAGNOSIS — I10 PRIMARY HYPERTENSION: ICD-10-CM

## 2025-04-14 DIAGNOSIS — M77.02 MEDIAL EPICONDYLITIS OF LEFT ELBOW: ICD-10-CM

## 2025-04-14 DIAGNOSIS — M77.12 LATERAL EPICONDYLITIS OF LEFT ELBOW: ICD-10-CM

## 2025-04-14 RX ORDER — HYDROCHLOROTHIAZIDE 25 MG/1
25 TABLET ORAL DAILY
Qty: 90 TABLET | Refills: 0 | OUTPATIENT
Start: 2025-04-14

## 2025-04-14 RX ORDER — LISINOPRIL 20 MG/1
20 TABLET ORAL DAILY
Qty: 90 TABLET | Refills: 3 | Status: SHIPPED | OUTPATIENT
Start: 2025-04-14

## 2025-04-14 NOTE — TELEPHONE ENCOUNTER
Medication Request for: Diclofenac 75 mg            Prescription last written on 3/15/25 by Dr. Yeo   Sig: take 1 tab twice daily prn    Last Fill Quantity: 60 with # refills: 0     Last Office Visit by provider: 3/15/25    [facility] message sent to patient.     LEONID Plunkett RN

## 2025-04-14 NOTE — LETTER
April 23, 2025      Yakov Cantrell  [address]  [facility] [address]        Dear Yakov,     We have made 3 attempts to contact you regarding a refill request for Diclofenac 75 mg.  Please contact the clinic for further information or schedule an appointment to further discuss if you are needing a refill.     Sincerely,        Albert Yeo, MD

## 2025-04-21 NOTE — TELEPHONE ENCOUNTER
Patient did not respond to our DCITS message but read it.     There is a consent to communicate on file.     Left voicemail asking for a return call regarding the refill request or to respond to the DCITS message.     LEONID Plunkett RN

## 2025-04-23 RX ORDER — DICLOFENAC SODIUM 75 MG/1
TABLET, DELAYED RELEASE ORAL
Qty: 60 TABLET | Refills: 0 | OUTPATIENT
Start: 2025-04-23

## 2025-05-06 ENCOUNTER — MYC REFILL (OUTPATIENT)
Dept: FAMILY MEDICINE | Facility: CLINIC | Age: 35
End: 2025-05-06
Payer: COMMERCIAL

## 2025-05-06 DIAGNOSIS — I10 BENIGN ESSENTIAL HYPERTENSION: ICD-10-CM

## 2025-05-06 RX ORDER — HYDROCHLOROTHIAZIDE 25 MG/1
25 TABLET ORAL DAILY
Qty: 90 TABLET | Refills: 0 | Status: SHIPPED | OUTPATIENT
Start: 2025-05-06

## 2025-05-07 ENCOUNTER — MYC REFILL (OUTPATIENT)
Dept: FAMILY MEDICINE | Facility: CLINIC | Age: 35
End: 2025-05-07
Payer: COMMERCIAL

## 2025-05-07 DIAGNOSIS — F41.9 ANXIETY: ICD-10-CM

## 2025-05-07 DIAGNOSIS — F41.0 PANIC ATTACKS: ICD-10-CM

## 2025-05-07 RX ORDER — CITALOPRAM HYDROBROMIDE 20 MG/1
20 TABLET ORAL DAILY
Qty: 90 TABLET | Refills: 3 | OUTPATIENT
Start: 2025-05-07

## 2025-05-08 ENCOUNTER — THERAPY VISIT (OUTPATIENT)
Dept: OCCUPATIONAL THERAPY | Facility: CLINIC | Age: 35
End: 2025-05-08
Payer: COMMERCIAL

## 2025-05-08 DIAGNOSIS — G56.22 ULNAR NEURITIS, LEFT: ICD-10-CM

## 2025-05-08 DIAGNOSIS — M77.12 LATERAL EPICONDYLITIS OF LEFT ELBOW: ICD-10-CM

## 2025-05-08 DIAGNOSIS — M77.8 TENDINITIS OF LEFT TRICEPS: ICD-10-CM

## 2025-05-08 DIAGNOSIS — M25.522 LEFT ELBOW PAIN: Primary | ICD-10-CM

## 2025-05-08 DIAGNOSIS — M77.02 MEDIAL EPICONDYLITIS OF LEFT ELBOW: ICD-10-CM

## 2025-05-16 ENCOUNTER — APPOINTMENT (OUTPATIENT)
Dept: GENERAL RADIOLOGY | Facility: CLINIC | Age: 35
End: 2025-05-16
Attending: SOCIAL WORKER
Payer: COMMERCIAL

## 2025-05-16 ENCOUNTER — HOSPITAL ENCOUNTER (EMERGENCY)
Facility: CLINIC | Age: 35
Discharge: HOME | End: 2025-05-16
Attending: SOCIAL WORKER | Admitting: SOCIAL WORKER
Payer: COMMERCIAL

## 2025-05-16 VITALS
RESPIRATION RATE: 18 BRPM | SYSTOLIC BLOOD PRESSURE: 159 MMHG | TEMPERATURE: 98.7 F | HEART RATE: 85 BPM | BODY MASS INDEX: 39.17 KG/M2 | WEIGHT: 315 LBS | DIASTOLIC BLOOD PRESSURE: 93 MMHG | HEIGHT: 75 IN | OXYGEN SATURATION: 99 %

## 2025-05-16 DIAGNOSIS — R07.89 ATYPICAL CHEST PAIN: ICD-10-CM

## 2025-05-16 LAB
ANION GAP SERPL CALCULATED.3IONS-SCNC: 12 MMOL/L (ref 7–15)
ATRIAL RATE - MUSE: 67 BPM
BASOPHILS # BLD AUTO: 0.1 10E3/UL (ref 0–0.2)
BASOPHILS NFR BLD AUTO: 1 %
BUN SERPL-MCNC: 11.1 MG/DL (ref 6–20)
CALCIUM SERPL-MCNC: 9.1 MG/DL (ref 8.8–10.4)
CHLORIDE SERPL-SCNC: 99 MMOL/L (ref 98–107)
CREAT SERPL-MCNC: 0.61 MG/DL (ref 0.67–1.17)
DIASTOLIC BLOOD PRESSURE - MUSE: NORMAL MMHG
EGFRCR SERPLBLD CKD-EPI 2021: >90 ML/MIN/1.73M2
EOSINOPHIL # BLD AUTO: 0.5 10E3/UL (ref 0–0.7)
EOSINOPHIL NFR BLD AUTO: 6 %
ERYTHROCYTE [DISTWIDTH] IN BLOOD BY AUTOMATED COUNT: 12.8 % (ref 10–15)
GLUCOSE SERPL-MCNC: 124 MG/DL (ref 70–99)
HCO3 SERPL-SCNC: 24 MMOL/L (ref 22–29)
HCT VFR BLD AUTO: 42.2 % (ref 40–53)
HGB BLD-MCNC: 14.1 G/DL (ref 13.3–17.7)
HOLD SPECIMEN: NORMAL
IMM GRANULOCYTES # BLD: 0.1 10E3/UL
IMM GRANULOCYTES NFR BLD: 1 %
INTERPRETATION ECG - MUSE: NORMAL
LIPASE SERPL-CCNC: 36 U/L (ref 13–60)
LYMPHOCYTES # BLD AUTO: 2.1 10E3/UL (ref 0.8–5.3)
LYMPHOCYTES NFR BLD AUTO: 23 %
MCH RBC QN AUTO: 27.7 PG (ref 26.5–33)
MCHC RBC AUTO-ENTMCNC: 33.4 G/DL (ref 31.5–36.5)
MCV RBC AUTO: 83 FL (ref 78–100)
MONOCYTES # BLD AUTO: 0.9 10E3/UL (ref 0–1.3)
MONOCYTES NFR BLD AUTO: 9 %
NEUTROPHILS # BLD AUTO: 5.4 10E3/UL (ref 1.6–8.3)
NEUTROPHILS NFR BLD AUTO: 60 %
NRBC # BLD AUTO: 0 10E3/UL
NRBC BLD AUTO-RTO: 0 /100
P AXIS - MUSE: 48 DEGREES
PLATELET # BLD AUTO: 339 10E3/UL (ref 150–450)
POTASSIUM SERPL-SCNC: 3.9 MMOL/L (ref 3.4–5.3)
PR INTERVAL - MUSE: 138 MS
QRS DURATION - MUSE: 98 MS
QT - MUSE: 414 MS
QTC - MUSE: 437 MS
R AXIS - MUSE: 17 DEGREES
RBC # BLD AUTO: 5.09 10E6/UL (ref 4.4–5.9)
SODIUM SERPL-SCNC: 135 MMOL/L (ref 135–145)
SYSTOLIC BLOOD PRESSURE - MUSE: NORMAL MMHG
T AXIS - MUSE: 29 DEGREES
TROPONIN T SERPL HS-MCNC: <6 NG/L
VENTRICULAR RATE- MUSE: 67 BPM
WBC # BLD AUTO: 9 10E3/UL (ref 4–11)

## 2025-05-16 PROCEDURE — 71046 X-RAY EXAM CHEST 2 VIEWS: CPT

## 2025-05-16 PROCEDURE — 93005 ELECTROCARDIOGRAM TRACING: CPT | Performed by: SOCIAL WORKER

## 2025-05-16 PROCEDURE — 250N000013 HC RX MED GY IP 250 OP 250 PS 637: Performed by: SOCIAL WORKER

## 2025-05-16 PROCEDURE — 83690 ASSAY OF LIPASE: CPT | Performed by: SOCIAL WORKER

## 2025-05-16 PROCEDURE — 85025 COMPLETE CBC W/AUTO DIFF WBC: CPT | Performed by: SOCIAL WORKER

## 2025-05-16 PROCEDURE — 80048 BASIC METABOLIC PNL TOTAL CA: CPT | Performed by: SOCIAL WORKER

## 2025-05-16 PROCEDURE — 99285 EMERGENCY DEPT VISIT HI MDM: CPT | Mod: 25 | Performed by: SOCIAL WORKER

## 2025-05-16 PROCEDURE — 36415 COLL VENOUS BLD VENIPUNCTURE: CPT | Performed by: SOCIAL WORKER

## 2025-05-16 PROCEDURE — 84484 ASSAY OF TROPONIN QUANT: CPT | Performed by: SOCIAL WORKER

## 2025-05-16 RX ORDER — MAGNESIUM HYDROXIDE/ALUMINUM HYDROXICE/SIMETHICONE 120; 1200; 1200 MG/30ML; MG/30ML; MG/30ML
15 SUSPENSION ORAL ONCE
Status: COMPLETED | OUTPATIENT
Start: 2025-05-16 | End: 2025-05-16

## 2025-05-16 RX ADMIN — ALUMINUM HYDROXIDE, MAGNESIUM HYDROXIDE, AND SIMETHICONE 15 ML: 200; 200; 20 SUSPENSION ORAL at 10:25

## 2025-05-16 ASSESSMENT — ACTIVITIES OF DAILY LIVING (ADL)
ADLS_ACUITY_SCORE: 41

## 2025-05-16 ASSESSMENT — COLUMBIA-SUICIDE SEVERITY RATING SCALE - C-SSRS
2. HAVE YOU ACTUALLY HAD ANY THOUGHTS OF KILLING YOURSELF IN THE PAST MONTH?: NO
6. HAVE YOU EVER DONE ANYTHING, STARTED TO DO ANYTHING, OR PREPARED TO DO ANYTHING TO END YOUR LIFE?: NO
1. IN THE PAST MONTH, HAVE YOU WISHED YOU WERE DEAD OR WISHED YOU COULD GO TO SLEEP AND NOT WAKE UP?: NO

## 2025-05-16 NOTE — DISCHARGE INSTRUCTIONS
You were seen in the emergency department for chest pain. Your exam was reassuring, we did not see signs of an acute emergency at this time. Your labs and imaging were also reassuring overall.     Please call your primary care provider today to schedule a follow up appointment next week. Try taking a course of Omeprazole or Pantoprazole for 14 days.     If you have return of chest pain, if you notice the chest pain comes on with walking, lifting heavy items, or otherwise exerting yourself, if you have sweating and vomiting with the pain, if you have difficulty breathing, if you cough up blood, if you faint, or any other concerning symptoms, please come back to the emergency department.

## 2025-05-16 NOTE — ED TRIAGE NOTES
Pt was woken up in the middle of the night with tight chest describing squeezing mid sternal last 5-10 minutes, unable take deep breath report having shallow breathing, which he took his inhaler.  Pt has asthma, and seasonal allergies reports has been taking Zyrtec daily and his inhaler.  Pt denies pain currently but heading out of town wanting to make sure it is not cardiac related.     Triage Assessment (Adult)       Row Name 05/16/25 0936          Triage Assessment    Airway WDL WDL

## 2025-05-16 NOTE — ED PROVIDER NOTES
"  Emergency Department Note      History of Present Illness     Chief Complaint   Chest Pain      HPI   Yakov Cantrell is a 34 year old male with a history of asthma and GERD presenting with wife for evaluation of chest pain. The patient had an onset of chest pain at 0230 this morning that woke him up from his sleep. The patient used his albuterol inhaler. He notes having associated shortness of breath that has subsided after taking albuterol. The patient notes that the chest pain lasted for 5-10 minutes in duration. He endorses blurry vision in the morning when he woke up that has subsided. There is no nausea or vomiting and the patient notes that he felt normal yesterday. He adds that he has a chronic cough from taking lisinopril. The patient is not on any immunosuppression medications. No tobacco use. No family hx of MI or sudden death before age 55. No hx of blood clots, no hemoptysis.     Independent Historian   Wife and patient as detailed above.    Review of External Notes   I reviewed an office visit from 03/31/25 for a general office visit.    Past Medical History     Medical History and Problem List   Cannabis dependence, episodic use (H)  CARDIOVASCULAR SCREENING; LDL GOAL LESS THAN 160  Cirrhosis (H)  Mild persistent asthma, unspecified whether complicated    Medications   Albuterol  citalopram (CELEXA) 20 MG tablet  diclofenac (VOLTAREN) 75 MG EC tablet  hydrochlorothiazide (HYDRODIURIL) 25 MG tablet  hydrOXYzine HCl (ATARAX) 25 MG tablet  lisinopril (ZESTRIL) 20 MG tablet  methylPREDNISolone (MEDROL DOSEPAK) 4 MG tablet therapy pack  omeprazole (PRILOSEC) 40 MG DR capsule    Physical Exam     Patient Vitals for the past 24 hrs:   BP Temp Temp src Pulse Resp SpO2 Height Weight   05/16/25 1148 (!) 159/93 -- -- 85 -- 99 % -- --   05/16/25 0936 127/85 98.7  F (37.1  C) Temporal 85 18 98 % 1.905 m (6' 3\") (!) 144.6 kg (318 lb 12.6 oz)     Physical Exam  General: Overall stable and nontoxic appearing  HEENT: " Conjunctivae clear, no scleral icterus, mucous membranes moist  Neuro: Alert, moving all extremities equally with intention  CV: Regular rate and rhythm, radial and DP pulses equal  Respiratory: No signs of respiratory distress, lungs clear to auscultation bilaterally   Abdomen: Soft, without rigidity or rebound throughout  MSK: No lower extremity pitting edema or swelling   Skin: Rashes c/w psoriasis over the knuckles, anterior shin, lateral arms     Diagnostics     Lab Results   Labs Ordered and Resulted from Time of ED Arrival to Time of ED Departure   BASIC METABOLIC PANEL - Abnormal       Result Value    Sodium 135      Potassium 3.9      Chloride 99      Carbon Dioxide (CO2) 24      Anion Gap 12      Urea Nitrogen 11.1      Creatinine 0.61 (*)     GFR Estimate >90      Calcium 9.1      Glucose 124 (*)    TROPONIN T, HIGH SENSITIVITY - Normal    Troponin T, High Sensitivity <6     LIPASE - Normal    Lipase 36     CBC WITH PLATELETS AND DIFFERENTIAL    WBC Count 9.0      RBC Count 5.09      Hemoglobin 14.1      Hematocrit 42.2      MCV 83      MCH 27.7      MCHC 33.4      RDW 12.8      Platelet Count 339      % Neutrophils 60      % Lymphocytes 23      % Monocytes 9      % Eosinophils 6      % Basophils 1      % Immature Granulocytes 1      NRBCs per 100 WBC 0      Absolute Neutrophils 5.4      Absolute Lymphocytes 2.1      Absolute Monocytes 0.9      Absolute Eosinophils 0.5      Absolute Basophils 0.1      Absolute Immature Granulocytes 0.1      Absolute NRBCs 0.0         Imaging   Chest XR,  PA & LAT   Final Result   IMPRESSION: No focal consolidation, pleural effusion or pneumothorax. Cardiomediastinal silhouette is unremarkable.          EKG   EKG 0 919  Sinus rhythm without any evidence of acute ischemia  Rate 67  QRS 78 QTc 437    Independent Interpretation   See ED course.    ED Course      Medications Administered   Medications   alum & mag hydroxide-simethicone (MAALOX) suspension 15 mL (15 mLs  Oral $Given 5/16/25 1025)       Procedures   Procedures     Discussion of Management   None    ED Course   ED Course as of 05/16/25 1241   Fri May 16, 2025   1015 I have obtained history and performed physical exam.   1159 I rechecked the patient.   1209 CXR on my independent interpretation no evidence of pneumothorax, infiltrate, pleural effusion        Additional Documentation  None    Medical Decision Making / Diagnosis     CMS Diagnoses: None    MIPS   None               MDM   Yakov Cantrell is a 34 year old male with hx of asthma and GERD who presents with episode of chest pain. On exam, patient is overall stable and nontoxic appearing. Vitals are reassuring.  No respiratory distress lungs are clear, pulses are equal bilaterally.    Considered ACS, PE, pneumothorax, aortic dissection, pneumonia, HF. EKG is nonischemic, symptoms are atypical, troponin is entirely undetectable. Patient is low risk for PE and PERC negative.  Chest x-ray without evidence of pneumothorax or widened mediastinum, with stability overall and duration of symptoms, low suspicion for aortic dissection.  No focal infiltrate, cough, fever to suggest pneumonia. No clinical evidence of heart failure.     Findings and workup discussed. Pt CP free in ED. Counseled close follow-up with primary care provider within the week. I discussed specific strict return precautions  and patient verbalized understanding, comfortable with plan for discharge.         Disposition   The patient was discharged.     Diagnosis     ICD-10-CM    1. Atypical chest pain  R07.89            Discharge Medications   Discharge Medication List as of 5/16/2025 12:12 PM            Scribe Disclosure:  Francheska LACKEY, am serving as a scribe at 10:22 AM on 5/16/2025 to document services personally performed by Kary Townsend MD based on my observations and the provider's statements to me.        Kary Townsend MD  05/18/25 9511

## 2025-07-10 ENCOUNTER — OFFICE VISIT (OUTPATIENT)
Dept: DERMATOLOGY | Facility: CLINIC | Age: 35
End: 2025-07-10
Payer: COMMERCIAL

## 2025-07-10 DIAGNOSIS — L40.9 PSORIASIS: ICD-10-CM

## 2025-07-10 LAB
ALBUMIN SERPL BCG-MCNC: 4.6 G/DL (ref 3.5–5.2)
ALP SERPL-CCNC: 43 U/L (ref 40–150)
ALT SERPL W P-5'-P-CCNC: 33 U/L (ref 0–70)
ANION GAP SERPL CALCULATED.3IONS-SCNC: 14 MMOL/L (ref 7–15)
AST SERPL W P-5'-P-CCNC: 30 U/L (ref 0–45)
BASOPHILS # BLD AUTO: 0.1 10E3/UL (ref 0–0.2)
BASOPHILS NFR BLD AUTO: 1 %
BILIRUB SERPL-MCNC: 0.2 MG/DL
BUN SERPL-MCNC: 11.8 MG/DL (ref 6–20)
CALCIUM SERPL-MCNC: 9.8 MG/DL (ref 8.8–10.4)
CHLORIDE SERPL-SCNC: 99 MMOL/L (ref 98–107)
CHOLEST SERPL-MCNC: 235 MG/DL
CREAT SERPL-MCNC: 0.75 MG/DL (ref 0.67–1.17)
EGFRCR SERPLBLD CKD-EPI 2021: >90 ML/MIN/1.73M2
EOSINOPHIL # BLD AUTO: 0.6 10E3/UL (ref 0–0.7)
EOSINOPHIL NFR BLD AUTO: 6 %
ERYTHROCYTE [DISTWIDTH] IN BLOOD BY AUTOMATED COUNT: 12.5 % (ref 10–15)
EST. AVERAGE GLUCOSE BLD GHB EST-MCNC: 128 MG/DL
FASTING STATUS PATIENT QL REPORTED: NO
FASTING STATUS PATIENT QL REPORTED: NO
GLUCOSE SERPL-MCNC: 109 MG/DL (ref 70–99)
HBA1C MFR BLD: 6.1 % (ref 0–5.6)
HBV CORE AB SERPL QL IA: NONREACTIVE
HBV SURFACE AB SERPL IA-ACNC: <3.5 M[IU]/ML
HBV SURFACE AB SERPL IA-ACNC: NONREACTIVE M[IU]/ML
HCO3 SERPL-SCNC: 25 MMOL/L (ref 22–29)
HCT VFR BLD AUTO: 43.7 % (ref 40–53)
HCV AB SERPL QL IA: NONREACTIVE
HDLC SERPL-MCNC: 42 MG/DL
HGB BLD-MCNC: 14.5 G/DL (ref 13.3–17.7)
IMM GRANULOCYTES # BLD: 0.2 10E3/UL
IMM GRANULOCYTES NFR BLD: 2 %
LDLC SERPL CALC-MCNC: 127 MG/DL
LYMPHOCYTES # BLD AUTO: 1.7 10E3/UL (ref 0.8–5.3)
LYMPHOCYTES NFR BLD AUTO: 19 %
MCH RBC QN AUTO: 27.7 PG (ref 26.5–33)
MCHC RBC AUTO-ENTMCNC: 33.2 G/DL (ref 31.5–36.5)
MCV RBC AUTO: 84 FL (ref 78–100)
MONOCYTES # BLD AUTO: 0.8 10E3/UL (ref 0–1.3)
MONOCYTES NFR BLD AUTO: 9 %
NEUTROPHILS # BLD AUTO: 5.9 10E3/UL (ref 1.6–8.3)
NEUTROPHILS NFR BLD AUTO: 63 %
NONHDLC SERPL-MCNC: 193 MG/DL
PLATELET # BLD AUTO: 370 10E3/UL (ref 150–450)
POTASSIUM SERPL-SCNC: 4 MMOL/L (ref 3.4–5.3)
PROT SERPL-MCNC: 7.9 G/DL (ref 6.4–8.3)
RBC # BLD AUTO: 5.23 10E6/UL (ref 4.4–5.9)
SODIUM SERPL-SCNC: 138 MMOL/L (ref 135–145)
TRIGL SERPL-MCNC: 330 MG/DL
WBC # BLD AUTO: 9.4 10E3/UL (ref 4–11)

## 2025-07-10 RX ORDER — HYDROCORTISONE 25 MG/G
CREAM TOPICAL 2 TIMES DAILY
Qty: 30 G | Refills: 3 | Status: SHIPPED | OUTPATIENT
Start: 2025-07-10

## 2025-07-10 RX ORDER — CLOBETASOL PROPIONATE 0.5 MG/G
OINTMENT TOPICAL 2 TIMES DAILY
Qty: 60 G | Refills: 5 | Status: SHIPPED | OUTPATIENT
Start: 2025-07-10

## 2025-07-10 NOTE — LETTER
7/10/2025      Yakov Cantrell  [address]  [facility] [address]      Dear Colleague,    Thank you for referring your patient, Yakov Cantrell, to the [facility]. Please see a copy of my visit note below.    [facility] Dermatology Note  Encounter Date: Jul 10, 2025  Office Visit     Reviewed patients past medical history and pertinent chart review prior to patients visit today.     Dermatology Problem List:  1. Psoriasis   -Taltz, clobetasol 0.05% ointment, and hydrocortisone 2.5% cream prescribed 7/10/2025   - Previous: Skyrizi (discontinued due to side effects of muscle cramps), other topical steroids    ____________________________________________    CC: Derm Problem (Psoriasis: Scalp, arms, butt crack, belly button, legs,  ears, feet. Possible spot on nose? Dents on finger nails. Has history of finger nail being lifted due to psoriasis around finger nails. )    HPI:  Mr. Yakov Cantrell is a(n) 35 year old male who presents today as a new patient for psoriasis.  The patient notes a history of psoriasis for the last 10 years.  He has tried several topical steroids in the past.  The patient was prescribed Skyrizi 3 to 4 years ago which cleared his psoriasis within 2 to 3 weeks.  However, he was having severe leg cramps and stopped the medication about 6 months ago.  The leg cramps have improved and resolved.  The patient psoriasis affects the umbilicus, ears, legs, buttocks, arms, and scalp.  Recently, he has noticed some pits involving the fingernails.    Patient is otherwise feeling well, without additional skin concerns.    Medications:  Current Outpatient Medications   Medication Sig Dispense Refill     citalopram (CELEXA) 20 MG tablet Take 1 tablet (20 mg) by mouth daily. 90 tablet 3     clobetasol (TEMOVATE) 0.05 % external ointment Apply topically 2 times daily. Apply to affected areas on arms, legs, trunk, and scalp for 2-3 weeks, then as needed for  flares. 60 g 5     hydrochlorothiazide (HYDRODIURIL) 25 MG tablet Take 1 tablet (25 mg) by mouth daily. 90 tablet 0     hydrocortisone 2.5 % cream Apply topically 2 times daily. Apply to affected areas on face for 1-2 weeks, then as needed for flares 30 g 3     ixekizumab (TALTZ) 80 MG/ML SOAJ auto-injector Inject 160 mg (2 mL) once, then start 80 mg (1 mL) at weeks 2, 4, 6, 8, 10, and 12 2 mL 3     ixekizumab (TALTZ) 80 MG/ML SOAJ auto-injector Inject 1 mL (80 mg) subcutaneously every 28 days. 2 mL 3     lisinopril (ZESTRIL) 20 MG tablet Take 1 tablet (20 mg) by mouth daily. 90 tablet 3     omeprazole (PRILOSEC) 40 MG DR capsule Take 1 capsule (40 mg) by mouth daily 90 capsule 0     albuterol (PROAIR HFA/PROVENTIL HFA/VENTOLIN HFA) 108 (90 Base) MCG/ACT inhaler Inhale 2 puffs into the lungs every 6 hours as needed for shortness of breath, wheezing or cough. (Patient not taking: Reported on 7/10/2025) 18 g 1     albuterol (PROVENTIL) (2.5 MG/3ML) 0.083% neb solution Take 1 vial (2.5 mg) by nebulization every 6 hours as needed for shortness of breath or wheezing (Patient not taking: Reported on 7/10/2025)       clobetasol propionate (TEMOVATE) 0.05 % external cream Apply topically 2 times daily (Patient not taking: Reported on 7/10/2025) 45 g 0     diclofenac (VOLTAREN) 75 MG EC tablet Take 1 tablet (75 mg) by mouth 2 times daily as needed for moderate pain. (Patient not taking: Reported on 7/10/2025) 60 tablet 0     hydrOXYzine HCl (ATARAX) 25 MG tablet Take 1 tablet (25 mg) by mouth 3 times daily as needed for anxiety. (Patient not taking: Reported on 7/10/2025) 270 tablet 0     methylPREDNISolone (MEDROL DOSEPAK) 4 MG tablet therapy pack Follow Package Directions (Patient not taking: Reported on 7/10/2025) 21 tablet 0     No current facility-administered medications for this visit.      Past Medical History:   Patient Active Problem List   Diagnosis     CARDIOVASCULAR SCREENING; LDL GOAL LESS THAN 160     Herpes  zoster without complication     Mild persistent asthma, unspecified whether complicated     Allergic rhinitis     Morbid obesity (H)     Sebaceous cyst     Atypical chest pain     Anxiety     Left elbow pain     Medial epicondylitis of left elbow     Lateral epicondylitis of left elbow     Tendinitis of left triceps     Ulnar neuritis, left     Past Medical History:   Diagnosis Date     Cannabis dependence, episodic use (H) 09/06/2022     CARDIOVASCULAR SCREENING; LDL GOAL LESS THAN 160 06/19/2014     Cirrhosis (H)     Skin, Hands     Mild persistent asthma, unspecified whether complicated 06/19/2018       ____________________________________________     Physical Exam:  Vitals: There were no vitals taken for this visit.   SKIN: The exam included face, scalp, trunk, bilateral upper extremities, and bilateral lower extremities.  - Dailey II.  - Bilateral shins, calves, and upper extremities, large pink, dry thickened plaques with overlying silver scaling  - Gluteal cleft, left back, umbilicus, nasal alar grooves, and ears, pink, dry, scaly papules  - Scalp, pink, dry, scaly plaque  - Fingernails, pitting present    - No other lesions of concern on areas examined.                       _________________________________________    Assessment & Plan:   # Psoriasis  Treatment options were discussed with the patient including narrowband UVB phototherapy, Otezla, or trying another biologic medication.    Taltz was agreed upon at this time.  Immunosuppression discussed, including increased risk of infection/malignancy.  Prior to starting the medication, CBC, CMP, hepatitis B, hepatitis C, and tuberculosis screening tests ordered.    I have prescribed clobetasol 0.05% ointment for the patient to apply to the affected areas on his trunk, extremities, and scalp twice daily for 2 to 3 weeks at a time, then as needed for flares.  I also prescribed hydrocortisone 2.5% cream for the patient to apply to the affected areas on  his face twice daily for 2 to 3 weeks, then as needed for flares.    Follow-up: 3 months, sooner if needed    All risks, benefits and alternatives were discussed with patient.  Patient is in agreement and understands the assessment and plan.  All questions were answered.    Kaitlin Coles PA-C  [facility]     Dorian Martínez [place]  [address] [place] [address] on close of this encounter.    Again, thank you for allowing me to participate in the care of your patient.        Sincerely,        Kaitlin Coles PA-C    Electronically signed

## 2025-07-11 NOTE — PROGRESS NOTES
[facility] Dermatology Note  Encounter Date: Jul 10, 2025  Office Visit     Reviewed patients past medical history and pertinent chart review prior to patients visit today.     Dermatology Problem List:  1. Psoriasis   -Taltz, clobetasol 0.05% ointment, and hydrocortisone 2.5% cream prescribed 7/10/2025   - Previous: Skyrizi (discontinued due to side effects of muscle cramps), other topical steroids    ____________________________________________    CC: Derm Problem (Psoriasis: Scalp, arms, butt crack, belly button, legs,  ears, feet. Possible spot on nose? Dents on finger nails. Has history of finger nail being lifted due to psoriasis around finger nails. )    HPI:  Mr. Yakov Cantrell is a(n) 35 year old male who presents today as a new patient for psoriasis.  The patient notes a history of psoriasis for the last 10 years.  He has tried several topical steroids in the past.  The patient was prescribed Skyrizi 3 to 4 years ago which cleared his psoriasis within 2 to 3 weeks.  However, he was having severe leg cramps and stopped the medication about 6 months ago.  The leg cramps have improved and resolved.  The patient psoriasis affects the umbilicus, ears, legs, buttocks, arms, and scalp.  Recently, he has noticed some pits involving the fingernails.    Patient is otherwise feeling well, without additional skin concerns.    Medications:  Current Outpatient Medications   Medication Sig Dispense Refill    citalopram (CELEXA) 20 MG tablet Take 1 tablet (20 mg) by mouth daily. 90 tablet 3    clobetasol (TEMOVATE) 0.05 % external ointment Apply topically 2 times daily. Apply to affected areas on arms, legs, trunk, and scalp for 2-3 weeks, then as needed for flares. 60 g 5    hydrochlorothiazide (HYDRODIURIL) 25 MG tablet Take 1 tablet (25 mg) by mouth daily. 90 tablet 0    hydrocortisone 2.5 % cream Apply topically 2 times daily. Apply to affected areas on face for 1-2 weeks, then as needed for flares  30 g 3    ixekizumab (TALTZ) 80 MG/ML SOAJ auto-injector Inject 160 mg (2 mL) once, then start 80 mg (1 mL) at weeks 2, 4, 6, 8, 10, and 12 2 mL 3    ixekizumab (TALTZ) 80 MG/ML SOAJ auto-injector Inject 1 mL (80 mg) subcutaneously every 28 days. 2 mL 3    lisinopril (ZESTRIL) 20 MG tablet Take 1 tablet (20 mg) by mouth daily. 90 tablet 3    omeprazole (PRILOSEC) 40 MG DR capsule Take 1 capsule (40 mg) by mouth daily 90 capsule 0    albuterol (PROAIR HFA/PROVENTIL HFA/VENTOLIN HFA) 108 (90 Base) MCG/ACT inhaler Inhale 2 puffs into the lungs every 6 hours as needed for shortness of breath, wheezing or cough. (Patient not taking: Reported on 7/10/2025) 18 g 1    albuterol (PROVENTIL) (2.5 MG/3ML) 0.083% neb solution Take 1 vial (2.5 mg) by nebulization every 6 hours as needed for shortness of breath or wheezing (Patient not taking: Reported on 7/10/2025)      clobetasol propionate (TEMOVATE) 0.05 % external cream Apply topically 2 times daily (Patient not taking: Reported on 7/10/2025) 45 g 0    diclofenac (VOLTAREN) 75 MG EC tablet Take 1 tablet (75 mg) by mouth 2 times daily as needed for moderate pain. (Patient not taking: Reported on 7/10/2025) 60 tablet 0    hydrOXYzine HCl (ATARAX) 25 MG tablet Take 1 tablet (25 mg) by mouth 3 times daily as needed for anxiety. (Patient not taking: Reported on 7/10/2025) 270 tablet 0    methylPREDNISolone (MEDROL DOSEPAK) 4 MG tablet therapy pack Follow Package Directions (Patient not taking: Reported on 7/10/2025) 21 tablet 0     No current facility-administered medications for this visit.      Past Medical History:   Patient Active Problem List   Diagnosis    CARDIOVASCULAR SCREENING; LDL GOAL LESS THAN 160    Herpes zoster without complication    Mild persistent asthma, unspecified whether complicated    Allergic rhinitis    Morbid obesity (H)    Sebaceous cyst    Atypical chest pain    Anxiety    Left elbow pain    Medial epicondylitis of left elbow    Lateral epicondylitis  of left elbow    Tendinitis of left triceps    Ulnar neuritis, left     Past Medical History:   Diagnosis Date    Cannabis dependence, episodic use (H) 09/06/2022    CARDIOVASCULAR SCREENING; LDL GOAL LESS THAN 160 06/19/2014    Cirrhosis (H)     Skin, Hands    Mild persistent asthma, unspecified whether complicated 06/19/2018       ____________________________________________     Physical Exam:  Vitals: There were no vitals taken for this visit.   SKIN: The exam included face, scalp, trunk, bilateral upper extremities, and bilateral lower extremities.  - Dailey II.  - Bilateral shins, calves, and upper extremities, large pink, dry thickened plaques with overlying silver scaling  - Gluteal cleft, left back, umbilicus, nasal alar grooves, and ears, pink, dry, scaly papules  - Scalp, pink, dry, scaly plaque  - Fingernails, pitting present    - No other lesions of concern on areas examined.                       _________________________________________    Assessment & Plan:   # Psoriasis  Treatment options were discussed with the patient including narrowband UVB phototherapy, Otezla, or trying another biologic medication.    Taltz was agreed upon at this time.  Immunosuppression discussed, including increased risk of infection/malignancy.  Prior to starting the medication, CBC, CMP, hepatitis B, hepatitis C, and tuberculosis screening tests ordered.    I have prescribed clobetasol 0.05% ointment for the patient to apply to the affected areas on his trunk, extremities, and scalp twice daily for 2 to 3 weeks at a time, then as needed for flares.  I also prescribed hydrocortisone 2.5% cream for the patient to apply to the affected areas on his face twice daily for 2 to 3 weeks, then as needed for flares.    Follow-up: 3 months, sooner if needed    All risks, benefits and alternatives were discussed with patient.  Patient is in agreement and understands the assessment and plan.  All questions were  answered.    JEANETTE Miranda [facility]    CC Dorian Martínez,   [address] [place] [address] on close of this encounter.

## 2025-07-12 LAB
QUANTIFERON MITOGEN: 10 IU/ML
QUANTIFERON NIL TUBE: 0.04 IU/ML
QUANTIFERON TB1 TUBE: 0.05 IU/ML
QUANTIFERON TB2 TUBE: 0.05

## 2025-07-13 LAB
GAMMA INTERFERON BACKGROUND BLD IA-ACNC: 0.04 IU/ML
M TB IFN-G BLD-IMP: NEGATIVE
M TB IFN-G CD4+ BCKGRND COR BLD-ACNC: 9.96 IU/ML
MITOGEN IGNF BCKGRD COR BLD-ACNC: 0.01 IU/ML
MITOGEN IGNF BCKGRD COR BLD-ACNC: 0.01 IU/ML

## 2025-07-14 ENCOUNTER — MYC REFILL (OUTPATIENT)
Dept: NEPHROLOGY | Facility: CLINIC | Age: 35
End: 2025-07-14
Payer: COMMERCIAL

## 2025-07-14 DIAGNOSIS — I10 PRIMARY HYPERTENSION: ICD-10-CM

## 2025-07-14 RX ORDER — LISINOPRIL 20 MG/1
20 TABLET ORAL DAILY
Qty: 90 TABLET | Refills: 3 | Status: SHIPPED | OUTPATIENT
Start: 2025-07-14

## 2025-07-15 ENCOUNTER — TELEPHONE (OUTPATIENT)
Dept: DERMATOLOGY | Facility: CLINIC | Age: 35
End: 2025-07-15
Payer: COMMERCIAL

## 2025-07-15 ENCOUNTER — VIRTUAL VISIT (OUTPATIENT)
Dept: PHARMACY | Facility: CLINIC | Age: 35
End: 2025-07-15
Attending: PHYSICIAN ASSISTANT
Payer: COMMERCIAL

## 2025-07-15 DIAGNOSIS — F41.9 ANXIETY: ICD-10-CM

## 2025-07-15 DIAGNOSIS — L40.9 PSORIASIS: Primary | ICD-10-CM

## 2025-07-15 DIAGNOSIS — I10 PRIMARY HYPERTENSION: ICD-10-CM

## 2025-07-15 DIAGNOSIS — J45.30 MILD PERSISTENT ASTHMA, UNSPECIFIED WHETHER COMPLICATED: ICD-10-CM

## 2025-07-15 DIAGNOSIS — K21.00 GASTROESOPHAGEAL REFLUX DISEASE WITH ESOPHAGITIS: ICD-10-CM

## 2025-07-15 NOTE — TELEPHONE ENCOUNTER
PA Initiation    Medication: SKYRIZI  MG/ML SC SOAJ  Insurance Company: [facility] Prior Auth Dept, phone  [phone], Fax [phone]  Pharmacy Filling the Rx: [facility] - [place], [place] - [facility] [address]  Filling Pharmacy Phone: [phone]  Filling Pharmacy Fax: [phone]  Start Date: 7/15/2025    Key: [id number]

## 2025-07-15 NOTE — PROGRESS NOTES
Medication Therapy Management (MTM) Encounter    ASSESSMENT:                            Psoriasis:   Flaring with significant BSA involvement. Previously well controlled on Skyrizi but stopped due to concerns with muscle cramps. Reviewed this is not a commonly reported side effects with Skyrizi, but that it may have been related or coincidental. Patient expressed that he would like to retry Skyrizi given efficacy in past & since this preferred by insurance vs trial of Taltz. Received verbal approval from Dr. Castro (Dermatologist) to change treatment. Placed orders today.  We reviewed Skyrizi dosing, administration, side effects, precautions, and time to efficacy.     Pre-biologic labs: completed; viral screening negative   Vaccines: Avoid live vaccines. Indicated for the following non-live vaccines: Covid-19 vaccine (2024-25), Hepatitis B vaccine series (negative serologies), Shingrix (2 dose series; indicated to receive with immunosuppressive therapy). Patient agrees to discuss this with primary.     Hypertension   Patient was not meeting blood pressure goal of <140/90 mmHg at last office. Would benefit from repeat check at next office visit. Patient will also discuss concerns with possible postural hypotension with primary.     Anxiety:   Stable.     Asthma:  Increased need for albuterol rescue inhaler with allergens & air quality triggers. Discsuse he may benefit from maintenance inhaler during season flare periods to reduce risk for acute asthma attack.He is planning to address with primary.     GERD:   Controlled on omeprazole. Discussed patients concerns with long-term risks associated with chronic proton pump inhibitors. Encouraged patient to discuss with primary option to taper off omeprazole and instead take H2 blocker (I.e., famotidine) as needed for symptoms.     PLAN:                            Confirmed plan to start Skyrizi with Dermatologist (Dr. Kurtz). Placed orders today. Will work on coverage  & update patient as soon as this is approved     Pending insurance approval: Start Skyrizi (risankizumab) as directed   Dose: (plaque psoriasis) Inject 150 mg subcutaneous at week 0 and week 4, then every 12 weeks thereafter.     Vaccine considerations:   Covid-19 vaccine (2024-25)  Hepatitis B vaccine series (negative serologies)  Shingrix (2 dose series; indicated to receive with immunosuppressive therapy)    Follow-up: with me (Korin Silver [facility]) for an MTM follow up appointment by phone in around 4 months (11/16/25)     SUBJECTIVE/OBJECTIVE:                          Yakov Cantrell is a 35 year old male called for an initial visit. He was referred to me from Kaitlin Coles PA-C.      Reason for visit: discuss Taltz initiation.    Medication Adherence/Access:   Taltz coverage:   - PA denied. Needs to try preferred agents by insurance:   - Humira biosimilar (Hyrimoz)   - Bimzelx (bimekizumab)   - Otezla (apremilast)   - Skyrizi (risankizumab) - previously tried; cleared pso ; pt stopped d/t muscle cramps  - Sotyktu (deucravacitinib)   -  Stelara (ustekinumab)   - Tremfya (guselkumab)   - Stelara or biosimilar (ustekinumab)     Psoriasis:   - Clobetasol 0.05% ointment twice daily as needed (to body)   - Hydrocortisone 2.5% cream twice daily as needed (to face)   No side effects reported.     Patient reports psoriasis affects legs, buttocks, arms, and scalp, ears, navel, feet.  Has noticed some pits involving the fingernails. Share he was prescribed Skyrizi 3 to 4 years ago which entirely cleared his psoriasis within the first few weeks of treatment but stopped due to concerns with leg cramps. ; unsure if this was coincidental or related but would be interested in trying Skyrizi again vs Taltz since it's preferred by his insurance & positive response in past.     Specialist: Kaitlin Coles PA-C, Dermatology. Last visit on 7/10/25.    Previous treatment:   - Skyrizi (risankizumab) 150 mg every 12 weeks : highly  effective; stopped due to concerns with muscle cramps in legs   - Various topical steroids: limited response     Pre-Biologic Screening: completed 7/10/25   Hep B Surface Antibody Non-reactive - consider vaccines with PCP   Hep B Core Antibody  Non-reactive    Hep B Surface Antigen Non-reactive   Hep C Antibody  Non-reactive    Quantiferon TB Gold Negative  Last checked: 7/10/25     Immunization History   Covid-19 vaccine (5073-1340 version)  Due to receive   Influenza (annual) Up-to-date, avoid live FluMist   Pneumococcal  Prevnar-20: 3/1/23 Up-to-date   Tetanus/Tdap  Up-to-date   Shingrix Eligible to receive with immunomodulator start   All patients on biologics should avoid live vaccines (varicella/VZV, intranasal influenza, MMR, or yellow fever vaccine (if traveling))       Hypertension   - hydrochlorothiazide 25 mg daily   - Lisinopril 20 mg daily   Side effects: some lightheadedness when laying day & sitting up - overall tolerable but will discuss with primary.     Patient does not self-monitor blood pressure.     Anxiety:   - Citalopram 20 mg daily   - Hydroxyzyine 25 mg three times daily as needed for acute anxiety   No current side effects or changes in symptoms.     Asthma:   - Albuterol HFA as needed     Share she has required rescue inhaler more often as of recent due to seasonal changes & air quality. He plans to talk with his primary about possibly adding on a steroid inahler for maintenance.     GERD:   - Omeprazole 40 mg daily   No current side effects.     Restarted after ED visit on 5/16/25 - wonders if he needs to continue this. Prefers not to take long-term due to concerns with associated safety risk (family history of Alzheimers & dementia).     Allergies/ADRs: Reviewed in chart  Past Medical History: Reviewed in chart  Tobacco: He reports that he has quit smoking. His smoking use included cigarettes and other. He has never used smokeless tobacco.  Alcohol: Social occasions only    ----------------    I spent 38 minutes with this patient today. All changes were made via collaborative practice agreement with Kaitlin Coles.     A summary of these recommendations was sent via WOWash.    Korin Silver, PharmD  Medication Therapy Management Pharmacist   [facility]     Telemedicine Visit Details  The patient's medications can be safely assessed via a telemedicine encounter.  Type of service:  Telephone visit  Originating Location (pt. Location): Home    Distant Location (provider location):  Off-site  Start Time: 11:00 AM  End Time: 11:38 AM     Medication Therapy Recommendations  Psoriasis   1 Current Medication: ixekizumab (TALTZ) 80 MG/ML SOAJ auto-injector (Discontinued)   Current Medication Sig: Inject 160 mg (2 mL) once, then start 80 mg (1 mL) at weeks 2, 4, 6, 8, 10, and 12   Rationale: More cost-effective medication available - Cost - Adherence   Recommendation: Change Medication - Skyrizi Pen 150 MG/ML Soaj   Status: Accepted per Provider   Identified Date: 7/15/2025 Completed Date: 7/15/2025

## 2025-07-16 NOTE — PATIENT INSTRUCTIONS
Recommendations from today's MTM visit:                                                      Confirmed plan to start Skyrizi with Dermatologist (Dr. Kurtz). Placed prescription orders today. Will work on coverage & update you as soon as this is approved     Pending insurance approval: Start Skyrizi (risankizumab) as directed   Dose: (plaque psoriasis) Inject 150 mg subcutaneous at week 0 and week 4, then every 12 weeks thereafter.     Injection Devices: Skyrizi is available in a prefilled syringe and auto-injector pen. We will try to get the auto-injector pen covered by your insurance first.     Injection site: Administer into front of thighs or abdomen (at least 2 inches from navel). Rotate injection site by at least 1 inch. Administer into healthy skin, avoiding lesions, plaques, etc.      Storage: Refrigerate until use. Allow medication to sit at room temperature for 30-90 minutes before your injection.      Demo Video: (click on the device you received) [email or web address]    Skyrizi Savings Card: [email or web address]    Vaccine considerations:   Avoid live vaccines. Live vaccines are not common, but I recommend you confirm with pharmacy or clinic whether a vaccine is non-live before receiving. None of the vaccines I listed below are live.   Covid-19 vaccine (2024-25)  Hepatitis B vaccine series (negative serologies)  Shingrix (2 dose series; indicated to receive with immunosuppressive therapy)    Here is the website to find a local safe needle drop off location: [email or web address]     Follow-up: with me (Korin Silver [facility]) for an [facility] follow up appointment by phone in around 4 months (11/16/25)     My Clinical Pharmacist's contact information:                                                      Please feel free to contact me with any questions or concerns you have.      Korin Silver, Bhavana  [facility] Pharmacist  [facility]  Dermatology

## 2025-07-17 NOTE — TELEPHONE ENCOUNTER
Prior Authorization Approval    Medication: SKYRIZI  MG/ML SC SOAJ  Authorization Effective Date: 7/16/2025  Authorization Expiration Date: 7/16/2026  Approved Dose/Quantity: 1ml per 28 days loading then 1ml every 84 days for maintenance  Reference #: Key: [id number]   Insurance Company: [facility] Prior Auth Dept, phone  [phone], Fax [phone]  Expected CoPay: $    CoPay Card Available: Yes    Financial Assistance Needed: yes  Which Pharmacy is filling the prescription: [facility]  Pharmacy Notified: yes  Patient Notified: yes

## 2025-08-18 ENCOUNTER — MYC MEDICAL ADVICE (OUTPATIENT)
Dept: FAMILY MEDICINE | Facility: CLINIC | Age: 35
End: 2025-08-18
Payer: COMMERCIAL